# Patient Record
Sex: FEMALE | Race: WHITE | NOT HISPANIC OR LATINO | Employment: OTHER | URBAN - METROPOLITAN AREA
[De-identification: names, ages, dates, MRNs, and addresses within clinical notes are randomized per-mention and may not be internally consistent; named-entity substitution may affect disease eponyms.]

---

## 2017-01-10 RX ORDER — LEVOTHYROXINE SODIUM 0.1 MG/1
TABLET ORAL
Qty: 90 TABLET | Refills: 0 | Status: SHIPPED | OUTPATIENT
Start: 2017-01-10 | End: 2017-01-20 | Stop reason: SDUPTHER

## 2017-01-20 RX ORDER — LEVOTHYROXINE SODIUM 0.1 MG/1
TABLET ORAL
Qty: 90 TABLET | Refills: 0 | Status: SHIPPED | OUTPATIENT
Start: 2017-01-20 | End: 2017-07-24 | Stop reason: SDUPTHER

## 2017-01-20 RX ORDER — VALSARTAN AND HYDROCHLOROTHIAZIDE 160; 12.5 MG/1; MG/1
TABLET, FILM COATED ORAL
Qty: 90 TABLET | Refills: 1 | Status: SHIPPED | OUTPATIENT
Start: 2017-01-20 | End: 2017-07-24 | Stop reason: SDUPTHER

## 2017-03-09 ENCOUNTER — RESULTS ENCOUNTER (OUTPATIENT)
Dept: INTERNAL MEDICINE | Facility: CLINIC | Age: 82
End: 2017-03-09

## 2017-03-09 ENCOUNTER — OFFICE VISIT (OUTPATIENT)
Dept: INTERNAL MEDICINE | Facility: CLINIC | Age: 82
End: 2017-03-09

## 2017-03-09 VITALS
TEMPERATURE: 97.9 F | OXYGEN SATURATION: 97 % | DIASTOLIC BLOOD PRESSURE: 68 MMHG | BODY MASS INDEX: 29.59 KG/M2 | HEIGHT: 64 IN | WEIGHT: 173.3 LBS | HEART RATE: 80 BPM | SYSTOLIC BLOOD PRESSURE: 142 MMHG

## 2017-03-09 DIAGNOSIS — I10 ESSENTIAL HYPERTENSION: Primary | ICD-10-CM

## 2017-03-09 DIAGNOSIS — E11.9 TYPE 2 DIABETES MELLITUS WITHOUT COMPLICATION, WITHOUT LONG-TERM CURRENT USE OF INSULIN (HCC): ICD-10-CM

## 2017-03-09 DIAGNOSIS — E03.9 ACQUIRED HYPOTHYROIDISM: ICD-10-CM

## 2017-03-09 DIAGNOSIS — E78.2 MIXED HYPERLIPIDEMIA: ICD-10-CM

## 2017-03-09 PROCEDURE — 99214 OFFICE O/P EST MOD 30 MIN: CPT | Performed by: FAMILY MEDICINE

## 2017-03-09 RX ORDER — ANASTROZOLE 1 MG/1
1 TABLET ORAL DAILY
COMMUNITY
End: 2017-07-06

## 2017-03-09 NOTE — PROGRESS NOTES
Subjective   Marcelle Johnson is a 84 y.o. female.     Chief Complaint   Patient presents with   • follow up to hypothyroidism   • follow up to hypertension   • follow up hyperlipidemia   • follow up to diabetes         History of Present Illness   Patient comes in new to this office for follow-up of chronic medical problems listed above she has no acute concerns her medications have been long-standing and stable she has no chest pain shortness of breath or increase fatigue her thyroid has blast and checked for TSH in August she has not had a recent A1c but does not want doing blood work because she says it hasn't been covered in the past through her insurance company she monitors her blood pressure occasionally but does not check her blood sugars.  He has been through dietary counseling she is followed by gastroenterology oncology  The following portions of the patient's history were reviewed and updated as appropriate: allergies, current medications, past family history, past medical history, past social history, past surgical history and problem list.    Review of Systems   Constitutional: Negative.    HENT: Negative.    Eyes: Negative.    Respiratory: Negative.    Cardiovascular: Negative.    Gastrointestinal: Negative.    Genitourinary: Negative.    Musculoskeletal: Negative.    Neurological: Negative.    Hematological: Negative.        Objective   Physical Exam   Constitutional: She is oriented to person, place, and time. She appears well-developed and well-nourished. No distress.   HENT:   Head: Normocephalic and atraumatic.   Eyes: Conjunctivae and EOM are normal. Pupils are equal, round, and reactive to light. Right eye exhibits no discharge. Left eye exhibits no discharge. No scleral icterus.   Neck: Normal range of motion. Neck supple. No tracheal deviation present. No thyromegaly present.   Cardiovascular: Normal rate, regular rhythm, normal heart sounds, intact distal pulses and normal pulses.  Exam  reveals no gallop.    No murmur heard.  Pulmonary/Chest: Effort normal and breath sounds normal. No respiratory distress. She has no wheezes. She has no rales.   Musculoskeletal: Normal range of motion.   Neurological: She is alert and oriented to person, place, and time. She exhibits normal muscle tone. Coordination normal.   Skin: Skin is warm. No rash noted. No erythema. No pallor.   Psychiatric: She has a normal mood and affect. Her behavior is normal. Judgment and thought content normal.   Nursing note and vitals reviewed.      Assessment/Plan   Marcelle was seen today for follow up to hypothyroidism, follow up to hypertension, follow up hyperlipidemia and follow up to diabetes.    Diagnoses and all orders for this visit:    Essential hypertension    Mixed hyperlipidemia  -     Lipid Panel; Future    Acquired hypothyroidism  -     TSH; Future    Type 2 diabetes mellitus without complication, without long-term current use of insulin  -     Microalbumin / Creatinine Urine Ratio; Future  -     Hemoglobin A1c; Future      Follow-up fasting blood work as ordered with microalbumin to determine need for medication and controlling her diabetes monitor blood pressure continue low-cholesterol diet with lipid therapy follow-up results of TSH  Otherwise wellness visit 6 months

## 2017-03-14 ENCOUNTER — RESULTS ENCOUNTER (OUTPATIENT)
Dept: INTERNAL MEDICINE | Facility: CLINIC | Age: 82
End: 2017-03-14

## 2017-03-14 DIAGNOSIS — E11.9 TYPE 2 DIABETES MELLITUS WITHOUT COMPLICATION, WITHOUT LONG-TERM CURRENT USE OF INSULIN (HCC): ICD-10-CM

## 2017-03-14 DIAGNOSIS — E78.2 MIXED HYPERLIPIDEMIA: ICD-10-CM

## 2017-03-14 DIAGNOSIS — E03.9 ACQUIRED HYPOTHYROIDISM: ICD-10-CM

## 2017-06-01 ENCOUNTER — OFFICE VISIT (OUTPATIENT)
Dept: ONCOLOGY | Facility: CLINIC | Age: 82
End: 2017-06-01

## 2017-06-01 ENCOUNTER — APPOINTMENT (OUTPATIENT)
Dept: ONCOLOGY | Facility: HOSPITAL | Age: 82
End: 2017-06-01

## 2017-06-01 ENCOUNTER — LAB (OUTPATIENT)
Dept: OTHER | Facility: HOSPITAL | Age: 82
End: 2017-06-01

## 2017-06-01 VITALS
BODY MASS INDEX: 30.48 KG/M2 | RESPIRATION RATE: 12 BRPM | OXYGEN SATURATION: 97 % | TEMPERATURE: 97.6 F | DIASTOLIC BLOOD PRESSURE: 76 MMHG | SYSTOLIC BLOOD PRESSURE: 164 MMHG | HEART RATE: 90 BPM | WEIGHT: 172 LBS | HEIGHT: 63 IN

## 2017-06-01 DIAGNOSIS — C50.411 BREAST CANCER OF UPPER-OUTER QUADRANT OF RIGHT FEMALE BREAST (HCC): Primary | ICD-10-CM

## 2017-06-01 DIAGNOSIS — R94.02 ABNORMAL BRAIN SCAN: Primary | ICD-10-CM

## 2017-06-01 DIAGNOSIS — M81.0 OSTEOPOROSIS: ICD-10-CM

## 2017-06-01 DIAGNOSIS — C50.919 MALIGNANT NEOPLASM OF FEMALE BREAST, UNSPECIFIED LATERALITY, UNSPECIFIED SITE OF BREAST: ICD-10-CM

## 2017-06-01 LAB
ALBUMIN SERPL-MCNC: 3.7 G/DL (ref 3.5–5.2)
ALBUMIN/GLOB SERPL: 1.3 G/DL
ALP SERPL-CCNC: 55 U/L (ref 39–117)
ALT SERPL W P-5'-P-CCNC: 17 U/L (ref 1–33)
ANION GAP SERPL CALCULATED.3IONS-SCNC: 14 MMOL/L
AST SERPL-CCNC: 20 U/L (ref 1–32)
BASOPHILS # BLD AUTO: 0.06 10*3/MM3 (ref 0–0.2)
BASOPHILS NFR BLD AUTO: 0.6 % (ref 0–1.5)
BILIRUB SERPL-MCNC: 0.4 MG/DL (ref 0.1–1.2)
BUN BLD-MCNC: 12 MG/DL (ref 8–23)
BUN/CREAT SERPL: 15.2 (ref 7–25)
CALCIUM SPEC-SCNC: 9 MG/DL (ref 8.6–10.5)
CHLORIDE SERPL-SCNC: 97 MMOL/L (ref 98–107)
CO2 SERPL-SCNC: 28 MMOL/L (ref 22–29)
CREAT BLD-MCNC: 0.79 MG/DL (ref 0.57–1)
DEPRECATED RDW RBC AUTO: 40.7 FL (ref 37–54)
EOSINOPHIL # BLD AUTO: 0.2 10*3/MM3 (ref 0–0.7)
EOSINOPHIL NFR BLD AUTO: 2 % (ref 0.3–6.2)
ERYTHROCYTE [DISTWIDTH] IN BLOOD BY AUTOMATED COUNT: 13.1 % (ref 11.7–13)
GFR SERPL CREATININE-BSD FRML MDRD: 69 ML/MIN/1.73
GLOBULIN UR ELPH-MCNC: 2.8 GM/DL
GLUCOSE BLD-MCNC: 141 MG/DL (ref 65–99)
HCT VFR BLD AUTO: 39.8 % (ref 35.6–45.5)
HGB BLD-MCNC: 13.4 G/DL (ref 11.9–15.5)
HOLD SPECIMEN: NORMAL
IMM GRANULOCYTES # BLD: 0.03 10*3/MM3 (ref 0–0.03)
IMM GRANULOCYTES NFR BLD: 0.3 % (ref 0–0.5)
LYMPHOCYTES # BLD AUTO: 3.99 10*3/MM3 (ref 0.9–4.8)
LYMPHOCYTES NFR BLD AUTO: 39.4 % (ref 19.6–45.3)
MAGNESIUM SERPL-MCNC: 2 MG/DL (ref 1.6–2.4)
MCH RBC QN AUTO: 28.9 PG (ref 26.9–32)
MCHC RBC AUTO-ENTMCNC: 33.7 G/DL (ref 32.4–36.3)
MCV RBC AUTO: 86 FL (ref 80.5–98.2)
MONOCYTES # BLD AUTO: 0.72 10*3/MM3 (ref 0.2–1.2)
MONOCYTES NFR BLD AUTO: 7.1 % (ref 5–12)
NEUTROPHILS # BLD AUTO: 5.13 10*3/MM3 (ref 1.9–8.1)
NEUTROPHILS NFR BLD AUTO: 50.6 % (ref 42.7–76)
NRBC BLD MANUAL-RTO: 0 /100 WBC (ref 0–0)
PHOSPHATE SERPL-MCNC: 3.7 MG/DL (ref 2.5–4.5)
PLATELET # BLD AUTO: 185 10*3/MM3 (ref 140–500)
PMV BLD AUTO: 10 FL (ref 6–12)
POTASSIUM BLD-SCNC: 4.1 MMOL/L (ref 3.5–5.2)
PROT SERPL-MCNC: 6.5 G/DL (ref 6–8.5)
RBC # BLD AUTO: 4.63 10*6/MM3 (ref 3.9–5.2)
SODIUM BLD-SCNC: 139 MMOL/L (ref 136–145)
WBC NRBC COR # BLD: 10.13 10*3/MM3 (ref 4.5–10.7)

## 2017-06-01 PROCEDURE — 85025 COMPLETE CBC W/AUTO DIFF WBC: CPT | Performed by: INTERNAL MEDICINE

## 2017-06-01 PROCEDURE — 83735 ASSAY OF MAGNESIUM: CPT | Performed by: INTERNAL MEDICINE

## 2017-06-01 PROCEDURE — 99214 OFFICE O/P EST MOD 30 MIN: CPT | Performed by: INTERNAL MEDICINE

## 2017-06-01 PROCEDURE — 36415 COLL VENOUS BLD VENIPUNCTURE: CPT

## 2017-06-01 PROCEDURE — 84100 ASSAY OF PHOSPHORUS: CPT | Performed by: INTERNAL MEDICINE

## 2017-06-01 PROCEDURE — 80053 COMPREHEN METABOLIC PANEL: CPT | Performed by: INTERNAL MEDICINE

## 2017-06-01 NOTE — PROGRESS NOTES
Subjective .     REASONS FOR FOLLOWUP: 1.  [id number] invasive ductal carcinoma of the right breast, estrogen receptor positive greater than 90%, progesterone receptor 90%, HER-2/breanne 2+ on immunohistochemistry and negative by fish  2.  Osteoporosis  3. arimidex held secondary to arthralgias.  ? Side effect to prolia,  HISTORY OF PRESENT ILLNESS:  The patient is a 84 y.o. year old female who is here for follow-up with the above-mentioned history.    History of Present Illness   patient is a 83-year-old female with a above  history currently here for follow-up.  She is here to start Arimidex for her T1 cN0 M0 invasive ductal carcinoma of the right breast.  She had a bone density testing done which does show osteoporosis. Pt received one dose of prolia and states she felt like her swallowing got worse and had gum pain and now has on and of gum bleeding. She is not wanting to take prolia. She has baseline difficulty swallowing and has required dilatation of esophagus. Does not want oral bisphosphonates. i m unsure if gum pain is secondary to arimidex.las mammogram 8/2016    Past Medical History:   Diagnosis Date   • Breast cancer of upper-outer quadrant of right female breast 10/13/2016   • Colon cancer     had colon resection   • Diabetes     borderline   • Diverticulosis    • Esophageal stricture     had it stretched via scope   • High cholesterol    • Hypertension    • Hypothyroid    • Pneumonia        ONCOLOGIC HISTORY:  (History from previous dates can be found in the separate document.)  patient is a 83-year-old female who states that she noticed a lump in the upper outer quadrant of the right breast over the last 2 months. She thinks it was the size of a dime. She then underwent a mammogram on 8/3/2016 which showed a 1 cm mass in the middle third upper outer quadrant of the right breast. Additionly  there was a small asymmetry in the left breast. Spot compression showed partial resolution of this area in the left  breast. This measures 0.8 cm. No evidence of axillary adenopathy seen.     Ultrasound was done which shows at 10 o'clock position in the right breast there is a 0.9 cm irregular heterogeneous mass. But no abnormality is seen in the left breast. A six-month follow-up on the left breast is suggested.     Patient then underwent ultrasound-guided biopsy on 8/17/2016. Allergies shows invasive mammary carcinoma ductal type with focal lobular features in the right breast, Nadira score of 5 out of 9. There is no angiolymphatic invasion seen. Maximum dimension of invasive carcinoma in 7 mm. No carcinoma in situ identified. Estrogen receptors were greater than 90%, progesterone receptor is 90%, HER-2/breanne was 2+ on immunohistochemistry and negative by fish.     MRI of the breast shows middle third upper quadrant of the right breast at 10 o'clock position the biopsy cavity measured 1.1 cm from superior to inferior dimension 1.3 cm in anterior to posterior dimension and 2.1 cm from medial to lateral dimension. There were no other areas of enhancement in the left breast.  Chest x-ray no acute infiltrate.     Patient underwent lumpectomy on the right side with sentinel lymph node biopsy by Dr. Lee on 9/19/2016. Patient had invasive carcinoma 1.2 cm with lobular features. The overall grade is grade 2 with her Nadira score of 6 out of 9. He had focal ductal carcinoma in situ and focal atypical lobular hyperplasia as well. The ductal carcinoma in situ was less than 1 mm, cribriform pattern and low nuclear grade grade 1. The margins were indeterminate for invasive carcinoma but uninvolved by DCIS. Patient has had reexcised margins and invasive carcinoma is present only in the reexcised anterior margin. The tumor focally extends to within 1 mm of the reexcised anterior margin. The fibroinflammatory biopsy skin changes extend to the anterior margin. I will need to discuss with the pathologist about the margins. She has  extends to within 3 mm was to margins and to within 5 mm in the inferior margin. The number of sentinel lymph nodes examined is to and there is no evidence of my or macro metastasis and no isolated tumor cells.     Patient now has been referred to radiation oncology and us for further options of treatment.  Given her age, her low stage, her strong estrogen and progesterone receptor positivity Dr. Nolasco felt that she does not need to undergo radiation.    Patient's bone density done shows osteoporosis.  Discussion done about consideration of tamoxifen versus Arimidex with prolia.  Patient refuses tamoxifen and is willing to consider Arimidex.  We did discuss that it'll be important for her to take prolia , calcium and vitamin D.  Pt could not tolerate prolia .    Pt on arimidex  and likely change to tamoxifen. Due to side effects.  Past medical history: Patient was diagnosed with the colon cancer back in  and at the same time she had her ovaries removed and appendix and gallbladder removed. She didn't require any chemotherapy at the time.  Patient had pneumonia back in .  Her hypertension  High cholesterol  Hypothyroidism  Patient has had colonoscopy 5 years ago by Dr. Tanner and apparently had a polyp she was to return to him for another colonoscopy in 5 years.  Patient had esophageal stricture as a result she underwent dilation by Dr. Hopson last year.  She underwent parathyroidectomy.  She has borderline diabetes. On diet control     OB/GYN history she started metastases. At age 12, had menopause at age 50 when she underwent oophorectomy. She is  2 para 2 no miscarriages she has 2 children one is 54 and second one is 51-year-old and one grandchild.       Current Outpatient Prescriptions on File Prior to Visit   Medication Sig Dispense Refill   • anastrozole (ARIMIDEX) 1 MG tablet Take 1 mg by mouth Daily.     • aspirin 81 MG tablet Take 81 mg by mouth daily.     • Calcium Citrate-Vitamin D  "(CALCIUM + D PO) Take 1,000 mg by mouth daily.     • denosumab (PROLIA) 60 MG/ML solution syringe Inject 60 mg under the skin 1 (One) Time.     • Esomeprazole Magnesium (NEXIUM PO) Take 22.3 mg by mouth every morning.     • FIBER PO Take 2 capsules by mouth daily.     • levothyroxine (SYNTHROID, LEVOTHROID) 100 MCG tablet TAKE 1 TABLET BY MOUTH 30 MINUTES BEFORE A MEAL ONCE DAILY. 90 tablet 0   • pravastatin (PRAVACHOL) 40 MG tablet Take 40 mg by mouth every night.     • sulfamethoxazole-trimethoprim (BACTRIM DS,SEPTRA DS) 800-160 MG per tablet Take 1 tablet by mouth 2 (Two) Times a Day. 10 tablet 0   • valsartan-hydrochlorothiazide (DIOVAN-HCT) 160-12.5 MG per tablet TAKE 1 TABLET EVERY MORNING 90 tablet 1   • vitamin C (ASCORBIC ACID) 500 MG tablet Take 500 mg by mouth Daily As Needed.       No current facility-administered medications on file prior to visit.        ALLERGIES:     Allergies   Allergen Reactions   • Morphine And Related Nausea And Vomiting       Social History     Social History   • Marital status:      Spouse name: N/A   • Number of children: 2   • Years of education: High school     Occupational History   •  Retired     Social History Main Topics   • Smoking status: Former Smoker     Years: 5.00     Types: Cigarettes     Quit date: 1966   • Smokeless tobacco: Never Used   • Alcohol use No   • Drug use: No   • Sexual activity: Defer     Other Topics Concern   • Not on file     Social History Narrative         Cancer-related family history is negative for Breast cancer, Colon cancer, Endometrial cancer, and Ovarian cancer.     Review of Systems  A comprehensive 14 point review of systems was performed and was negative except as mentioned.    Objective      Vitals:    06/01/17 0923   BP: 164/76   Pulse: 90   Resp: 12   Temp: 97.6 °F (36.4 °C)   TempSrc: Oral   SpO2: 97%   Weight: 172 lb (78 kg)   Height: 63.39\" (161 cm)  Comment: NEW HT, NO SHOES   PainSc: 0-No pain     Current Status " 6/1/2017   ECOG score 0       Physical Exam    GENERAL:  Well-developed, well-nourished in no acute distress.   SKIN:  Warm, dry without rashes, purpura or petechiae.  HEAD:  Normocephalic.  EYES:  Pupils equal, round and reactive to light.  EOMs intact.  Conjunctivae normal.  EARS:  Hearing intact.  NOSE:  Septum midline.  No excoriations or nasal discharge.  MOUTH:  Tongue is well-papillated; no stomatitis or ulcers.  Lips normal.  THROAT:  Oropharynx without lesions or exudates.  NECK:  Supple with good range of motion; no thyromegaly or masses, no JVD.  LYMPHATICS:  No cervical, supraclavicular, axillary or inguinal adenopathy.  CHEST:  Lungs clear to percussion and auscultation. Good airflow.  BREASTS: NO MASSES IN BILATERAL BREAST.no nipple discharge.no axillary or supraclavicular adenopathy.  CARDIAC:  Regular rate and rhythm without murmurs, rubs or gallops. Normal S1,S2.  ABDOMEN:  Soft, nontender with no organomegaly or masses.  EXTREMITIES:  No clubbing, cyanosis or edema.  NEUROLOGICAL:  Cranial Nerves II-XII grossly intact.  No focal neurological deficits.  PSYCHIATRIC:  Normal affect and mood.      RECENT LABS:  Hematology WBC   Date Value Ref Range Status   06/01/2017 10.13 4.50 - 10.70 10*3/mm3 Final     RBC   Date Value Ref Range Status   06/01/2017 4.63 3.90 - 5.20 10*6/mm3 Final   02/25/2016 4.63  Final     Hemoglobin   Date Value Ref Range Status   06/01/2017 13.4 11.9 - 15.5 g/dL Final     Hematocrit   Date Value Ref Range Status   06/01/2017 39.8 35.6 - 45.5 % Final     Platelets   Date Value Ref Range Status   06/01/2017 185 140 - 500 10*3/mm3 Final        Assessment/Plan        T1 cN0 M0 invasive ductal carcinoma of the right breast, with lobular features, ER positive at 90%, MI positive at 90%, HER-2/breanne 2+ by minimal histochemistry and negative by fish with a HER-2/CEP ratio of 1.1. She initially presented with a mass in the right breast. And mammogram had shown it to be 1 cm irregular mass  at 10 o'clock position. There was no evidence of any right axillary adenopathy. There was also questionable 0.8 cm asymmetry in the left breast, but ultrasound did not  anything on the left breast it was negative and there is partial resolution by spot compression on the left breast. As a result a six-month follow-up was suggested on the left breast. On the right breast though there was persistence of the mass effect in o'clock position. She underwent biopsy which showed inpatient invasive ductal carcinoma. Subsequently patient was seen by Dr. Lee and MRI was opted. MRI showed a biopsy cavity of 1.1 cm X 2.1 cmX 1.7 cm. But there was no evidence of any axillary adenopathy on MRI. In the left breast there was no evidence of any enhancement.      Patient underwent lumpectomy with sentinel lymph node biopsy which showed a 1.2 cm mass with DCIS present patient had to have margins reexcised which contained invasive ductal carcinoma, ductal carcinoma in situ was less than a millimeter cribriform pattern grade 1. But the invasive component was grade 2 with a [place] [facility] score of 6 out of 9. The tumor focally extended to within 1 mm of the reexcised anterior margin. The DCIS extended to within 3 mm of the posterior margin and to within 5 limit of the inferior margin. There were 2 sentinel lymph nodes which were all negative. So it is a T1 CN 0 M0 invasive ductal carcinoma of the right breast with presence of some DCIS.     I had a lengthy discussion with patient about consideration of hormonal treatment with aromatase inhibitor given that she is ER/NM positive and HER-2/breanne negative. Given her age and underlying medical problems patient certainly does not want to consider chemotherapy. She does not mind going on hormonal therapy. We had a lengthy discussion about the side effects of aromatase inhibitor Arimidex and she understands the side effects in length.     Given her osteoporosis I did discuss with her about  consideration of tamoxifen but after discussing the side effects of tamoxifen she refuses to take it.  She says she would rather go on Arimidex.  In which case we will need to start her on prolia    Osteoporosis: We will get insurance to approve prolia.  I went over the side effects of this in length.  She does have a dental appointment next week.  We discussed the possibility of a rare chance of osteonecrosis  as well.  She is to start calcium 1200 mg once daily, vitamin D 1000 international units daily as well.    Plan #1 patient started Arimidex and initially had itching but likely was due to dry skin and subsequently tolerated Arimidex.but now with arthralgias and arimidex held.    2.  Pt unable to take prolia ? sideeffect of difficulty swallowing.    3. Fu 4 weeks to see if symptoms of gum pain resolves.    4. Plan to START  TAMOXIFEN AT NEXT APPOINT.    Maria M Galicia MD

## 2017-07-05 DIAGNOSIS — C50.411 BREAST CANCER OF UPPER-OUTER QUADRANT OF RIGHT FEMALE BREAST (HCC): Primary | ICD-10-CM

## 2017-07-06 ENCOUNTER — OFFICE VISIT (OUTPATIENT)
Dept: ONCOLOGY | Facility: CLINIC | Age: 82
End: 2017-07-06

## 2017-07-06 ENCOUNTER — LAB (OUTPATIENT)
Dept: OTHER | Facility: HOSPITAL | Age: 82
End: 2017-07-06

## 2017-07-06 VITALS
DIASTOLIC BLOOD PRESSURE: 75 MMHG | HEART RATE: 78 BPM | BODY MASS INDEX: 30.65 KG/M2 | RESPIRATION RATE: 18 BRPM | OXYGEN SATURATION: 96 % | HEIGHT: 63 IN | TEMPERATURE: 97.5 F | WEIGHT: 173 LBS | SYSTOLIC BLOOD PRESSURE: 145 MMHG

## 2017-07-06 DIAGNOSIS — C50.919 MALIGNANT NEOPLASM OF FEMALE BREAST, UNSPECIFIED LATERALITY, UNSPECIFIED SITE OF BREAST: Primary | ICD-10-CM

## 2017-07-06 DIAGNOSIS — C50.411 BREAST CANCER OF UPPER-OUTER QUADRANT OF RIGHT FEMALE BREAST (HCC): ICD-10-CM

## 2017-07-06 DIAGNOSIS — M81.0 OSTEOPOROSIS: ICD-10-CM

## 2017-07-06 LAB
ALBUMIN SERPL-MCNC: 3.7 G/DL (ref 3.5–5.2)
ALBUMIN/GLOB SERPL: 1.3 G/DL
ALP SERPL-CCNC: 61 U/L (ref 39–117)
ALT SERPL W P-5'-P-CCNC: 20 U/L (ref 1–33)
ANION GAP SERPL CALCULATED.3IONS-SCNC: 10.3 MMOL/L
AST SERPL-CCNC: 21 U/L (ref 1–32)
BASOPHILS # BLD AUTO: 0.05 10*3/MM3 (ref 0–0.2)
BASOPHILS NFR BLD AUTO: 0.6 % (ref 0–1.5)
BILIRUB SERPL-MCNC: 0.3 MG/DL (ref 0.1–1.2)
BUN BLD-MCNC: 10 MG/DL (ref 8–23)
BUN/CREAT SERPL: 13.2 (ref 7–25)
CALCIUM SPEC-SCNC: 9 MG/DL (ref 8.6–10.5)
CHLORIDE SERPL-SCNC: 98 MMOL/L (ref 98–107)
CO2 SERPL-SCNC: 26.7 MMOL/L (ref 22–29)
CREAT BLD-MCNC: 0.76 MG/DL (ref 0.57–1)
DEPRECATED RDW RBC AUTO: 39.8 FL (ref 37–54)
EOSINOPHIL # BLD AUTO: 0.22 10*3/MM3 (ref 0–0.7)
EOSINOPHIL NFR BLD AUTO: 2.4 % (ref 0.3–6.2)
ERYTHROCYTE [DISTWIDTH] IN BLOOD BY AUTOMATED COUNT: 13 % (ref 11.7–13)
GFR SERPL CREATININE-BSD FRML MDRD: 73 ML/MIN/1.73
GLOBULIN UR ELPH-MCNC: 2.8 GM/DL
GLUCOSE BLD-MCNC: 160 MG/DL (ref 65–99)
HCT VFR BLD AUTO: 38.4 % (ref 35.6–45.5)
HGB BLD-MCNC: 13 G/DL (ref 11.9–15.5)
IMM GRANULOCYTES # BLD: 0.02 10*3/MM3 (ref 0–0.03)
IMM GRANULOCYTES NFR BLD: 0.2 % (ref 0–0.5)
LYMPHOCYTES # BLD AUTO: 3.38 10*3/MM3 (ref 0.9–4.8)
LYMPHOCYTES NFR BLD AUTO: 37.5 % (ref 19.6–45.3)
MCH RBC QN AUTO: 28.6 PG (ref 26.9–32)
MCHC RBC AUTO-ENTMCNC: 33.9 G/DL (ref 32.4–36.3)
MCV RBC AUTO: 84.4 FL (ref 80.5–98.2)
MONOCYTES # BLD AUTO: 0.54 10*3/MM3 (ref 0.2–1.2)
MONOCYTES NFR BLD AUTO: 6 % (ref 5–12)
NEUTROPHILS # BLD AUTO: 4.81 10*3/MM3 (ref 1.9–8.1)
NEUTROPHILS NFR BLD AUTO: 53.3 % (ref 42.7–76)
NRBC BLD MANUAL-RTO: 0 /100 WBC (ref 0–0)
PLATELET # BLD AUTO: 185 10*3/MM3 (ref 140–500)
PMV BLD AUTO: 10.4 FL (ref 6–12)
POTASSIUM BLD-SCNC: 3.8 MMOL/L (ref 3.5–5.2)
PROT SERPL-MCNC: 6.5 G/DL (ref 6–8.5)
RBC # BLD AUTO: 4.55 10*6/MM3 (ref 3.9–5.2)
SODIUM BLD-SCNC: 135 MMOL/L (ref 136–145)
WBC NRBC COR # BLD: 9.02 10*3/MM3 (ref 4.5–10.7)

## 2017-07-06 PROCEDURE — 99214 OFFICE O/P EST MOD 30 MIN: CPT | Performed by: INTERNAL MEDICINE

## 2017-07-06 PROCEDURE — 36415 COLL VENOUS BLD VENIPUNCTURE: CPT

## 2017-07-06 PROCEDURE — 85025 COMPLETE CBC W/AUTO DIFF WBC: CPT | Performed by: INTERNAL MEDICINE

## 2017-07-06 PROCEDURE — 80053 COMPREHEN METABOLIC PANEL: CPT | Performed by: INTERNAL MEDICINE

## 2017-07-06 RX ORDER — TAMOXIFEN CITRATE 20 MG/1
20 TABLET ORAL DAILY
Qty: 90 TABLET | Refills: 2 | Status: SHIPPED | OUTPATIENT
Start: 2017-07-06 | End: 2018-03-24 | Stop reason: SDUPTHER

## 2017-07-06 NOTE — PROGRESS NOTES
Subjective .     REASONS FOR FOLLOWUP: 1.  [id number] invasive ductal carcinoma of the right breast, estrogen receptor positive greater than 90%, progesterone receptor 90%, HER-2/breanne 2+ on immunohistochemistry and negative by fish  2.  Osteoporosis  3. arimidex held secondary to arthralgias.  4. ? Side effect to prolia, patient complained of jaw pain secondary to Prolia and refused any bisphosphonates  5.  Arimidex discontinued with resolution of arthralgias.  6.  Patient started on tamoxifen as of July 6, 2017.    HISTORY OF PRESENT ILLNESS:  The patient is a 84 y.o. year old female who is here for follow-up with the above-mentioned history.    History of Present Illness   patient is a 83-year-old female with a above  history currently here for follow-up.  She is here to start Arimidex for her T1 cN0 M0 invasive ductal carcinoma of the right breast.  She had a bone density testing done which does show osteoporosis. Pt received one dose of prolia and states she felt like her swallowing got worse and had gum pain and now has on and of gum bleeding. She is not wanting to take prolia. She has baseline difficulty swallowing and has required dilatation of esophagus. Does not want oral bisphosphonates. i m unsure if gum pain is secondary to arimidex.las mammogram 8/2016 negative.    Patient feels well today.  The arthralgias are improved after stopping Arimidex.  We will start her on tamoxifen.    Past Medical History:   Diagnosis Date   • Breast cancer of upper-outer quadrant of right female breast 10/13/2016   • Colon cancer     had colon resection   • Diabetes     borderline   • Diverticulosis    • Esophageal stricture     had it stretched via scope   • High cholesterol    • Hypertension    • Hypothyroid    • Pneumonia        ONCOLOGIC HISTORY:  (History from previous dates can be found in the separate document.)  patient is a 83-year-old female who states that she noticed a lump in the upper outer quadrant of the right  breast over the last 2 months. She thinks it was the size of a dime. She then underwent a mammogram on 8/3/2016 which showed a 1 cm mass in the middle third upper outer quadrant of the right breast. Additionly  there was a small asymmetry in the left breast. Spot compression showed partial resolution of this area in the left breast. This measures 0.8 cm. No evidence of axillary adenopathy seen.     Ultrasound was done which shows at 10 o'clock position in the right breast there is a 0.9 cm irregular heterogeneous mass. But no abnormality is seen in the left breast. A six-month follow-up on the left breast is suggested.     Patient then underwent ultrasound-guided biopsy on 8/17/2016. Allergies shows invasive mammary carcinoma ductal type with focal lobular features in the right breast, Nadira score of 5 out of 9. There is no angiolymphatic invasion seen. Maximum dimension of invasive carcinoma in 7 mm. No carcinoma in situ identified. Estrogen receptors were greater than 90%, progesterone receptor is 90%, HER-2/breanne was 2+ on immunohistochemistry and negative by fish.     MRI of the breast shows middle third upper quadrant of the right breast at 10 o'clock position the biopsy cavity measured 1.1 cm from superior to inferior dimension 1.3 cm in anterior to posterior dimension and 2.1 cm from medial to lateral dimension. There were no other areas of enhancement in the left breast.  Chest x-ray no acute infiltrate.     Patient underwent lumpectomy on the right side with sentinel lymph node biopsy by Dr. Lee on 9/19/2016. Patient had invasive carcinoma 1.2 cm with lobular features. The overall grade is grade 2 with her Nadira score of 6 out of 9. He had focal ductal carcinoma in situ and focal atypical lobular hyperplasia as well. The ductal carcinoma in situ was less than 1 mm, cribriform pattern and low nuclear grade grade 1. The margins were indeterminate for invasive carcinoma but uninvolved by DCIS.  Patient has had reexcised margins and invasive carcinoma is present only in the reexcised anterior margin. The tumor focally extends to within 1 mm of the reexcised anterior margin. The fibroinflammatory biopsy skin changes extend to the anterior margin. I will need to discuss with the pathologist about the margins. She has extends to within 3 mm was to margins and to within 5 mm in the inferior margin. The number of sentinel lymph nodes examined is to and there is no evidence of my or macro metastasis and no isolated tumor cells.     Patient now has been referred to radiation oncology and us for further options of treatment.  Given her age, her low stage, her strong estrogen and progesterone receptor positivity Dr. Nolasco felt that she does not need to undergo radiation.    Patient's bone density done shows osteoporosis.  Discussion done about consideration of tamoxifen versus Arimidex with prolia.  Patient refuses tamoxifen and is willing to consider Arimidex.  We did discuss that it'll be important for her to take prolia , calcium and vitamin D.  Pt could not tolerate prolia .    Pt on arimidex  and likely change to tamoxifen. Due to side effects    Arimidex discontinued secondary to severe arthralgias.  Tamoxifen started as of July 6, 2017.    .  Past medical history: Patient was diagnosed with the colon cancer back in 1989 and at the same time she had her ovaries removed and appendix and gallbladder removed. She didn't require any chemotherapy at the time.  Patient had pneumonia back in 1995.  Her hypertension  High cholesterol  Hypothyroidism  Patient has had colonoscopy 5 years ago by Dr. Tanner and apparently had a polyp she was to return to him for another colonoscopy in 5 years.  Patient had esophageal stricture as a result she underwent dilation by Dr. Hopson last year.  She underwent parathyroidectomy.  She has borderline diabetes. On diet control     OB/GYN history she started metastases. At age 12,  had menopause at age 50 when she underwent oophorectomy. She is  2 para 2 no miscarriages she has 2 children one is 54 and second one is 51-year-old and one grandchild.       Current Outpatient Prescriptions on File Prior to Visit   Medication Sig Dispense Refill   • aspirin 81 MG tablet Take 81 mg by mouth daily.     • Calcium Citrate-Vitamin D (CALCIUM + D PO) Take 1,000 mg by mouth daily.     • Esomeprazole Magnesium (NEXIUM PO) Take 22.3 mg by mouth every morning.     • FIBER PO Take 2 capsules by mouth daily.     • levothyroxine (SYNTHROID, LEVOTHROID) 100 MCG tablet TAKE 1 TABLET BY MOUTH 30 MINUTES BEFORE A MEAL ONCE DAILY. 90 tablet 0   • pravastatin (PRAVACHOL) 40 MG tablet Take 40 mg by mouth every night.     • valsartan-hydrochlorothiazide (DIOVAN-HCT) 160-12.5 MG per tablet TAKE 1 TABLET EVERY MORNING 90 tablet 1   • vitamin C (ASCORBIC ACID) 500 MG tablet Take 500 mg by mouth Daily As Needed.     • [DISCONTINUED] anastrozole (ARIMIDEX) 1 MG tablet Take 1 mg by mouth Daily.     • [DISCONTINUED] denosumab (PROLIA) 60 MG/ML solution syringe Inject 60 mg under the skin 1 (One) Time.     • [DISCONTINUED] sulfamethoxazole-trimethoprim (BACTRIM DS,SEPTRA DS) 800-160 MG per tablet Take 1 tablet by mouth 2 (Two) Times a Day. 10 tablet 0     No current facility-administered medications on file prior to visit.        ALLERGIES:     Allergies   Allergen Reactions   • Morphine And Related Nausea And Vomiting       Social History     Social History   • Marital status:      Spouse name: N/A   • Number of children: 2   • Years of education: High school     Occupational History   •  Retired     Social History Main Topics   • Smoking status: Former Smoker     Years: 5.00     Types: Cigarettes     Quit date:    • Smokeless tobacco: Never Used   • Alcohol use No   • Drug use: No   • Sexual activity: Defer     Other Topics Concern   • Not on file     Social History Narrative         Cancer-related family  "history is negative for Breast cancer, Colon cancer, Endometrial cancer, and Ovarian cancer.     Review of Systems  A comprehensive 14 point review of systems was performed and was negative except as mentioned.    Objective      Vitals:    07/06/17 1017   BP: 145/75   Pulse: 78   Resp: 18   Temp: 97.5 °F (36.4 °C)   TempSrc: Oral   SpO2: 96%   Weight: 173 lb (78.5 kg)   Height: 63.39\" (161 cm)  Comment: new ht   PainSc: 0-No pain     Current Status 7/6/2017   ECOG score 0       Physical Exam    GENERAL:  Well-developed, well-nourished in no acute distress.   NECK:  Supple with good range of motion; no thyromegaly or masses, no JVD.  LYMPHATICS:  No cervical, supraclavicular, axillary or inguinal adenopathy.  CHEST:  Lungs clear to percussion and auscultation. Good airflow.  BREASTS: NO MASSES IN BILATERAL BREAST.no nipple discharge.no axillary or supraclavicular adenopathy.  CARDIAC:  Regular rate and rhythm without murmurs, rubs or gallops. Normal S1,S2.  ABDOMEN:  Soft, nontender with no organomegaly or masses.  EXTREMITIES:  No clubbing, cyanosis or edema.  NEUROLOGICAL:  Cranial Nerves II-XII grossly intact.  No focal neurological deficits.  PSYCHIATRIC:  Normal affect and mood.      RECENT LABS:  Hematology WBC   Date Value Ref Range Status   07/06/2017 9.02 4.50 - 10.70 10*3/mm3 Final     RBC   Date Value Ref Range Status   07/06/2017 4.55 3.90 - 5.20 10*6/mm3 Final   02/25/2016 4.63  Final     Hemoglobin   Date Value Ref Range Status   07/06/2017 13.0 11.9 - 15.5 g/dL Final     Hematocrit   Date Value Ref Range Status   07/06/2017 38.4 35.6 - 45.5 % Final     Platelets   Date Value Ref Range Status   07/06/2017 185 140 - 500 10*3/mm3 Final        Assessment/Plan        1. T1 cN0 M0 invasive ductal carcinoma of the right breast, with lobular features, ER positive at 90%, AK positive at 90%, HER-2/breanne 2+ by minimal histochemistry and negative by fish with a HER-2/CEP ratio of 1.1.  Patient was started on " Arimidex 1 mg daily but due to severe arthralgias was discontinued.  After discontinuation arthralgias improved.  She now is willing to consider starting tamoxifen.  I have discussed with her in length the side effects of tamoxifen and she understands the side effects of rare chance for uterine cancer, hot flashes, thrombosis, stroke, pulmonary embolism, cataracts, rare cases of thrombocytopenia,.  We have also discussed with her that she needs a yearly check with OB/GYN.  But patient not willing to do yearly checks and is willing to consider going to them if she has any abnormal bleeding.    2. Osteoporosis:  She is on  calcium 1200 mg once daily, vitamin D 1000 international units daily as well.  Patient refuses Prolia because of side effect of jaw pain after receiving Prolia.  I have told her to discuss with her primary care about further treatment options for osteoporosis.    Plan #1 Arimidex discontinued    2.  Start tamoxifen 20 mg daily.  She is going to call me with side effects on the phone if she has any otherwise we'll see her back in 3 months with labs.    Maria M Galicia MD     Cc: Dr. James Lee

## 2017-07-24 RX ORDER — LEVOTHYROXINE SODIUM 0.1 MG/1
100 TABLET ORAL DAILY
Qty: 90 TABLET | Refills: 0 | Status: SHIPPED | OUTPATIENT
Start: 2017-07-24 | End: 2017-10-03 | Stop reason: SDUPTHER

## 2017-07-24 RX ORDER — VALSARTAN AND HYDROCHLOROTHIAZIDE 160; 12.5 MG/1; MG/1
1 TABLET, FILM COATED ORAL DAILY
Qty: 90 TABLET | Refills: 0 | Status: SHIPPED | OUTPATIENT
Start: 2017-07-24 | End: 2017-10-03 | Stop reason: SDUPTHER

## 2017-07-24 RX ORDER — PRAVASTATIN SODIUM 40 MG
40 TABLET ORAL NIGHTLY
Qty: 90 TABLET | Refills: 0 | Status: SHIPPED | OUTPATIENT
Start: 2017-07-24 | End: 2017-10-03 | Stop reason: SDUPTHER

## 2017-08-09 DIAGNOSIS — C50.919 MALIGNANT NEOPLASM OF FEMALE BREAST, UNSPECIFIED LATERALITY, UNSPECIFIED SITE OF BREAST: ICD-10-CM

## 2017-08-23 ENCOUNTER — OFFICE VISIT (OUTPATIENT)
Dept: INTERNAL MEDICINE | Facility: CLINIC | Age: 82
End: 2017-08-23

## 2017-08-23 VITALS
HEIGHT: 63 IN | HEART RATE: 77 BPM | OXYGEN SATURATION: 97 % | WEIGHT: 170.7 LBS | RESPIRATION RATE: 18 BRPM | SYSTOLIC BLOOD PRESSURE: 142 MMHG | DIASTOLIC BLOOD PRESSURE: 70 MMHG | BODY MASS INDEX: 30.25 KG/M2 | TEMPERATURE: 97.6 F

## 2017-08-23 DIAGNOSIS — M81.0 OSTEOPOROSIS: ICD-10-CM

## 2017-08-23 DIAGNOSIS — E78.2 MIXED HYPERLIPIDEMIA: Primary | ICD-10-CM

## 2017-08-23 DIAGNOSIS — R73.03 BORDERLINE DIABETES: ICD-10-CM

## 2017-08-23 DIAGNOSIS — E03.9 ACQUIRED HYPOTHYROIDISM: ICD-10-CM

## 2017-08-23 DIAGNOSIS — I10 ESSENTIAL HYPERTENSION: ICD-10-CM

## 2017-08-23 PROBLEM — Z00.00 MEDICARE ANNUAL WELLNESS VISIT, INITIAL: Status: ACTIVE | Noted: 2017-08-23

## 2017-08-23 LAB
ALBUMIN SERPL-MCNC: 3.8 G/DL (ref 3.5–5.2)
ALBUMIN/GLOB SERPL: 1.5 G/DL
ALP SERPL-CCNC: 56 U/L (ref 39–117)
ALT SERPL-CCNC: 18 U/L (ref 1–33)
AST SERPL-CCNC: 19 U/L (ref 1–32)
BILIRUB SERPL-MCNC: 0.3 MG/DL (ref 0.1–1.2)
BUN SERPL-MCNC: 12 MG/DL (ref 8–23)
BUN/CREAT SERPL: 13.5 (ref 7–25)
CALCIUM SERPL-MCNC: 9.2 MG/DL (ref 8.6–10.5)
CHLORIDE SERPL-SCNC: 97 MMOL/L (ref 98–107)
CHOLEST SERPL-MCNC: 154 MG/DL (ref 0–200)
CO2 SERPL-SCNC: 26.7 MMOL/L (ref 22–29)
CREAT SERPL-MCNC: 0.89 MG/DL (ref 0.57–1)
GLOBULIN SER CALC-MCNC: 2.6 GM/DL
GLUCOSE SERPL-MCNC: 139 MG/DL (ref 65–99)
HBA1C MFR BLD: 6.87 % (ref 4.8–5.6)
HDLC SERPL-MCNC: 51 MG/DL (ref 40–60)
LDLC SERPL CALC-MCNC: 79 MG/DL (ref 0–100)
POTASSIUM SERPL-SCNC: 4.2 MMOL/L (ref 3.5–5.2)
PROT SERPL-MCNC: 6.4 G/DL (ref 6–8.5)
SODIUM SERPL-SCNC: 137 MMOL/L (ref 136–145)
TRIGL SERPL-MCNC: 120 MG/DL (ref 0–150)
TSH SERPL DL<=0.005 MIU/L-ACNC: 2.55 MIU/ML (ref 0.27–4.2)
VLDLC SERPL CALC-MCNC: 24 MG/DL (ref 5–40)

## 2017-08-23 PROCEDURE — 99214 OFFICE O/P EST MOD 30 MIN: CPT | Performed by: FAMILY MEDICINE

## 2017-08-23 PROCEDURE — G0438 PPPS, INITIAL VISIT: HCPCS | Performed by: FAMILY MEDICINE

## 2017-08-23 NOTE — PROGRESS NOTES
Marcelle Johnson is a 84 y.o. female.  Seen 08/23/2017    Assessment/Plan   Problem List Items Addressed This Visit        Cardiovascular and Mediastinum    Hyperlipidemia - Primary    Relevant Orders    Lipid Panel    Hypertension    Relevant Orders    Comprehensive Metabolic Panel       Endocrine    Hypothyroidism    Relevant Orders    TSH    Borderline diabetes    Relevant Orders    Hemoglobin A1c       Musculoskeletal and Integument    Osteoporosis             Follow-up results of blood work and medication management at that time as well as following up in 6 months regimen regularly scheduled  Subjective     Chief Complaint   Patient presents with   • Follow up to hyperlipidemia     patient states she needs blood work   • Follow up to diabetes   • Follow up to osteoporosis     patient is no longer taking prolia   • inital medicare wellness   Hypertension  Social History   Substance Use Topics   • Smoking status: Former Smoker     Years: 5.00     Types: Cigarettes     Quit date: 1966   • Smokeless tobacco: Never Used   • Alcohol use No       History of Present Illness   Patient feels healthy no acute concerns her for follow-up chronic medical problems diabetes osteoporosis hypertension hyperlipidemia.  She is monitoring blood pressure and seems to get a couple readings greater than 150/90 she has no chest pain shortness of breath or increase fatigue potential have lower blood pressure readings she's trying to watch her diet she's not taking probably anymore because of on 1 and side effects with her jaw pain  The following portions of the patient's history were reviewed and updated as appropriate:PMHroutine: Social history , Past Medical History, Surgical history , Allergies, Current Medications, Active Problem List and Health Maintenance    Review of Systems   Constitutional: Negative.    HENT: Negative.    Eyes: Negative.    Respiratory: Negative.    Cardiovascular: Negative.    Gastrointestinal: Negative.   "  Genitourinary: Negative.    Musculoskeletal: Negative.    Neurological: Negative.    Hematological: Negative.        Objective   Vitals:    08/23/17 1055   BP: 142/70   BP Location: Left arm   Patient Position: Sitting   Cuff Size: Adult   Pulse: 77   Resp: 18   Temp: 97.6 °F (36.4 °C)   TempSrc: Oral   SpO2: 97%   Weight: 170 lb 11.2 oz (77.4 kg)   Height: 63.39\" (161 cm)     Body mass index is 29.87 kg/(m^2).  Physical Exam   Constitutional: She is oriented to person, place, and time. She appears well-developed and well-nourished. No distress.   HENT:   Head: Normocephalic and atraumatic.   Eyes: Conjunctivae and EOM are normal. Pupils are equal, round, and reactive to light. Right eye exhibits no discharge. Left eye exhibits no discharge. No scleral icterus.   Neck: Normal range of motion. Neck supple. No tracheal deviation present. No thyromegaly present.   Cardiovascular: Normal rate, regular rhythm, normal heart sounds, intact distal pulses and normal pulses.  Exam reveals no gallop.    No murmur heard.  Pulmonary/Chest: Effort normal and breath sounds normal. No respiratory distress. She has no wheezes. She has no rales.   Musculoskeletal: Normal range of motion.    Marcelle had a diabetic foot exam performed today.   During the foot exam she had a monofilament test performed.    Neurological Sensory Findings -  Unaltered sharp/dull right ankle/foot discrimination and unaltered sharp/dull left ankle/foot discrimination. No right ankle/foot altered proprioception and no left ankle/foot altered proprioception    Vascular Status -  Her exam exhibits right foot vasculature normal. Her exam exhibits no right foot edema. Her exam exhibits left foot vasculature normal. Her exam exhibits no left foot edema.   Skin Integrity  -  Her right foot skin is intact.     Marcelle 's left foot skin is intact. .  Neurological: She is alert and oriented to person, place, and time. She exhibits normal muscle tone. Coordination " normal.   Skin: Skin is warm. No rash noted. No erythema. No pallor.   Psychiatric: She has a normal mood and affect. Her behavior is normal. Judgment and thought content normal.   Nursing note and vitals reviewed.    Reviewed Data:  No visits with results within 1 Month(s) from this visit.  Latest known visit with results is:    Lab on 07/06/2017   Component Date Value Ref Range Status   • Glucose 07/06/2017 160* 65 - 99 mg/dL Final   • BUN 07/06/2017 10  8 - 23 mg/dL Final   • Creatinine 07/06/2017 0.76  0.57 - 1.00 mg/dL Final   • Sodium 07/06/2017 135* 136 - 145 mmol/L Final   • Potassium 07/06/2017 3.8  3.5 - 5.2 mmol/L Final   • Chloride 07/06/2017 98  98 - 107 mmol/L Final   • CO2 07/06/2017 26.7  22.0 - 29.0 mmol/L Final   • Calcium 07/06/2017 9.0  8.6 - 10.5 mg/dL Final   • Total Protein 07/06/2017 6.5  6.0 - 8.5 g/dL Final   • Albumin 07/06/2017 3.70  3.50 - 5.20 g/dL Final   • ALT (SGPT) 07/06/2017 20  1 - 33 U/L Final   • AST (SGOT) 07/06/2017 21  1 - 32 U/L Final   • Alkaline Phosphatase 07/06/2017 61  39 - 117 U/L Final   • Total Bilirubin 07/06/2017 0.3  0.1 - 1.2 mg/dL Final   • eGFR Non African Amer 07/06/2017 73  >60 mL/min/1.73 Final   • Globulin 07/06/2017 2.8  gm/dL Final   • A/G Ratio 07/06/2017 1.3  g/dL Final   • BUN/Creatinine Ratio 07/06/2017 13.2  7.0 - 25.0 Final   • Anion Gap 07/06/2017 10.3  mmol/L Final   • WBC 07/06/2017 9.02  4.50 - 10.70 10*3/mm3 Final   • RBC 07/06/2017 4.55  3.90 - 5.20 10*6/mm3 Final   • Hemoglobin 07/06/2017 13.0  11.9 - 15.5 g/dL Final   • Hematocrit 07/06/2017 38.4  35.6 - 45.5 % Final   • MCV 07/06/2017 84.4  80.5 - 98.2 fL Final   • MCH 07/06/2017 28.6  26.9 - 32.0 pg Final   • MCHC 07/06/2017 33.9  32.4 - 36.3 g/dL Final   • RDW 07/06/2017 13.0  11.7 - 13.0 % Final   • RDW-SD 07/06/2017 39.8  37.0 - 54.0 fl Final   • MPV 07/06/2017 10.4  6.0 - 12.0 fL Final   • Platelets 07/06/2017 185  140 - 500 10*3/mm3 Final   • Neutrophil % 07/06/2017 53.3  42.7 - 76.0  % Final   • Lymphocyte % 07/06/2017 37.5  19.6 - 45.3 % Final   • Monocyte % 07/06/2017 6.0  5.0 - 12.0 % Final   • Eosinophil % 07/06/2017 2.4  0.3 - 6.2 % Final   • Basophil % 07/06/2017 0.6  0.0 - 1.5 % Final   • Immature Grans % 07/06/2017 0.2  0.0 - 0.5 % Final   • Neutrophils, Absolute 07/06/2017 4.81  1.90 - 8.10 10*3/mm3 Final   • Lymphocytes, Absolute 07/06/2017 3.38  0.90 - 4.80 10*3/mm3 Final   • Monocytes, Absolute 07/06/2017 0.54  0.20 - 1.20 10*3/mm3 Final   • Eosinophils, Absolute 07/06/2017 0.22  0.00 - 0.70 10*3/mm3 Final   • Basophils, Absolute 07/06/2017 0.05  0.00 - 0.20 10*3/mm3 Final   • Immature Grans, Absolute 07/06/2017 0.02  0.00 - 0.03 10*3/mm3 Final   • nRBC 07/06/2017 0.0  0.0 - 0.0 /100 WBC Final

## 2017-08-23 NOTE — PROGRESS NOTES
QUICK REFERENCE INFORMATION:  The ABCs of the Annual Wellness Visit    Initial Medicare Wellness Visit    HEALTH RISK ASSESSMENT    1/9/1933    Recent Hospitalizations:  No hospitalization(s) within the last year..        Current Medical Providers:  Patient Care Team:  Jorge Luis Weinberg MD as PCP - General (Family Medicine)  Maria M Galicia MD as PCP - Claims Attributed  Maria M Galicia MD as Consulting Physician (Hematology and Oncology)  James Lee MD as Surgeon (Breast Surgery)  James Lee MD as Referring Physician (Breast Surgery)        Smoking Status:  History   Smoking Status   • Former Smoker   • Years: 5.00   • Types: Cigarettes   • Quit date: 1966   Smokeless Tobacco   • Never Used       Alcohol Consumption:  History   Alcohol Use No       Depression Screen:   PHQ-9 Depression Screening 8/23/2017   Little interest or pleasure in doing things 1   Feeling down, depressed, or hopeless 0   Trouble falling or staying asleep, or sleeping too much 2   Feeling tired or having little energy 3   Poor appetite or overeating 1   Feeling bad about yourself - or that you are a failure or have let yourself or your family down 0   Trouble concentrating on things, such as reading the newspaper or watching television 0   Moving or speaking so slowly that other people could have noticed. Or the opposite - being so fidgety or restless that you have been moving around a lot more than usual 0   Thoughts that you would be better off dead, or of hurting yourself in some way 0   PHQ-9 Total Score 7   If you checked off any problems, how difficult have these problems made it for you to do your work, take care of things at home, or get along with other people? Somewhat difficult       Health Habits and Functional and Cognitive Screening:  Functional & Cognitive Status 8/23/2017   Do you have difficulty preparing food and eating? Yes   Do you have difficulty bathing yourself? No   Do you have difficulty getting  dressed? No   Do you have difficulty using the toilet? No   Do you have difficulty moving around from place to place? No   In the past year have you fallen or experienced a near fall? No   Do you need help using the phone?  No   Are you deaf or do you have serious difficulty hearing?  No   Do you need help with transportation? No   Do you need help shopping? No   Do you need help preparing meals?  No   Do you need help with housework?  No   Do you need help with laundry? No   Do you need help taking your medications? No   Do you need help managing money? No   Do you have difficulty concentrating, remembering or making decisions? No       Health Habits  Current Diet: Diabetic Diet  Dental Exam: Up to date  Eye Exam: Not up to date  Exercise (times per week): 0 times per week  Current Exercise Activities Include: None          Does the patient have evidence of cognitive impairment? No    Asiprin use counseling: Taking ASA appropriately as indicated      Recent Lab Results:    Visual Acuity:  No exam data present    Age-appropriate Screening Schedule:  Refer to the list below for future screening recommendations based on patient's age, sex and/or medical conditions. Orders for these recommended tests are listed in the plan section. The patient has been provided with a written plan.    Health Maintenance   Topic Date Due   • TDAP/TD VACCINES (1 - Tdap) 01/09/1952   • DIABETIC FOOT EXAM  08/05/2016   • DIABETIC EYE EXAM  08/05/2016   • LIPID PANEL  01/05/2017   • HEMOGLOBIN A1C  02/02/2017   • INFLUENZA VACCINE  09/01/2017   • MAMMOGRAM  09/19/2018   • DXA SCAN  10/18/2018   • ZOSTER VACCINE  Completed   • PNEUMOCOCCAL VACCINES (65+ LOW/MEDIUM RISK)  Excluded        Subjective   History of Present Illness    Marcelle Johnson is a 84 y.o. female who presents for an Annual Wellness Visit.    The following portions of the patient's history were reviewed and updated as appropriate: allergies, current medications, past family  history, past medical history, past social history, past surgical history and problem list.    Outpatient Medications Prior to Visit   Medication Sig Dispense Refill   • aspirin 81 MG tablet Take 81 mg by mouth daily.     • Calcium Citrate-Vitamin D (CALCIUM + D PO) Take 1,000 mg by mouth daily.     • Esomeprazole Magnesium (NEXIUM PO) Take 22.3 mg by mouth every morning.     • FIBER PO Take 2 capsules by mouth daily.     • levothyroxine (SYNTHROID, LEVOTHROID) 100 MCG tablet Take 1 tablet by mouth Daily. 90 tablet 0   • pravastatin (PRAVACHOL) 40 MG tablet Take 1 tablet by mouth Every Night. 90 tablet 0   • tamoxifen (NOLVADEX) 20 MG chemo tablet Take 1 tablet by mouth Daily. 90 tablet 2   • valsartan-hydrochlorothiazide (DIOVAN-HCT) 160-12.5 MG per tablet Take 1 tablet by mouth Daily. 90 tablet 0   • vitamin C (ASCORBIC ACID) 500 MG tablet Take 500 mg by mouth Daily As Needed.       No facility-administered medications prior to visit.        Patient Active Problem List   Diagnosis   • Abnormal brain scan   • Abnormal finding on thyroid function test   • Arthritis   • Diabetes mellitus   • Dysphagia   • Hyperlipidemia   • Hypertension   • Hypothyroidism   • Osteoporosis   • Itching   • Borderline diabetes   • Breast cancer of upper-outer quadrant of right female breast   • Medicare annual wellness visit, initial       Advance Care Planning:  has an advance directive - a copy HAS NOT been provided. Have asked the patient to send this to us to add to record.    Identification of Risk Factors:  Risk factors include: weight  and cardiovascular risk.    Review of Systems    Compared to one year ago, the patient feels her physical health is the same.  Compared to one year ago, the patient feels her mental health is the same.    Objective     Physical Exam    Vitals:    08/23/17 1055   BP: 142/70   BP Location: Left arm   Patient Position: Sitting   Cuff Size: Adult   Pulse: 77   Resp: 18   Temp: 97.6 °F (36.4 °C)  "  TempSrc: Oral   SpO2: 97%   Weight: 170 lb 11.2 oz (77.4 kg)   Height: 63.39\" (161 cm)   PainSc: 0-No pain       Body mass index is 29.87 kg/(m^2).  Discussed the patient's BMI with her. The BMI is above average; BMI management plan is completed.    Assessment/Plan   Patient Self-Management and Personalized Health Advice  The patient has been provided with information about: diet, exercise, weight management and prevention of cardiac or vascular disease and preventive services including:   · TdaP vaccine.    Visit Diagnoses:    ICD-10-CM ICD-9-CM   1. Mixed hyperlipidemia E78.2 272.2   2. Essential hypertension I10 401.9   3. Osteoporosis M81.0 733.00   4. Borderline diabetes R73.03 790.29   5. Acquired hypothyroidism E03.9 244.9       No orders of the defined types were placed in this encounter.      Outpatient Encounter Prescriptions as of 8/23/2017   Medication Sig Dispense Refill   • aspirin 81 MG tablet Take 81 mg by mouth daily.     • Calcium Citrate-Vitamin D (CALCIUM + D PO) Take 1,000 mg by mouth daily.     • Esomeprazole Magnesium (NEXIUM PO) Take 22.3 mg by mouth every morning.     • FIBER PO Take 2 capsules by mouth daily.     • levothyroxine (SYNTHROID, LEVOTHROID) 100 MCG tablet Take 1 tablet by mouth Daily. 90 tablet 0   • pravastatin (PRAVACHOL) 40 MG tablet Take 1 tablet by mouth Every Night. 90 tablet 0   • tamoxifen (NOLVADEX) 20 MG chemo tablet Take 1 tablet by mouth Daily. 90 tablet 2   • valsartan-hydrochlorothiazide (DIOVAN-HCT) 160-12.5 MG per tablet Take 1 tablet by mouth Daily. 90 tablet 0   • vitamin C (ASCORBIC ACID) 500 MG tablet Take 500 mg by mouth Daily As Needed.       No facility-administered encounter medications on file as of 8/23/2017.        Reviewed use of high risk medication in the elderly: yes  Reviewed for potential of harmful drug interactions in the elderly: yes    Follow Up:  eye Exam, Tdap through pharmacy    An After Visit Summary and PPPS with all of these plans " were given to the patient.

## 2017-08-24 ENCOUNTER — TELEPHONE (OUTPATIENT)
Dept: INTERNAL MEDICINE | Facility: CLINIC | Age: 82
End: 2017-08-24

## 2017-08-24 NOTE — TELEPHONE ENCOUNTER
----- Message from Jorge Luis Weinberg MD sent at 8/24/2017  7:43 AM EDT -----  Labs ok follow-up 6 months

## 2017-10-03 RX ORDER — PRAVASTATIN SODIUM 40 MG
TABLET ORAL
Qty: 90 TABLET | Refills: 0 | Status: SHIPPED | OUTPATIENT
Start: 2017-10-03 | End: 2018-01-27 | Stop reason: SDUPTHER

## 2017-10-03 RX ORDER — VALSARTAN AND HYDROCHLOROTHIAZIDE 160; 12.5 MG/1; MG/1
TABLET, FILM COATED ORAL
Qty: 90 TABLET | Refills: 0 | Status: SHIPPED | OUTPATIENT
Start: 2017-10-03 | End: 2018-01-11 | Stop reason: SDUPTHER

## 2017-10-03 RX ORDER — LEVOTHYROXINE SODIUM 0.1 MG/1
TABLET ORAL
Qty: 90 TABLET | Refills: 0 | Status: SHIPPED | OUTPATIENT
Start: 2017-10-03 | End: 2018-01-11 | Stop reason: SDUPTHER

## 2017-10-05 ENCOUNTER — APPOINTMENT (OUTPATIENT)
Dept: OTHER | Facility: HOSPITAL | Age: 82
End: 2017-10-05

## 2017-10-05 ENCOUNTER — OFFICE VISIT (OUTPATIENT)
Dept: ONCOLOGY | Facility: CLINIC | Age: 82
End: 2017-10-05

## 2017-10-05 ENCOUNTER — INFUSION (OUTPATIENT)
Dept: ONCOLOGY | Facility: HOSPITAL | Age: 82
End: 2017-10-05

## 2017-10-05 VITALS
TEMPERATURE: 97.6 F | HEIGHT: 63 IN | WEIGHT: 174.2 LBS | HEART RATE: 65 BPM | SYSTOLIC BLOOD PRESSURE: 168 MMHG | RESPIRATION RATE: 16 BRPM | BODY MASS INDEX: 30.87 KG/M2 | OXYGEN SATURATION: 97 % | DIASTOLIC BLOOD PRESSURE: 73 MMHG

## 2017-10-05 DIAGNOSIS — Z92.89 H/O SCREENING MAMMOGRAPHY: ICD-10-CM

## 2017-10-05 DIAGNOSIS — C50.411 MALIGNANT NEOPLASM OF UPPER-OUTER QUADRANT OF RIGHT FEMALE BREAST, UNSPECIFIED ESTROGEN RECEPTOR STATUS (HCC): ICD-10-CM

## 2017-10-05 DIAGNOSIS — Z12.39 SCREENING BREAST EXAMINATION: Primary | ICD-10-CM

## 2017-10-05 DIAGNOSIS — M81.0 OSTEOPOROSIS, UNSPECIFIED OSTEOPOROSIS TYPE, UNSPECIFIED PATHOLOGICAL FRACTURE PRESENCE: Primary | ICD-10-CM

## 2017-10-05 DIAGNOSIS — M81.0 OSTEOPOROSIS, UNSPECIFIED OSTEOPOROSIS TYPE, UNSPECIFIED PATHOLOGICAL FRACTURE PRESENCE: ICD-10-CM

## 2017-10-05 LAB
ALBUMIN SERPL-MCNC: 3.6 G/DL (ref 3.5–5.2)
ALBUMIN/GLOB SERPL: 1.5 G/DL
ALP SERPL-CCNC: 56 U/L (ref 39–117)
ALT SERPL W P-5'-P-CCNC: 17 U/L (ref 1–33)
ANION GAP SERPL CALCULATED.3IONS-SCNC: 12.5 MMOL/L
AST SERPL-CCNC: 22 U/L (ref 1–32)
BASOPHILS # BLD AUTO: 0.05 10*3/MM3 (ref 0–0.2)
BASOPHILS NFR BLD AUTO: 0.5 % (ref 0–1.5)
BILIRUB SERPL-MCNC: 0.4 MG/DL (ref 0.1–1.2)
BUN BLD-MCNC: 10 MG/DL (ref 8–23)
BUN/CREAT SERPL: 12.7 (ref 7–25)
CALCIUM SPEC-SCNC: 8.7 MG/DL (ref 8.6–10.5)
CHLORIDE SERPL-SCNC: 97 MMOL/L (ref 98–107)
CO2 SERPL-SCNC: 24.5 MMOL/L (ref 22–29)
CREAT BLD-MCNC: 0.79 MG/DL (ref 0.57–1)
DEPRECATED RDW RBC AUTO: 41.4 FL (ref 37–54)
EOSINOPHIL # BLD AUTO: 0.25 10*3/MM3 (ref 0–0.7)
EOSINOPHIL NFR BLD AUTO: 2.7 % (ref 0.3–6.2)
ERYTHROCYTE [DISTWIDTH] IN BLOOD BY AUTOMATED COUNT: 13.3 % (ref 11.7–13)
GFR SERPL CREATININE-BSD FRML MDRD: 69 ML/MIN/1.73
GLOBULIN UR ELPH-MCNC: 2.4 GM/DL
GLUCOSE BLD-MCNC: 143 MG/DL (ref 65–99)
HCT VFR BLD AUTO: 36.5 % (ref 35.6–45.5)
HGB BLD-MCNC: 12.6 G/DL (ref 11.9–15.5)
IMM GRANULOCYTES # BLD: 0.01 10*3/MM3 (ref 0–0.03)
IMM GRANULOCYTES NFR BLD: 0.1 % (ref 0–0.5)
LYMPHOCYTES # BLD AUTO: 3.76 10*3/MM3 (ref 0.9–4.8)
LYMPHOCYTES NFR BLD AUTO: 40 % (ref 19.6–45.3)
MCH RBC QN AUTO: 29.4 PG (ref 26.9–32)
MCHC RBC AUTO-ENTMCNC: 34.5 G/DL (ref 32.4–36.3)
MCV RBC AUTO: 85.1 FL (ref 80.5–98.2)
MONOCYTES # BLD AUTO: 0.63 10*3/MM3 (ref 0.2–1.2)
MONOCYTES NFR BLD AUTO: 6.7 % (ref 5–12)
NEUTROPHILS # BLD AUTO: 4.7 10*3/MM3 (ref 1.9–8.1)
NEUTROPHILS NFR BLD AUTO: 50 % (ref 42.7–76)
NRBC BLD MANUAL-RTO: 0 /100 WBC (ref 0–0)
PLATELET # BLD AUTO: 180 10*3/MM3 (ref 140–500)
PMV BLD AUTO: 9.7 FL (ref 6–12)
POTASSIUM BLD-SCNC: 4.3 MMOL/L (ref 3.5–5.2)
PROT SERPL-MCNC: 6 G/DL (ref 6–8.5)
RBC # BLD AUTO: 4.29 10*6/MM3 (ref 3.9–5.2)
SODIUM BLD-SCNC: 134 MMOL/L (ref 136–145)
WBC NRBC COR # BLD: 9.4 10*3/MM3 (ref 4.5–10.7)

## 2017-10-05 PROCEDURE — 25010000002 DENOSUMAB 60 MG/ML SOLUTION: Performed by: INTERNAL MEDICINE

## 2017-10-05 PROCEDURE — 85025 COMPLETE CBC W/AUTO DIFF WBC: CPT | Performed by: INTERNAL MEDICINE

## 2017-10-05 PROCEDURE — 96401 CHEMO ANTI-NEOPL SQ/IM: CPT | Performed by: INTERNAL MEDICINE

## 2017-10-05 PROCEDURE — 36415 COLL VENOUS BLD VENIPUNCTURE: CPT

## 2017-10-05 PROCEDURE — 99214 OFFICE O/P EST MOD 30 MIN: CPT | Performed by: INTERNAL MEDICINE

## 2017-10-05 PROCEDURE — 80053 COMPREHEN METABOLIC PANEL: CPT | Performed by: INTERNAL MEDICINE

## 2017-10-05 RX ADMIN — DENOSUMAB 60 MG: 60 INJECTION SUBCUTANEOUS at 12:06

## 2017-10-05 NOTE — PROGRESS NOTES
Subjective .     REASONS FOR FOLLOWUP: 1.  [id number] invasive ductal carcinoma of the right breast, estrogen receptor positive greater than 90%, progesterone receptor 90%, HER-2/breanne 2+ on immunohistochemistry and negative by fish  2.  Osteoporosis  3. arimidex held secondary to arthralgias.  4. ? Side effect to prolia, patient complained of jaw pain secondary to Prolia and refused any bisphosphonates  5.  Arimidex discontinued with resolution of arthralgias.  6.  Patient started on tamoxifen as of July 6, 2017.    HISTORY OF PRESENT ILLNESS:  The patient is a 84 y.o. year old female who is here for follow-up with the above-mentioned history.    History of Present Illness   patient is a 83-year-old female with a above  history currently here for follow-up.  She is here to start Arimidex for her T1 cN0 M0 invasive ductal carcinoma of the right breast.  She had a bone density testing done which does show osteoporosis. Pt received one dose of prolia and states she felt like her swallowing got worse and had gum pain and now has on and of gum bleeding. She is not wanting to take prolia. She has baseline difficulty swallowing and has required dilatation of esophagus. Does not want oral bisphosphonates. i m unsure if gum pain is secondary to arimidex.las mammogram 9/2016 negative.    Patient feels well today.  The arthralgias are improved after stopping Arimidex.  Patient was started on tamoxifen and she is tolerating it extremely well.    She also has osteoporosis for which she received Prolia more than 6 months ago.  She is overdue for Prolia..  It does not appear that she had a reaction to Prolia.  We will retry today.    Past Medical History:   Diagnosis Date   • Breast cancer of upper-outer quadrant of right female breast 10/13/2016   • Colon cancer     had colon resection   • Diabetes     borderline   • Diverticulosis    • Esophageal stricture     had it stretched via scope   • High cholesterol    • Hypertension    •  Hypothyroid    • Pneumonia        ONCOLOGIC HISTORY:  (History from previous dates can be found in the separate document.)  patient is a 83-year-old female who states that she noticed a lump in the upper outer quadrant of the right breast over the last 2 months. She thinks it was the size of a dime. She then underwent a mammogram on 8/3/2016 which showed a 1 cm mass in the middle third upper outer quadrant of the right breast. Additionly  there was a small asymmetry in the left breast. Spot compression showed partial resolution of this area in the left breast. This measures 0.8 cm. No evidence of axillary adenopathy seen.     Ultrasound was done which shows at 10 o'clock position in the right breast there is a 0.9 cm irregular heterogeneous mass. But no abnormality is seen in the left breast. A six-month follow-up on the left breast is suggested.     Patient then underwent ultrasound-guided biopsy on 8/17/2016. Allergies shows invasive mammary carcinoma ductal type with focal lobular features in the right breast, Nadira score of 5 out of 9. There is no angiolymphatic invasion seen. Maximum dimension of invasive carcinoma in 7 mm. No carcinoma in situ identified. Estrogen receptors were greater than 90%, progesterone receptor is 90%, HER-2/breanne was 2+ on immunohistochemistry and negative by fish.     MRI of the breast shows middle third upper quadrant of the right breast at 10 o'clock position the biopsy cavity measured 1.1 cm from superior to inferior dimension 1.3 cm in anterior to posterior dimension and 2.1 cm from medial to lateral dimension. There were no other areas of enhancement in the left breast.  Chest x-ray no acute infiltrate.     Patient underwent lumpectomy on the right side with sentinel lymph node biopsy by Dr. Lee on 9/19/2016. Patient had invasive carcinoma 1.2 cm with lobular features. The overall grade is grade 2 with her [employer] score of 6 out of 9. He had focal ductal carcinoma in  situ and focal atypical lobular hyperplasia as well. The ductal carcinoma in situ was less than 1 mm, cribriform pattern and low nuclear grade grade 1. The margins were indeterminate for invasive carcinoma but uninvolved by DCIS. Patient has had reexcised margins and invasive carcinoma is present only in the reexcised anterior margin. The tumor focally extends to within 1 mm of the reexcised anterior margin. The fibroinflammatory biopsy skin changes extend to the anterior margin. I will need to discuss with the pathologist about the margins. She has extends to within 3 mm was to margins and to within 5 mm in the inferior margin. The number of sentinel lymph nodes examined is to and there is no evidence of my or macro metastasis and no isolated tumor cells.     Patient now has been referred to radiation oncology and us for further options of treatment.  Given her age, her low stage, her strong estrogen and progesterone receptor positivity Dr. Nolasco felt that she does not need to undergo radiation.    Patient's bone density done shows osteoporosis.  Discussion done about consideration of tamoxifen versus Arimidex with prolia.  Patient refuses tamoxifen and is willing to consider Arimidex.  We did discuss that it'll be important for her to take prolia , calcium and vitamin D.  Pt could not tolerate prolia .    Pt on arimidex  and likely change to tamoxifen. Due to side effects    Arimidex discontinued secondary to severe arthralgias.  Tamoxifen started as of July 6, 2017.    .  Past medical history: Patient was diagnosed with the colon cancer back in 1989 and at the same time she had her ovaries removed and appendix and gallbladder removed. She didn't require any chemotherapy at the time.  Patient had pneumonia back in 1995.  Her hypertension  High cholesterol  Hypothyroidism  Patient has had colonoscopy 5 years ago by Dr. Tanner and apparently had a polyp she was to return to him for another colonoscopy in 5  years.  Patient had esophageal stricture as a result she underwent dilation by Dr. Hopson last year.  She underwent parathyroidectomy.  She has borderline diabetes. On diet control     OB/GYN history she started metastases. At age 12, had menopause at age 50 when she underwent oophorectomy. She is  2 para 2 no miscarriages she has 2 children one is 54 and second one is 51-year-old and one grandchild.       Current Outpatient Prescriptions on File Prior to Visit   Medication Sig Dispense Refill   • aspirin 81 MG tablet Take 81 mg by mouth daily.     • Calcium Citrate-Vitamin D (CALCIUM + D PO) Take 1,000 mg by mouth daily.     • Esomeprazole Magnesium (NEXIUM PO) Take 22.3 mg by mouth every morning.     • FIBER PO Take 2 capsules by mouth daily.     • levothyroxine (SYNTHROID, LEVOTHROID) 100 MCG tablet TAKE 1 TABLET BY MOUTH DAILY. 90 tablet 0   • pravastatin (PRAVACHOL) 40 MG tablet TAKE 1 TABLET BY MOUTH EVERY NIGHT. 90 tablet 0   • tamoxifen (NOLVADEX) 20 MG chemo tablet Take 1 tablet by mouth Daily. 90 tablet 2   • valsartan-hydrochlorothiazide (DIOVAN-HCT) 160-12.5 MG per tablet TAKE 1 TABLET BY MOUTH DAILY. 90 tablet 0   • vitamin C (ASCORBIC ACID) 500 MG tablet Take 500 mg by mouth Daily As Needed.       No current facility-administered medications on file prior to visit.        ALLERGIES:     Allergies   Allergen Reactions   • Morphine And Related Nausea And Vomiting   • Prolia [Denosumab] Other (See Comments)     Gum problems       Social History     Social History   • Marital status:      Spouse name: N/A   • Number of children: 2   • Years of education: High school     Occupational History   •  Retired     Social History Main Topics   • Smoking status: Former Smoker     Years: 5.00     Types: Cigarettes     Quit date:    • Smokeless tobacco: Never Used   • Alcohol use No   • Drug use: No   • Sexual activity: Defer     Other Topics Concern   • Not on file     Social History Narrative  "        Cancer-related family history is negative for Breast cancer, Colon cancer, Endometrial cancer, and Ovarian cancer.     Review of Systems  A comprehensive 14 point review of systems was performed and was negative except as mentioned.    Objective      Vitals:    10/05/17 1115   BP: 168/73   Pulse: 65   Resp: 16   Temp: 97.6 °F (36.4 °C)   TempSrc: Oral   SpO2: 97%   Weight: 174 lb 3.2 oz (79 kg)   Height: 63.39\" (161 cm)   PainSc: 0-No pain     Current Status 10/5/2017   ECOG score 0       Physical Exam    GENERAL:  Well-developed, well-nourished in no acute distress.   NECK:  Supple with good range of motion; no thyromegaly or masses, no JVD.  LYMPHATICS:  No cervical, supraclavicular, axillary or inguinal adenopathy.  CHEST:  Lungs clear to percussion and auscultation. Good airflow.  BREASTS: NO MASSES IN BILATERAL BREAST.no nipple discharge.no axillary or supraclavicular adenopathy.  CARDIAC:  Regular rate and rhythm without murmurs, rubs or gallops. Normal S1,S2.  ABDOMEN:  Soft, nontender with no organomegaly or masses.  EXTREMITIES:  No clubbing, cyanosis or edema.  NEUROLOGICAL:  Cranial Nerves II-XII grossly intact.  No focal neurological deficits.  PSYCHIATRIC:  Normal affect and mood.      RECENT LABS:  Hematology WBC   Date Value Ref Range Status   10/05/2017 9.40 4.50 - 10.70 10*3/mm3 Final     RBC   Date Value Ref Range Status   10/05/2017 4.29 3.90 - 5.20 10*6/mm3 Final   02/25/2016 4.63  Final     Hemoglobin   Date Value Ref Range Status   10/05/2017 12.6 11.9 - 15.5 g/dL Final     Hematocrit   Date Value Ref Range Status   10/05/2017 36.5 35.6 - 45.5 % Final     Platelets   Date Value Ref Range Status   10/05/2017 180 140 - 500 10*3/mm3 Final        Assessment/Plan        1. T1 cN0 M0 invasive ductal carcinoma of the right breast, with lobular features, ER positive at 90%, MO positive at 90%, HER-2/breanne 2+ by minimal histochemistry and negative by fish with a HER-2/CEP ratio of 1.1.  Patient " was started on Arimidex 1 mg daily but due to severe arthralgias was discontinued.  After discontinuation arthralgias improved.  Patient was started on tamoxifen and tolerated it very well.      2. Osteoporosis:  She is on  calcium 1200 mg once daily, vitamin D 1000 international units daily as well.  Patient refuses Prolia because of side effect of jaw pain after receiving Prolia.     She is now willing to consider trying Prolia again.  If she has recurrence of jaw pain then we may need to discontinue Prolia.    Plan 1.  Continue tamoxifen    2.  Will give Prolia second injection today.  If she has gum pain again then she may consider discontinuing it.     Maria M Galicia MD     Cc: Dr. James Lee

## 2017-10-14 ENCOUNTER — HOSPITAL ENCOUNTER (OUTPATIENT)
Dept: MAMMOGRAPHY | Facility: HOSPITAL | Age: 82
Discharge: HOME | End: 2017-10-14
Attending: INTERNAL MEDICINE | Admitting: INTERNAL MEDICINE

## 2017-10-14 DIAGNOSIS — Z12.39 SCREENING BREAST EXAMINATION: ICD-10-CM

## 2017-10-14 PROCEDURE — G0202 SCR MAMMO BI INCL CAD: HCPCS

## 2018-01-11 RX ORDER — LEVOTHYROXINE SODIUM 0.1 MG/1
TABLET ORAL
Qty: 90 TABLET | Refills: 0 | Status: SHIPPED | OUTPATIENT
Start: 2018-01-11 | End: 2018-03-25 | Stop reason: SDUPTHER

## 2018-01-11 RX ORDER — VALSARTAN AND HYDROCHLOROTHIAZIDE 160; 12.5 MG/1; MG/1
TABLET, FILM COATED ORAL
Qty: 90 TABLET | Refills: 0 | Status: SHIPPED | OUTPATIENT
Start: 2018-01-11 | End: 2018-03-25 | Stop reason: SDUPTHER

## 2018-01-29 RX ORDER — PRAVASTATIN SODIUM 40 MG
TABLET ORAL
Qty: 90 TABLET | Refills: 0 | Status: SHIPPED | OUTPATIENT
Start: 2018-01-29 | End: 2018-05-21 | Stop reason: SDUPTHER

## 2018-02-21 ENCOUNTER — OFFICE VISIT (OUTPATIENT)
Dept: INTERNAL MEDICINE | Facility: CLINIC | Age: 83
End: 2018-02-21

## 2018-02-21 VITALS
BODY MASS INDEX: 30.94 KG/M2 | HEART RATE: 77 BPM | OXYGEN SATURATION: 96 % | SYSTOLIC BLOOD PRESSURE: 128 MMHG | TEMPERATURE: 98.3 F | HEIGHT: 63 IN | WEIGHT: 174.6 LBS | DIASTOLIC BLOOD PRESSURE: 58 MMHG

## 2018-02-21 DIAGNOSIS — E78.2 MIXED HYPERLIPIDEMIA: Primary | ICD-10-CM

## 2018-02-21 DIAGNOSIS — E03.9 ACQUIRED HYPOTHYROIDISM: ICD-10-CM

## 2018-02-21 DIAGNOSIS — E87.1 HYPONATREMIA: ICD-10-CM

## 2018-02-21 DIAGNOSIS — E11.9 TYPE 2 DIABETES MELLITUS WITHOUT COMPLICATION, WITHOUT LONG-TERM CURRENT USE OF INSULIN (HCC): ICD-10-CM

## 2018-02-21 DIAGNOSIS — I10 ESSENTIAL HYPERTENSION: ICD-10-CM

## 2018-02-21 LAB
BUN SERPL-MCNC: 11 MG/DL (ref 8–23)
BUN/CREAT SERPL: 13.6 (ref 7–25)
CALCIUM SERPL-MCNC: 8.8 MG/DL (ref 8.6–10.5)
CHLORIDE SERPL-SCNC: 99 MMOL/L (ref 98–107)
CO2 SERPL-SCNC: 27.3 MMOL/L (ref 22–29)
CREAT SERPL-MCNC: 0.81 MG/DL (ref 0.57–1)
GFR SERPLBLD CREATININE-BSD FMLA CKD-EPI: 67 ML/MIN/1.73
GFR SERPLBLD CREATININE-BSD FMLA CKD-EPI: 81 ML/MIN/1.73
GLUCOSE SERPL-MCNC: 134 MG/DL (ref 65–99)
HBA1C MFR BLD: 6.5 % (ref 4.8–5.6)
POTASSIUM SERPL-SCNC: 4.1 MMOL/L (ref 3.5–5.2)
SODIUM SERPL-SCNC: 138 MMOL/L (ref 136–145)

## 2018-02-21 PROCEDURE — 99214 OFFICE O/P EST MOD 30 MIN: CPT | Performed by: FAMILY MEDICINE

## 2018-02-21 NOTE — PROGRESS NOTES
Marcelle Johnson is a 85 y.o. female.      Assessment/Plan   Problem List Items Addressed This Visit        Cardiovascular and Mediastinum    Hyperlipidemia - Primary    Hypertension    Relevant Orders    Basic Metabolic Panel       Endocrine    Diabetes mellitus    Relevant Orders    Hemoglobin A1c    Hypothyroidism       Other    Hyponatremia           Follow-up results of blood work continue present management of chronic medical problems encourage weight loss with 1600-calorie diet and increase physical activity  Follow-up as needed or 6 months    Chief Complaint   Patient presents with   • follow up to hypertension   • follow up to hyperlipidemia   • follow up to hypothyroidism     Social History   Substance Use Topics   • Smoking status: Former Smoker     Years: 5.00     Types: Cigarettes     Quit date: 1966   • Smokeless tobacco: Never Used   • Alcohol use No       History of Present Illness   Patient visit for follow-up of chronic medical problems of hypertension hyperlipidemia hypothyroidism that are fairly stable she has no acute concerns and feels things are going well she feels that she should be more active and she thatshehasnovisionproblemsmuscleachesorrashes.no increased fatigue  The following portions of the patient's history were reviewed and updated as appropriate:PMHroutine: Social history , Past Medical History, Allergies, Current Medications, Active Problem List, Family History and Health Maintenance    Review of Systems   Constitutional: Negative.    HENT: Negative.    Eyes: Negative.    Respiratory: Negative.    Cardiovascular: Negative.    Gastrointestinal: Negative.    Genitourinary: Negative.    Musculoskeletal: Negative.    Neurological: Negative.    Hematological: Negative.        Objective   Vitals:    02/21/18 0954   BP: 128/58   BP Location: Left arm   Patient Position: Sitting   Cuff Size: Large Adult   Pulse: 77   Temp: 98.3 °F (36.8 °C)   TempSrc: Oral   SpO2: 96%   Weight: 79.2 kg  "(174 lb 9.6 oz)   Height: 161 cm (63.39\")     Body mass index is 30.55 kg/(m^2).  Physical Exam   Constitutional: She is oriented to person, place, and time. She appears well-developed and well-nourished. No distress.   HENT:   Head: Normocephalic and atraumatic.   Eyes: Conjunctivae and EOM are normal. Pupils are equal, round, and reactive to light. Right eye exhibits no discharge. Left eye exhibits no discharge. No scleral icterus.   Neck: Normal range of motion. Neck supple. No tracheal deviation present. No thyromegaly present.   Cardiovascular: Normal rate, regular rhythm, normal heart sounds, intact distal pulses and normal pulses.  Exam reveals no gallop.    No murmur heard.  Pulmonary/Chest: Effort normal and breath sounds normal. No respiratory distress. She has no wheezes. She has no rales.   Musculoskeletal: Normal range of motion.   Neurological: She is alert and oriented to person, place, and time. She exhibits normal muscle tone. Coordination normal.   Skin: Skin is warm. No rash noted. No erythema. No pallor.   Psychiatric: She has a normal mood and affect. Her behavior is normal. Judgment and thought content normal.   Nursing note and vitals reviewed.    Reviewed Data:  No visits with results within 1 Month(s) from this visit.  Latest known visit with results is:    Office Visit on 08/23/2017   Component Date Value Ref Range Status   • Total Cholesterol 08/23/2017 154  0 - 200 mg/dL Final   • Triglycerides 08/23/2017 120  0 - 150 mg/dL Final   • HDL Cholesterol 08/23/2017 51  40 - 60 mg/dL Final   • VLDL Cholesterol 08/23/2017 24  5 - 40 mg/dL Final   • LDL Cholesterol  08/23/2017 79  0 - 100 mg/dL Final   • Hemoglobin A1C 08/23/2017 6.87* 4.80 - 5.60 % Final    Comment: Hemoglobin A1C Ranges:  Increased Risk for Diabetes  5.7% to 6.4%  Diabetes                     >= 6.5%  Diabetic Goal                < 7.0%     • Glucose 08/23/2017 139* 65 - 99 mg/dL Final   • BUN 08/23/2017 12  8 - 23 mg/dL Final "   • Creatinine 08/23/2017 0.89  0.57 - 1.00 mg/dL Final   • eGFR Non  Am 08/23/2017 60* >60 mL/min/1.73 Final    Comment: The MDRD GFR formula is only valid for adults with stable  renal function between ages 18 and 70.     • eGFR  Am 08/23/2017 73  >60 mL/min/1.73 Final   • BUN/Creatinine Ratio 08/23/2017 13.5  7.0 - 25.0 Final   • Sodium 08/23/2017 137  136 - 145 mmol/L Final   • Potassium 08/23/2017 4.2  3.5 - 5.2 mmol/L Final   • Chloride 08/23/2017 97* 98 - 107 mmol/L Final   • Total CO2 08/23/2017 26.7  22.0 - 29.0 mmol/L Final   • Calcium 08/23/2017 9.2  8.6 - 10.5 mg/dL Final   • Total Protein 08/23/2017 6.4  6.0 - 8.5 g/dL Final   • Albumin 08/23/2017 3.80  3.50 - 5.20 g/dL Final   • Globulin 08/23/2017 2.6  gm/dL Final   • A/G Ratio 08/23/2017 1.5  g/dL Final   • Total Bilirubin 08/23/2017 0.3  0.1 - 1.2 mg/dL Final   • Alkaline Phosphatase 08/23/2017 56  39 - 117 U/L Final   • AST (SGOT) 08/23/2017 19  1 - 32 U/L Final   • ALT (SGPT) 08/23/2017 18  1 - 33 U/L Final   • TSH 08/23/2017 2.550  0.270 - 4.200 mIU/mL Final

## 2018-02-23 ENCOUNTER — TELEPHONE (OUTPATIENT)
Dept: INTERNAL MEDICINE | Facility: CLINIC | Age: 83
End: 2018-02-23

## 2018-03-06 ENCOUNTER — OFFICE VISIT (OUTPATIENT)
Dept: INTERNAL MEDICINE | Facility: CLINIC | Age: 83
End: 2018-03-06

## 2018-03-06 VITALS
HEIGHT: 63 IN | HEART RATE: 95 BPM | WEIGHT: 163.4 LBS | OXYGEN SATURATION: 95 % | BODY MASS INDEX: 28.95 KG/M2 | SYSTOLIC BLOOD PRESSURE: 122 MMHG | TEMPERATURE: 98.1 F | DIASTOLIC BLOOD PRESSURE: 56 MMHG

## 2018-03-06 DIAGNOSIS — J40 BRONCHITIS: Primary | ICD-10-CM

## 2018-03-06 PROCEDURE — 99213 OFFICE O/P EST LOW 20 MIN: CPT | Performed by: FAMILY MEDICINE

## 2018-03-06 RX ORDER — AZITHROMYCIN 250 MG/1
TABLET, FILM COATED ORAL
Qty: 6 TABLET | Refills: 0 | Status: SHIPPED | OUTPATIENT
Start: 2018-03-06 | End: 2018-04-14

## 2018-03-06 NOTE — PROGRESS NOTES
Marcelle Johnson is a 85 y.o. female.      Assessment/Plan   Problem List Items Addressed This Visit        Respiratory    Bronchitis - Primary           Had Zithromax monitor blood pressure and to new guaifenesin DM over-the-counter for cough follow-up if symptoms worsen      Chief Complaint   Patient presents with   • deep cough     since Friday   • Generalized Body Aches   • sinus drainage     Social History   Substance Use Topics   • Smoking status: Former Smoker     Years: 5.00     Types: Cigarettes     Quit date: 1966   • Smokeless tobacco: Never Used   • Alcohol use No       History of Present Illness   Patient has 4 day history of cough with myalgias and sinus drainage minimal improvement with over-the-counter medication symptoms are persistent no specific fever she hasn't taken her temperature cough is minimally productive  The following portions of the patient's history were reviewed and updated as appropriate:PMHroutine: Social history , Past Medical History, Allergies, Current Medications, Active Problem List and Health Maintenance    Review of Systems   Constitutional: Positive for fatigue. Negative for activity change and unexpected weight change.   HENT: Positive for congestion, postnasal drip and sore throat. Negative for ear pain.    Eyes: Negative for pain and discharge.   Respiratory: Positive for cough. Negative for chest tightness, shortness of breath and wheezing.    Cardiovascular: Negative for chest pain and palpitations.   Gastrointestinal: Negative for abdominal pain, diarrhea and vomiting.   Endocrine: Negative.    Genitourinary: Negative.    Musculoskeletal: Negative for joint swelling.   Skin: Negative for color change, rash and wound.   Allergic/Immunologic: Negative.    Neurological: Negative for seizures and syncope.   Psychiatric/Behavioral: Negative.        Objective   Vitals:    03/06/18 1034   BP: 122/56   BP Location: Right arm   Patient Position: Sitting   Cuff Size: Large Adult  "  Pulse: 95   Temp: 98.1 °F (36.7 °C)   TempSrc: Oral   SpO2: 95%   Weight: 74.1 kg (163 lb 6.4 oz)   Height: 161 cm (63.39\")     Body mass index is 28.59 kg/(m^2).  Physical Exam   Constitutional: She is oriented to person, place, and time. She appears well-developed and well-nourished.  Non-toxic appearance. No distress.   HENT:   Head: Normocephalic and atraumatic. Hair is normal.   Right Ear: External ear normal. No drainage, swelling or tenderness. Tympanic membrane is retracted.   Left Ear: External ear normal. No drainage, swelling or tenderness. Tympanic membrane is retracted.   Nose: Mucosal edema present. No epistaxis.   Mouth/Throat: Uvula is midline and mucous membranes are normal. No oral lesions. No uvula swelling. Posterior oropharyngeal erythema present. No oropharyngeal exudate.   Eyes: Conjunctivae and EOM are normal. Pupils are equal, round, and reactive to light. Right eye exhibits no discharge. Left eye exhibits no discharge. No scleral icterus.   Neck: Normal range of motion. Neck supple.   Cardiovascular: Normal rate, regular rhythm and normal heart sounds.  Exam reveals no gallop.    No murmur heard.  Pulmonary/Chest: Breath sounds normal. No stridor. No respiratory distress. She has no wheezes. She has no rales. She exhibits no tenderness.   Abdominal: Soft. There is no tenderness.   Lymphadenopathy:     She has cervical adenopathy.   Neurological: She is alert and oriented to person, place, and time. She exhibits normal muscle tone.   Skin: Skin is warm and dry. No rash noted. She is not diaphoretic.   Psychiatric: She has a normal mood and affect. Her behavior is normal. Judgment and thought content normal.   Nursing note and vitals reviewed.    Reviewed Data:  Office Visit on 02/21/2018   Component Date Value Ref Range Status   • Glucose 02/21/2018 134* 65 - 99 mg/dL Final   • BUN 02/21/2018 11  8 - 23 mg/dL Final   • Creatinine 02/21/2018 0.81  0.57 - 1.00 mg/dL Final   • eGFR Non "  Am 02/21/2018 67  >60 mL/min/1.73 Final    Comment: The MDRD GFR formula is only valid for adults with stable  renal function between ages 18 and 70.     • eGFR  Am 02/21/2018 81  >60 mL/min/1.73 Final   • BUN/Creatinine Ratio 02/21/2018 13.6  7.0 - 25.0 Final   • Sodium 02/21/2018 138  136 - 145 mmol/L Final   • Potassium 02/21/2018 4.1  3.5 - 5.2 mmol/L Final   • Chloride 02/21/2018 99  98 - 107 mmol/L Final   • Total CO2 02/21/2018 27.3  22.0 - 29.0 mmol/L Final   • Calcium 02/21/2018 8.8  8.6 - 10.5 mg/dL Final   • Hemoglobin A1C 02/21/2018 6.50* 4.80 - 5.60 % Final    Comment: Hemoglobin A1C Ranges:  Increased Risk for Diabetes  5.7% to 6.4%  Diabetes                     >= 6.5%  Diabetic Goal                < 7.0%

## 2018-03-26 RX ORDER — VALSARTAN AND HYDROCHLOROTHIAZIDE 160; 12.5 MG/1; MG/1
TABLET, FILM COATED ORAL
Qty: 90 TABLET | Refills: 0 | Status: SHIPPED | OUTPATIENT
Start: 2018-03-26 | End: 2018-07-26 | Stop reason: SDUPTHER

## 2018-03-26 RX ORDER — LEVOTHYROXINE SODIUM 0.1 MG/1
TABLET ORAL
Qty: 90 TABLET | Refills: 0 | Status: SHIPPED | OUTPATIENT
Start: 2018-03-26 | End: 2018-06-21 | Stop reason: SDUPTHER

## 2018-03-26 RX ORDER — TAMOXIFEN CITRATE 20 MG/1
TABLET ORAL
Qty: 90 TABLET | Refills: 2 | Status: SHIPPED | OUTPATIENT
Start: 2018-03-26 | End: 2018-12-20 | Stop reason: SDUPTHER

## 2018-04-19 ENCOUNTER — APPOINTMENT (OUTPATIENT)
Dept: LAB | Facility: HOSPITAL | Age: 83
End: 2018-04-19

## 2018-04-26 ENCOUNTER — LAB (OUTPATIENT)
Dept: OTHER | Facility: HOSPITAL | Age: 83
End: 2018-04-26

## 2018-04-26 ENCOUNTER — OFFICE VISIT (OUTPATIENT)
Dept: ONCOLOGY | Facility: CLINIC | Age: 83
End: 2018-04-26

## 2018-04-26 ENCOUNTER — INFUSION (OUTPATIENT)
Dept: ONCOLOGY | Facility: HOSPITAL | Age: 83
End: 2018-04-26

## 2018-04-26 VITALS
HEART RATE: 80 BPM | BODY MASS INDEX: 30.83 KG/M2 | TEMPERATURE: 98.4 F | WEIGHT: 174 LBS | SYSTOLIC BLOOD PRESSURE: 156 MMHG | OXYGEN SATURATION: 97 % | HEIGHT: 63 IN | RESPIRATION RATE: 12 BRPM | DIASTOLIC BLOOD PRESSURE: 69 MMHG

## 2018-04-26 DIAGNOSIS — Z12.39 SCREENING BREAST EXAMINATION: ICD-10-CM

## 2018-04-26 DIAGNOSIS — C50.919 MALIGNANT NEOPLASM OF BREAST IN FEMALE, ESTROGEN RECEPTOR POSITIVE, UNSPECIFIED LATERALITY, UNSPECIFIED SITE OF BREAST (HCC): Primary | ICD-10-CM

## 2018-04-26 DIAGNOSIS — M81.8 OTHER OSTEOPOROSIS WITHOUT CURRENT PATHOLOGICAL FRACTURE: ICD-10-CM

## 2018-04-26 DIAGNOSIS — Z17.0 MALIGNANT NEOPLASM OF BREAST IN FEMALE, ESTROGEN RECEPTOR POSITIVE, UNSPECIFIED LATERALITY, UNSPECIFIED SITE OF BREAST (HCC): Primary | ICD-10-CM

## 2018-04-26 DIAGNOSIS — Z92.89 H/O SCREENING MAMMOGRAPHY: ICD-10-CM

## 2018-04-26 DIAGNOSIS — M81.8 OTHER OSTEOPOROSIS WITHOUT CURRENT PATHOLOGICAL FRACTURE: Primary | ICD-10-CM

## 2018-04-26 DIAGNOSIS — M81.0 OSTEOPOROSIS, UNSPECIFIED OSTEOPOROSIS TYPE, UNSPECIFIED PATHOLOGICAL FRACTURE PRESENCE: ICD-10-CM

## 2018-04-26 LAB
ALBUMIN SERPL-MCNC: 3.7 G/DL (ref 3.5–5.2)
ALBUMIN/GLOB SERPL: 1.5 G/DL
ALP SERPL-CCNC: 49 U/L (ref 39–117)
ALT SERPL W P-5'-P-CCNC: 20 U/L (ref 1–33)
ANION GAP SERPL CALCULATED.3IONS-SCNC: 13.5 MMOL/L
AST SERPL-CCNC: 29 U/L (ref 1–32)
BASOPHILS # BLD AUTO: 0.06 10*3/MM3 (ref 0–0.2)
BASOPHILS NFR BLD AUTO: 0.7 % (ref 0–1.5)
BILIRUB SERPL-MCNC: 0.4 MG/DL (ref 0.1–1.2)
BUN BLD-MCNC: 11 MG/DL (ref 8–23)
BUN/CREAT SERPL: 14.7 (ref 7–25)
CALCIUM SPEC-SCNC: 8.7 MG/DL (ref 8.6–10.5)
CHLORIDE SERPL-SCNC: 94 MMOL/L (ref 98–107)
CO2 SERPL-SCNC: 25.5 MMOL/L (ref 22–29)
CREAT BLD-MCNC: 0.75 MG/DL (ref 0.57–1)
DEPRECATED RDW RBC AUTO: 41.7 FL (ref 37–54)
EOSINOPHIL # BLD AUTO: 0.22 10*3/MM3 (ref 0–0.7)
EOSINOPHIL NFR BLD AUTO: 2.6 % (ref 0.3–6.2)
ERYTHROCYTE [DISTWIDTH] IN BLOOD BY AUTOMATED COUNT: 13.1 % (ref 11.7–13)
GFR SERPL CREATININE-BSD FRML MDRD: 73 ML/MIN/1.73
GLOBULIN UR ELPH-MCNC: 2.4 GM/DL
GLUCOSE BLD-MCNC: 192 MG/DL (ref 65–99)
HCT VFR BLD AUTO: 36.9 % (ref 35.6–45.5)
HGB BLD-MCNC: 12.5 G/DL (ref 11.9–15.5)
IMM GRANULOCYTES # BLD: 0.02 10*3/MM3 (ref 0–0.03)
IMM GRANULOCYTES NFR BLD: 0.2 % (ref 0–0.5)
LYMPHOCYTES # BLD AUTO: 3.29 10*3/MM3 (ref 0.9–4.8)
LYMPHOCYTES NFR BLD AUTO: 38.4 % (ref 19.6–45.3)
MAGNESIUM SERPL-MCNC: 1.8 MG/DL (ref 1.6–2.4)
MCH RBC QN AUTO: 29.6 PG (ref 26.9–32)
MCHC RBC AUTO-ENTMCNC: 33.9 G/DL (ref 32.4–36.3)
MCV RBC AUTO: 87.2 FL (ref 80.5–98.2)
MONOCYTES # BLD AUTO: 0.48 10*3/MM3 (ref 0.2–1.2)
MONOCYTES NFR BLD AUTO: 5.6 % (ref 5–12)
NEUTROPHILS # BLD AUTO: 4.49 10*3/MM3 (ref 1.9–8.1)
NEUTROPHILS NFR BLD AUTO: 52.5 % (ref 42.7–76)
NRBC BLD MANUAL-RTO: 0 /100 WBC (ref 0–0)
PHOSPHATE SERPL-MCNC: 3.3 MG/DL (ref 2.5–4.5)
PLATELET # BLD AUTO: 183 10*3/MM3 (ref 140–500)
PMV BLD AUTO: 10 FL (ref 6–12)
POTASSIUM BLD-SCNC: 4 MMOL/L (ref 3.5–5.2)
PROT SERPL-MCNC: 6.1 G/DL (ref 6–8.5)
RBC # BLD AUTO: 4.23 10*6/MM3 (ref 3.9–5.2)
SODIUM BLD-SCNC: 133 MMOL/L (ref 136–145)
WBC NRBC COR # BLD: 8.56 10*3/MM3 (ref 4.5–10.7)

## 2018-04-26 PROCEDURE — 36415 COLL VENOUS BLD VENIPUNCTURE: CPT

## 2018-04-26 PROCEDURE — 99213 OFFICE O/P EST LOW 20 MIN: CPT | Performed by: INTERNAL MEDICINE

## 2018-04-26 PROCEDURE — 83735 ASSAY OF MAGNESIUM: CPT | Performed by: NURSE PRACTITIONER

## 2018-04-26 PROCEDURE — 85025 COMPLETE CBC W/AUTO DIFF WBC: CPT | Performed by: INTERNAL MEDICINE

## 2018-04-26 PROCEDURE — 80053 COMPREHEN METABOLIC PANEL: CPT | Performed by: INTERNAL MEDICINE

## 2018-04-26 PROCEDURE — 25010000002 DENOSUMAB 60 MG/ML SOLUTION: Performed by: INTERNAL MEDICINE

## 2018-04-26 PROCEDURE — 96372 THER/PROPH/DIAG INJ SC/IM: CPT | Performed by: INTERNAL MEDICINE

## 2018-04-26 PROCEDURE — 84100 ASSAY OF PHOSPHORUS: CPT | Performed by: NURSE PRACTITIONER

## 2018-04-26 RX ADMIN — DENOSUMAB 60 MG: 60 INJECTION SUBCUTANEOUS at 11:34

## 2018-05-21 RX ORDER — PRAVASTATIN SODIUM 40 MG
TABLET ORAL
Qty: 90 TABLET | Refills: 0 | Status: SHIPPED | OUTPATIENT
Start: 2018-05-21 | End: 2018-08-21 | Stop reason: SDUPTHER

## 2018-06-21 RX ORDER — LEVOTHYROXINE SODIUM 0.1 MG/1
TABLET ORAL
Qty: 90 TABLET | Refills: 0 | Status: SHIPPED | OUTPATIENT
Start: 2018-06-21 | End: 2018-10-23 | Stop reason: SDUPTHER

## 2018-07-26 RX ORDER — VALSARTAN AND HYDROCHLOROTHIAZIDE 160; 12.5 MG/1; MG/1
TABLET, FILM COATED ORAL
Qty: 90 TABLET | Refills: 0 | Status: SHIPPED | OUTPATIENT
Start: 2018-07-26 | End: 2018-08-21 | Stop reason: ALTCHOICE

## 2018-08-21 ENCOUNTER — OFFICE VISIT (OUTPATIENT)
Dept: INTERNAL MEDICINE | Facility: CLINIC | Age: 83
End: 2018-08-21

## 2018-08-21 VITALS
HEART RATE: 80 BPM | SYSTOLIC BLOOD PRESSURE: 131 MMHG | RESPIRATION RATE: 16 BRPM | DIASTOLIC BLOOD PRESSURE: 70 MMHG | WEIGHT: 166.2 LBS | OXYGEN SATURATION: 98 % | HEIGHT: 63 IN | BODY MASS INDEX: 29.45 KG/M2

## 2018-08-21 DIAGNOSIS — I10 ESSENTIAL HYPERTENSION: ICD-10-CM

## 2018-08-21 DIAGNOSIS — E03.9 ACQUIRED HYPOTHYROIDISM: ICD-10-CM

## 2018-08-21 DIAGNOSIS — E11.9 TYPE 2 DIABETES MELLITUS WITHOUT COMPLICATION, WITHOUT LONG-TERM CURRENT USE OF INSULIN (HCC): ICD-10-CM

## 2018-08-21 DIAGNOSIS — E78.2 MIXED HYPERLIPIDEMIA: Primary | ICD-10-CM

## 2018-08-21 PROCEDURE — 99214 OFFICE O/P EST MOD 30 MIN: CPT | Performed by: FAMILY MEDICINE

## 2018-08-21 RX ORDER — LEVOTHYROXINE SODIUM 0.1 MG/1
100 TABLET ORAL DAILY
Qty: 90 TABLET | Refills: 0 | Status: CANCELLED | OUTPATIENT
Start: 2018-08-21

## 2018-08-21 RX ORDER — LOSARTAN POTASSIUM AND HYDROCHLOROTHIAZIDE 12.5; 5 MG/1; MG/1
1 TABLET ORAL DAILY
Qty: 90 TABLET | Refills: 2 | Status: SHIPPED | OUTPATIENT
Start: 2018-08-21 | End: 2019-03-05 | Stop reason: DRUGHIGH

## 2018-08-21 RX ORDER — AMOXICILLIN 500 MG/1
CAPSULE ORAL
Refills: 0 | COMMUNITY
Start: 2018-08-02 | End: 2018-08-21

## 2018-08-21 RX ORDER — PRAVASTATIN SODIUM 40 MG
40 TABLET ORAL NIGHTLY
Qty: 90 TABLET | Refills: 2 | Status: SHIPPED | OUTPATIENT
Start: 2018-08-21 | End: 2019-03-05 | Stop reason: SDUPTHER

## 2018-08-21 NOTE — PROGRESS NOTES
Marcelle Johnson is a 85 y.o. female.      Assessment/Plan   Problem List Items Addressed This Visit        Cardiovascular and Mediastinum    Hyperlipidemia - Primary    Relevant Medications    pravastatin (PRAVACHOL) 40 MG tablet    Other Relevant Orders    Lipid Panel    Hypertension    Relevant Medications    losartan-hydrochlorothiazide (HYZAAR) 50-12.5 MG per tablet    Other Relevant Orders    Comprehensive Metabolic Panel       Endocrine    Diabetes mellitus (CMS/HCC)    Relevant Orders    Hemoglobin A1c    Microalbumin / Creatinine Urine Ratio - Urine, Clean Catch    Hypothyroidism    Relevant Orders    TSH           Follow-up results of blood work for ongoing management of chronic medical problems recommend exercises for her feet stretching before bedtime follow-up if no improvement otherwise has needed or in 6 months      Chief Complaint   Patient presents with   • Follow-up     hypertension on valsartan-hct   • Follow-up     cholesterol   • Follow-up     diabetes   • Foot Problem     left toes stay cold at night, cant sleep    • Follow-up     thyroid     Social History   Substance Use Topics   • Smoking status: Former Smoker     Years: 5.00     Types: Cigarettes     Quit date: 1966   • Smokeless tobacco: Never Used   • Alcohol use No       History of Present Illness   Patient comes in for follow-up of chronic problems of hypertension hyperlipidemia diabetes and hypothyroidism she has no acute concerns doing fairly stable she's medication refills and laboratory monitoring she has developed some new problem of left foot is cold at night it does have her some sleep disturbance is been going on for the last few weeks no specific injury or trauma she is not very active although she does get around without any assistive devices  The following portions of the patient's history were reviewed and updated as appropriate:PMHroutine: Social history , Past Medical History, Allergies, Current Medications, Active Problem  "List, Family History and Health Maintenance    Review of Systems   Constitutional: Negative.    HENT: Negative.    Eyes: Negative.    Respiratory: Negative.    Cardiovascular: Negative.    Gastrointestinal: Negative.    Genitourinary: Negative.    Musculoskeletal: Negative.    Neurological: Negative.    Hematological: Negative.        Objective   Vitals:    08/21/18 1119   BP: 131/70   BP Location: Right arm   Patient Position: Sitting   Cuff Size: Large Adult   Pulse: 80   Resp: 16   SpO2: 98%   Weight: 75.4 kg (166 lb 3.2 oz)   Height: 160 cm (62.99\")     Body mass index is 29.45 kg/m².  Physical Exam   Constitutional: She is oriented to person, place, and time. She appears well-developed and well-nourished. No distress.   HENT:   Head: Normocephalic and atraumatic.   Eyes: Pupils are equal, round, and reactive to light. Conjunctivae and EOM are normal. Right eye exhibits no discharge. Left eye exhibits no discharge. No scleral icterus.   Neck: Normal range of motion. Neck supple. No tracheal deviation present. No thyromegaly present.   Cardiovascular: Normal rate, regular rhythm, normal heart sounds, intact distal pulses and normal pulses.  Exam reveals no gallop.    No murmur heard.  Pulmonary/Chest: Effort normal and breath sounds normal. No respiratory distress. She has no wheezes. She has no rales.   Musculoskeletal: Normal range of motion.   Feet bilaterally good pulses distally, sensory deficits rashes or calluses   Neurological: She is alert and oriented to person, place, and time. She exhibits normal muscle tone. Coordination normal.   Skin: Skin is warm. No rash noted. No erythema. No pallor.   Psychiatric: She has a normal mood and affect. Her behavior is normal. Judgment and thought content normal.   Nursing note and vitals reviewed.    Reviewed Data:  No visits with results within 1 Month(s) from this visit.   Latest known visit with results is:   Admission on 06/06/2018, Discharged on 06/06/2018 "   Component Date Value Ref Range Status   • Color 06/06/2018 Yellow  Yellow, Straw, Dark Yellow, Pascale Final   • Clarity, UA 06/06/2018 Slightly Cloudy* Clear Final   • Glucose, UA 06/06/2018 Negative  Negative, 1000 mg/dL (3+) mg/dL Final   • Bilirubin 06/06/2018 Negative  Negative Final   • Ketones, UA 06/06/2018 Negative  Negative Final   • Specific Gravity  06/06/2018 1.020  1.005 - 1.030 Final   • Blood, UA 06/06/2018 Large* Negative Final   • pH, Urine 06/06/2018 6.0  5.0 - 8.0 Final   • Protein, POC 06/06/2018 30 mg/dL* Negative mg/dL Final   • Urobilinogen, UA 06/06/2018 Normal  Normal Final   • Leukocytes 06/06/2018 Moderate (2+)* Negative Final   • Nitrite, UA 06/06/2018 Negative  Negative Final   • Urine Culture 06/06/2018 Final report*  Final   • Result 1 06/06/2018 Klebsiella pneumoniae*  Final    50,000-100,000 colony forming units per mL   • Susceptibility Testing 06/06/2018 Comment   Final          ** S = Susceptible; I = Intermediate; R = Resistant **                     P = Positive; N = Negative              MICS are expressed in micrograms per mL     Antibiotic                 RSLT#1    RSLT#2    RSLT#3    RSLT#4  Amoxicillin/Clavulanic Acid    S  Ampicillin                     R  Cefepime                       S  Ceftriaxone                    S  Cefuroxime                     S  Cephalothin                    S  Ciprofloxacin                  S  Ertapenem                      S  Gentamicin                     S  Imipenem                       S  Levofloxacin                   S  Nitrofurantoin                 I  Piperacillin                   S  Tetracycline                   S  Tobramycin                     S  Trimethoprim/Sulfa             S

## 2018-08-22 LAB
ALBUMIN SERPL-MCNC: 3.8 G/DL (ref 3.5–4.7)
ALBUMIN/CREAT UR: 36.3 MG/G CREAT (ref 0–30)
ALBUMIN/GLOB SERPL: 1.7 {RATIO} (ref 1.2–2.2)
ALP SERPL-CCNC: 41 IU/L (ref 39–117)
ALT SERPL-CCNC: 21 IU/L (ref 0–32)
AST SERPL-CCNC: 26 IU/L (ref 0–40)
BILIRUB SERPL-MCNC: 0.3 MG/DL (ref 0–1.2)
BUN SERPL-MCNC: 11 MG/DL (ref 8–27)
BUN/CREAT SERPL: 14 (ref 12–28)
CALCIUM SERPL-MCNC: 8.8 MG/DL (ref 8.7–10.3)
CHLORIDE SERPL-SCNC: 97 MMOL/L (ref 96–106)
CHOLEST SERPL-MCNC: 120 MG/DL (ref 100–199)
CO2 SERPL-SCNC: 22 MMOL/L (ref 20–29)
CREAT SERPL-MCNC: 0.81 MG/DL (ref 0.57–1)
CREAT UR-MCNC: 164.8 MG/DL
GLOBULIN SER CALC-MCNC: 2.3 G/DL (ref 1.5–4.5)
GLUCOSE SERPL-MCNC: 122 MG/DL (ref 65–99)
HBA1C MFR BLD: 6.6 % (ref 4.8–5.6)
HDLC SERPL-MCNC: 45 MG/DL
LDLC SERPL CALC-MCNC: 58 MG/DL (ref 0–99)
MICROALBUMIN UR-MCNC: 59.8 UG/ML
POTASSIUM SERPL-SCNC: 4.1 MMOL/L (ref 3.5–5.2)
PROT SERPL-MCNC: 6.1 G/DL (ref 6–8.5)
SODIUM SERPL-SCNC: 135 MMOL/L (ref 134–144)
TRIGL SERPL-MCNC: 86 MG/DL (ref 0–149)
TSH SERPL DL<=0.005 MIU/L-ACNC: 1.66 UIU/ML (ref 0.45–4.5)
VLDLC SERPL CALC-MCNC: 17 MG/DL (ref 5–40)

## 2018-08-23 ENCOUNTER — TELEPHONE (OUTPATIENT)
Dept: INTERNAL MEDICINE | Facility: CLINIC | Age: 83
End: 2018-08-23

## 2018-10-19 ENCOUNTER — HOSPITAL ENCOUNTER (OUTPATIENT)
Dept: BONE DENSITY | Facility: HOSPITAL | Age: 83
Discharge: HOME | End: 2018-10-19
Attending: INTERNAL MEDICINE | Admitting: INTERNAL MEDICINE

## 2018-10-19 DIAGNOSIS — M81.0 OSTEOPOROSIS, UNSPECIFIED OSTEOPOROSIS TYPE, UNSPECIFIED PATHOLOGICAL FRACTURE PRESENCE: ICD-10-CM

## 2018-10-19 DIAGNOSIS — C50.919 MALIGNANT NEOPLASM OF BREAST IN FEMALE, ESTROGEN RECEPTOR POSITIVE, UNSPECIFIED LATERALITY, UNSPECIFIED SITE OF BREAST (HCC): ICD-10-CM

## 2018-10-19 DIAGNOSIS — Z17.0 MALIGNANT NEOPLASM OF BREAST IN FEMALE, ESTROGEN RECEPTOR POSITIVE, UNSPECIFIED LATERALITY, UNSPECIFIED SITE OF BREAST (HCC): ICD-10-CM

## 2018-10-19 PROCEDURE — 77080 DXA BONE DENSITY AXIAL: CPT

## 2018-10-23 RX ORDER — VALSARTAN AND HYDROCHLOROTHIAZIDE 160; 12.5 MG/1; MG/1
TABLET, FILM COATED ORAL
Qty: 90 TABLET | Refills: 0 | OUTPATIENT
Start: 2018-10-23

## 2018-10-23 RX ORDER — LEVOTHYROXINE SODIUM 0.1 MG/1
TABLET ORAL
Qty: 90 TABLET | Refills: 0 | Status: SHIPPED | OUTPATIENT
Start: 2018-10-23 | End: 2018-12-28

## 2018-10-24 ENCOUNTER — APPOINTMENT (OUTPATIENT)
Dept: OTHER | Facility: HOSPITAL | Age: 83
End: 2018-10-24

## 2018-10-24 ENCOUNTER — OFFICE VISIT (OUTPATIENT)
Dept: ONCOLOGY | Facility: CLINIC | Age: 83
End: 2018-10-24

## 2018-10-24 ENCOUNTER — LAB (OUTPATIENT)
Dept: OTHER | Facility: HOSPITAL | Age: 83
End: 2018-10-24

## 2018-10-24 ENCOUNTER — APPOINTMENT (OUTPATIENT)
Dept: ONCOLOGY | Facility: HOSPITAL | Age: 83
End: 2018-10-24

## 2018-10-24 VITALS
HEART RATE: 83 BPM | HEIGHT: 63 IN | TEMPERATURE: 97.2 F | BODY MASS INDEX: 29.86 KG/M2 | WEIGHT: 168.5 LBS | OXYGEN SATURATION: 97 % | DIASTOLIC BLOOD PRESSURE: 73 MMHG | RESPIRATION RATE: 16 BRPM | SYSTOLIC BLOOD PRESSURE: 172 MMHG

## 2018-10-24 DIAGNOSIS — C50.411 MALIGNANT NEOPLASM OF UPPER-OUTER QUADRANT OF RIGHT FEMALE BREAST, UNSPECIFIED ESTROGEN RECEPTOR STATUS (HCC): ICD-10-CM

## 2018-10-24 DIAGNOSIS — M81.0 OSTEOPOROSIS, UNSPECIFIED OSTEOPOROSIS TYPE, UNSPECIFIED PATHOLOGICAL FRACTURE PRESENCE: Primary | ICD-10-CM

## 2018-10-24 DIAGNOSIS — Z12.39 SCREENING BREAST EXAMINATION: ICD-10-CM

## 2018-10-24 DIAGNOSIS — Z17.0 MALIGNANT NEOPLASM OF BREAST IN FEMALE, ESTROGEN RECEPTOR POSITIVE, UNSPECIFIED LATERALITY, UNSPECIFIED SITE OF BREAST (HCC): ICD-10-CM

## 2018-10-24 DIAGNOSIS — C50.919 MALIGNANT NEOPLASM OF BREAST IN FEMALE, ESTROGEN RECEPTOR POSITIVE, UNSPECIFIED LATERALITY, UNSPECIFIED SITE OF BREAST (HCC): ICD-10-CM

## 2018-10-24 LAB
ALBUMIN SERPL-MCNC: 3.6 G/DL (ref 3.5–5.2)
ALBUMIN/GLOB SERPL: 1.3 G/DL
ALP SERPL-CCNC: 46 U/L (ref 39–117)
ALT SERPL W P-5'-P-CCNC: 19 U/L (ref 1–33)
ANION GAP SERPL CALCULATED.3IONS-SCNC: 11.6 MMOL/L
AST SERPL-CCNC: 24 U/L (ref 1–32)
BILIRUB SERPL-MCNC: 0.3 MG/DL (ref 0.1–1.2)
BUN BLD-MCNC: 11 MG/DL (ref 8–23)
BUN/CREAT SERPL: 13.6 (ref 7–25)
CALCIUM SPEC-SCNC: 8.9 MG/DL (ref 8.6–10.5)
CHLORIDE SERPL-SCNC: 97 MMOL/L (ref 98–107)
CO2 SERPL-SCNC: 24.4 MMOL/L (ref 22–29)
CREAT BLD-MCNC: 0.81 MG/DL (ref 0.57–1)
DEPRECATED RDW RBC AUTO: 41 FL (ref 37–54)
ERYTHROCYTE [DISTWIDTH] IN BLOOD BY AUTOMATED COUNT: 12.9 % (ref 11.7–13)
GFR SERPL CREATININE-BSD FRML MDRD: 67 ML/MIN/1.73
GLOBULIN UR ELPH-MCNC: 2.8 GM/DL
GLUCOSE BLD-MCNC: 200 MG/DL (ref 65–99)
HCT VFR BLD AUTO: 36.6 % (ref 35.6–45.5)
HGB BLD-MCNC: 12.3 G/DL (ref 11.9–15.5)
MCH RBC QN AUTO: 29.4 PG (ref 26.9–32)
MCHC RBC AUTO-ENTMCNC: 33.6 G/DL (ref 32.4–36.3)
MCV RBC AUTO: 87.6 FL (ref 80.5–98.2)
PLATELET # BLD AUTO: 167 10*3/MM3 (ref 140–500)
PMV BLD AUTO: 10.3 FL (ref 6–12)
POTASSIUM BLD-SCNC: 4.1 MMOL/L (ref 3.5–5.2)
PROT SERPL-MCNC: 6.4 G/DL (ref 6–8.5)
RBC # BLD AUTO: 4.18 10*6/MM3 (ref 3.9–5.2)
SODIUM BLD-SCNC: 133 MMOL/L (ref 136–145)
WBC NRBC COR # BLD: 9.27 10*3/MM3 (ref 4.5–10.7)

## 2018-10-24 PROCEDURE — 36415 COLL VENOUS BLD VENIPUNCTURE: CPT

## 2018-10-24 PROCEDURE — 99214 OFFICE O/P EST MOD 30 MIN: CPT | Performed by: INTERNAL MEDICINE

## 2018-10-24 PROCEDURE — 85027 COMPLETE CBC AUTOMATED: CPT | Performed by: INTERNAL MEDICINE

## 2018-10-24 PROCEDURE — 80053 COMPREHEN METABOLIC PANEL: CPT | Performed by: INTERNAL MEDICINE

## 2018-10-24 NOTE — PROGRESS NOTES
Subjective .     REASONS FOR FOLLOWUP: 1.  [id number] invasive ductal carcinoma of the right breast, estrogen receptor positive greater than 90%, progesterone receptor 90%, HER-2/breanne 2+ on immunohistochemistry and negative by fish  2.  Osteoporosis  3. arimidex held secondary to arthralgias.  4. ? Side effect to prolia, patient complained of jaw pain secondary to Prolia and refused any bisphosphonates  5.  Arimidex discontinued with resolution of arthralgias.  6.  Patient started on tamoxifen as of July 6, 2017.    HISTORY OF PRESENT ILLNESS:  The patient is a 85 y.o. year old female who is here for follow-up with the above-mentioned history.    History of Present Illness   patient is a 83-year-old female with a above  history currently here for follow-up.  She is here to start Arimidex for her T1 cN0 M0 invasive ductal carcinoma of the right breast.  She had a bone density testing done which does show osteoporosis.  Patient had been on Arimidex andprolia for osteoporosis.  She could not tolerate Arimidex and hence placed on tamoxifen.  She is tolerating tamoxifen very well.     Patient's mammogram October 14 was and 17 had been negative.  Patient states that she did not have mammograms.  This year.  Will get it scheduled.    Past Medical History:   Diagnosis Date   • Breast cancer of upper-outer quadrant of right female breast (CMS/HCC) 10/13/2016   • Colon cancer (CMS/HCC)     had colon resection   • Diabetes (CMS/HCC)     borderline   • Diverticulosis    • Esophageal stricture     had it stretched via scope   • High cholesterol    • Hypertension    • Hypothyroid    • Pneumonia        ONCOLOGIC HISTORY:  (History from previous dates can be found in the separate document.)  patient is a 83-year-old female who states that she noticed a lump in the upper outer quadrant of the right breast over the last 2 months. She thinks it was the size of a dime. She then underwent a mammogram on 8/3/2016 which showed a 1 cm mass  in the middle third upper outer quadrant of the right breast. Additionly  there was a small asymmetry in the left breast. Spot compression showed partial resolution of this area in the left breast. This measures 0.8 cm. No evidence of axillary adenopathy seen.     Ultrasound was done which shows at 10 o'clock position in the right breast there is a 0.9 cm irregular heterogeneous mass. But no abnormality is seen in the left breast. A six-month follow-up on the left breast is suggested.     Patient then underwent ultrasound-guided biopsy on 8/17/2016. Allergies shows invasive mammary carcinoma ductal type with focal lobular features in the right breast, Nadira score of 5 out of 9. There is no angiolymphatic invasion seen. Maximum dimension of invasive carcinoma in 7 mm. No carcinoma in situ identified. Estrogen receptors were greater than 90%, progesterone receptor is 90%, HER-2/breanne was 2+ on immunohistochemistry and negative by fish.     MRI of the breast shows middle third upper quadrant of the right breast at 10 o'clock position the biopsy cavity measured 1.1 cm from superior to inferior dimension 1.3 cm in anterior to posterior dimension and 2.1 cm from medial to lateral dimension. There were no other areas of enhancement in the left breast.  Chest x-ray no acute infiltrate.     Patient underwent lumpectomy on the right side with sentinel lymph node biopsy by Dr. Lee on 9/19/2016. Patient had invasive carcinoma 1.2 cm with lobular features. The overall grade is grade 2 with her Nadira score of 6 out of 9. He had focal ductal carcinoma in situ and focal atypical lobular hyperplasia as well. The ductal carcinoma in situ was less than 1 mm, cribriform pattern and low nuclear grade grade 1. The margins were indeterminate for invasive carcinoma but uninvolved by DCIS. Patient has had reexcised margins and invasive carcinoma is present only in the reexcised anterior margin. The tumor focally extends to  within 1 mm of the reexcised anterior margin. The fibroinflammatory biopsy skin changes extend to the anterior margin. I will need to discuss with the pathologist about the margins. She has extends to within 3 mm was to margins and to within 5 mm in the inferior margin. The number of sentinel lymph nodes examined is to and there is no evidence of my or macro metastasis and no isolated tumor cells.     Patient now has been referred to radiation oncology and us for further options of treatment.  Given her age, her low stage, her strong estrogen and progesterone receptor positivity Dr. Nolasco felt that she does not need to undergo radiation.    Patient's bone density done shows osteoporosis.  Discussion done about consideration of tamoxifen versus Arimidex with prolia.  Patient refuses tamoxifen and is willing to consider Arimidex.  We did discuss that it'll be important for her to take prolia , calcium and vitamin D.  Pt could not tolerate prolia .    Pt on arimidex  and likely change to tamoxifen. Due to side effects    Arimidex discontinued secondary to severe arthralgias.  Tamoxifen started as of 2017.    .  Past medical history: Patient was diagnosed with the colon cancer back in  and at the same time she had her ovaries removed and appendix and gallbladder removed. She didn't require any chemotherapy at the time.  Patient had pneumonia back in .  Her hypertension  High cholesterol  Hypothyroidism  Patient has had colonoscopy 5 years ago by Dr. Tanner and apparently had a polyp she was to return to him for another colonoscopy in 5 years.  Patient had esophageal stricture as a result she underwent dilation by Dr. Hopson last year.  She underwent parathyroidectomy.  She has borderline diabetes. On diet control     OB/GYN history she started metastases. At age 12, had menopause at age 50 when she underwent oophorectomy. She is  2 para 2 no miscarriages she has 2 children one is 54 and  second one is 51-year-old and one grandchild.       Current Outpatient Prescriptions on File Prior to Visit   Medication Sig Dispense Refill   • aspirin 81 MG tablet Take 81 mg by mouth daily.     • Calcium Citrate-Vitamin D (CALCIUM + D PO) Take 1,000 mg by mouth daily.     • Esomeprazole Magnesium (NEXIUM PO) Take 22.3 mg by mouth every morning.     • FIBER PO Take 2 capsules by mouth daily.     • levothyroxine (SYNTHROID, LEVOTHROID) 100 MCG tablet TAKE 1 TABLET BY MOUTH DAILY. 90 tablet 0   • losartan-hydrochlorothiazide (HYZAAR) 50-12.5 MG per tablet Take 1 tablet by mouth Daily. 90 tablet 2   • pravastatin (PRAVACHOL) 40 MG tablet Take 1 tablet by mouth Every Night. 90 tablet 2   • tamoxifen (NOLVADEX) 20 MG chemo tablet TAKE 1 TABLET BY MOUTH EVERY DAY 90 tablet 2   • vitamin C (ASCORBIC ACID) 500 MG tablet Take 500 mg by mouth Daily As Needed.     • sulfamethoxazole-trimethoprim (BACTRIM DS,SEPTRA DS) 800-160 MG per tablet Take 1 tablet by mouth 2 (Two) Times a Day. 20 tablet 0     No current facility-administered medications on file prior to visit.        ALLERGIES:     Allergies   Allergen Reactions   • Morphine And Related Nausea And Vomiting       Social History     Social History   • Marital status:      Spouse name: N/A   • Number of children: 2   • Years of education: High school     Occupational History   •  Retired     Social History Main Topics   • Smoking status: Former Smoker     Years: 5.00     Types: Cigarettes     Quit date: 1966   • Smokeless tobacco: Never Used   • Alcohol use No   • Drug use: No   • Sexual activity: Defer     Other Topics Concern   • Not on file     Social History Narrative   • No narrative on file         Cancer-related family history is negative for Breast cancer, Colon cancer, Endometrial cancer, and Ovarian cancer.     Review of Systems   Constitutional: Negative for appetite change, chills, diaphoresis, fatigue, fever and unexpected weight change.   HENT:  "Negative for hearing loss, sore throat and trouble swallowing.    Respiratory: Negative for cough, chest tightness, shortness of breath and wheezing.    Cardiovascular: Negative for chest pain, palpitations and leg swelling.   Gastrointestinal: Negative for abdominal distention, abdominal pain, constipation, diarrhea, nausea and vomiting.   Genitourinary: Negative for dysuria, frequency, hematuria and urgency.   Musculoskeletal: Negative for joint swelling.        No muscle weakness.   Skin: Negative for rash and wound.   Neurological: Negative for seizures, syncope, speech difficulty, weakness, numbness and headaches.   Hematological: Negative for adenopathy. Does not bruise/bleed easily.   Psychiatric/Behavioral: Negative for behavioral problems, confusion and suicidal ideas.     A comprehensive 14 point review of systems was performed and was negative except as mentioned.    Objective      Vitals:    10/24/18 1118   BP: 172/73   Pulse: 83   Resp: 16   Temp: 97.2 °F (36.2 °C)   TempSrc: Oral   SpO2: 97%   Weight: 76.4 kg (168 lb 8 oz)   Height: 160 cm (62.99\")   PainSc: 0-No pain     Current Status 10/24/2018   ECOG score 0       Physical Exam      GENERAL:  Well-developed, well-nourished in no acute distress.   SKIN:  Warm, dry without rashes, purpura or petechiae.  EYES:  Pupils equal, round and reactive to light.  EOMs intact.  Conjunctivae normal.  EARS:  Hearing intact.  NOSE:  Septum midline.  No excoriations or nasal discharge.  MOUTH:  Tongue is well-papillated; no stomatitis or ulcers.  Lips normal.  THROAT:  Oropharynx without lesions or exudates.  NECK:  Supple with good range of motion; no thyromegaly or masses, no JVD.  LYMPHATICS:  No cervical, supraclavicular, axillary or inguinal adenopathy.  CHEST:  Lungs clear to auscultation. Good airflow.  No evidence of any bilateral breast masses, no nipple discharge bilaterally, no evidence of bilateral axillary adenopathy, no evidence of bilateral " supraclavicular adenopathy:  CARDIAC:  Regular rate and rhythm without murmurs, rubs or gallops. Normal S1,S2.  ABDOMEN:  Soft, nontender with no hepatosplenomegaly or masses.  EXTREMITIES:  No clubbing, cyanosis or edema.  NEUROLOGICAL:  Cranial Nerves II-XII grossly intact.  No focal neurological deficits.  PSYCHIATRIC:  Normal affect and mood.    RECENT LABS:  Hematology WBC   Date Value Ref Range Status   04/26/2018 8.56 4.50 - 10.70 10*3/mm3 Final     RBC   Date Value Ref Range Status   04/26/2018 4.23 3.90 - 5.20 10*6/mm3 Final   02/25/2016 4.63  Final     Hemoglobin   Date Value Ref Range Status   04/26/2018 12.5 11.9 - 15.5 g/dL Final     Hematocrit   Date Value Ref Range Status   04/26/2018 36.9 35.6 - 45.5 % Final     Platelets   Date Value Ref Range Status   04/26/2018 183 140 - 500 10*3/mm3 Final        Assessment/Plan        1. T1 cN0 M0 invasive ductal carcinoma of the right breast, with lobular features, ER positive at 90%, NV positive at 90%, HER-2/breanne 2+ by minimal histochemistry and negative by fish with a HER-2/CEP ratio of 1.1.  Patient was started on Arimidex 1 mg daily but due to severe arthralgias was discontinued.  After discontinuation arthralgias improved.  Patient was started on tamoxifen and tolerated it very well.      2. Osteoporosis:  She is on  calcium 1200 mg once daily, vitamin D 1000 international units daily as well.  Patient refuses Prolia because of side effect of jaw pain after receiving Prolia.     She is now willing to consider trying Prolia again.  If she has recurrence of jaw pain then we may need to discontinue Prolia.    Plan 1.  Continue tamoxifen    2.  Will give Prolia in 6 weeks with appointment at with nurse practitioner as she has had root canals none last week.    3.  Obtain screening mammogram mammogram in one week    4.  We will see her back in 7 months with labs        Emili Galicia MD     Cc: Dr. James Lee

## 2018-10-31 ENCOUNTER — HOSPITAL ENCOUNTER (OUTPATIENT)
Dept: MAMMOGRAPHY | Facility: HOSPITAL | Age: 83
Discharge: HOME | End: 2018-10-31
Attending: INTERNAL MEDICINE | Admitting: INTERNAL MEDICINE

## 2018-10-31 DIAGNOSIS — Z12.39 SCREENING BREAST EXAMINATION: ICD-10-CM

## 2018-10-31 PROCEDURE — 77067 SCR MAMMO BI INCL CAD: CPT

## 2018-11-01 ENCOUNTER — TELEPHONE (OUTPATIENT)
Dept: INTERNAL MEDICINE | Facility: CLINIC | Age: 83
End: 2018-11-01

## 2018-11-01 DIAGNOSIS — K92.9 DIGESTIVE PROBLEMS: Primary | ICD-10-CM

## 2018-11-29 ENCOUNTER — OFFICE VISIT (OUTPATIENT)
Dept: GASTROENTEROLOGY | Facility: CLINIC | Age: 83
End: 2018-11-29

## 2018-11-29 VITALS
WEIGHT: 150.4 LBS | BODY MASS INDEX: 25.68 KG/M2 | SYSTOLIC BLOOD PRESSURE: 134 MMHG | TEMPERATURE: 97.7 F | DIASTOLIC BLOOD PRESSURE: 82 MMHG | HEIGHT: 64 IN

## 2018-11-29 DIAGNOSIS — K22.2 ESOPHAGEAL STRICTURE: ICD-10-CM

## 2018-11-29 DIAGNOSIS — C18.9 MALIGNANT NEOPLASM OF COLON, UNSPECIFIED PART OF COLON (HCC): ICD-10-CM

## 2018-11-29 DIAGNOSIS — D12.6 ADENOMATOUS POLYP OF COLON, UNSPECIFIED PART OF COLON: ICD-10-CM

## 2018-11-29 DIAGNOSIS — R13.10 DYSPHAGIA, UNSPECIFIED TYPE: Primary | ICD-10-CM

## 2018-11-29 PROCEDURE — 99202 OFFICE O/P NEW SF 15 MIN: CPT | Performed by: INTERNAL MEDICINE

## 2018-11-29 NOTE — PROGRESS NOTES
Chief Complaint   Patient presents with   • Pre-op Exam     Colonoscopy    • Difficulty Swallowing        Marcelle Johnson is a  85 y.o. female here for a follow up visit for dysphagia with a history of esophageal stricture, personal history of colon cancer and adenomatous polyps    HPI this 85-year-old white female patient of Dr. Jorge Luis Weinberg and Dr. Maria M Galicia returns since last evaluated in November 2015.  She carries a history of an esophageal stricture in the proximal esophagus.  An attempt was made at esophageal dilation in June of that year that had to be aborted because of hypoxia.  A follow-up examination in November 2015 did allow for proximal esophageal dilation to a level of 15 mm.  She presents now with issues of swallowing difficulty especially with some pills and with certain solids especially breads and meats.  She also has a history of colon cancer as well as a personal history of colon polyps.  Last colonoscopy of record by Dr. Tanner was in October 2011.  She expressed interest in having her colon evaluated again.  We discussed the potential risk involved with either endoscopy given her history.  I would consider performing upper and lower endoscopy in the OR setting with airway control so that these evaluations can be performed safely.  She is amenable to this.    Past Medical History:   Diagnosis Date   • Breast cancer of upper-outer quadrant of right female breast (CMS/HCC) 10/13/2016   • Colon cancer (CMS/HCC)     had colon resection   • Diabetes (CMS/HCC)     borderline   • Diverticulosis    • Esophageal stricture     had it stretched via scope   • High cholesterol    • Hypertension    • Hypothyroid    • Pneumonia        Current Outpatient Medications   Medication Sig Dispense Refill   • aspirin 81 MG tablet Take 81 mg by mouth daily.     • Calcium Citrate-Vitamin D (CALCIUM + D PO) Take 1,000 mg by mouth daily.     • Esomeprazole Magnesium (NEXIUM PO) Take 22.3 mg by mouth every morning.      • FIBER PO Take 2 capsules by mouth daily.     • levothyroxine (SYNTHROID, LEVOTHROID) 100 MCG tablet TAKE 1 TABLET BY MOUTH DAILY. 90 tablet 0   • losartan-hydrochlorothiazide (HYZAAR) 50-12.5 MG per tablet Take 1 tablet by mouth Daily. 90 tablet 2   • pravastatin (PRAVACHOL) 40 MG tablet Take 1 tablet by mouth Every Night. 90 tablet 2   • tamoxifen (NOLVADEX) 20 MG chemo tablet TAKE 1 TABLET BY MOUTH EVERY DAY 90 tablet 2   • vitamin C (ASCORBIC ACID) 500 MG tablet Take 500 mg by mouth Daily As Needed.       No current facility-administered medications for this visit.        PRN Meds:.    Allergies   Allergen Reactions   • Morphine And Related Nausea And Vomiting       Social History     Socioeconomic History   • Marital status:      Spouse name: Not on file   • Number of children: 2   • Years of education: High school   • Highest education level: Not on file   Social Needs   • Financial resource strain: Not on file   • Food insecurity - worry: Not on file   • Food insecurity - inability: Not on file   • Transportation needs - medical: Not on file   • Transportation needs - non-medical: Not on file   Occupational History     Employer: RETIRED   Tobacco Use   • Smoking status: Former Smoker     Years: 5.00     Types: Cigarettes     Last attempt to quit: 1966     Years since quittin.9   • Smokeless tobacco: Never Used   Substance and Sexual Activity   • Alcohol use: No   • Drug use: No   • Sexual activity: Defer   Other Topics Concern   • Not on file   Social History Narrative   • Not on file       Family History   Problem Relation Age of Onset   • Diabetes Mother    • No Known Problems Father    • No Known Problems Sister    • No Known Problems Brother    • No Known Problems Daughter    • No Known Problems Son    • No Known Problems Maternal Grandmother    • No Known Problems Paternal Grandmother    • No Known Problems Maternal Aunt    • No Known Problems Paternal Aunt    • BRCA 1/2 Neg Hx    •  Breast cancer Neg Hx    • Colon cancer Neg Hx    • Endometrial cancer Neg Hx    • Ovarian cancer Neg Hx        Review of Systems   Constitutional: Negative for activity change, appetite change, fatigue and unexpected weight change.   HENT: Negative for congestion, facial swelling, sore throat, trouble swallowing and voice change.    Eyes: Negative for photophobia and visual disturbance.   Respiratory: Negative for cough and choking.    Cardiovascular: Negative for chest pain.   Gastrointestinal: Positive for constipation. Negative for abdominal distention, abdominal pain, anal bleeding, blood in stool, diarrhea, nausea, rectal pain and vomiting.        Dysphagia   Endocrine: Negative for polyphagia.   Musculoskeletal: Negative for arthralgias, gait problem and joint swelling.   Skin: Negative for color change, pallor and rash.   Allergic/Immunologic: Negative for food allergies.   Neurological: Negative for speech difficulty and headaches.   Hematological: Does not bruise/bleed easily.   Psychiatric/Behavioral: Negative for agitation, confusion and sleep disturbance.       Vitals:    11/29/18 1430   BP: 134/82   Temp: 97.7 °F (36.5 °C)       Physical Exam   Constitutional: She is oriented to person, place, and time. She appears well-developed and well-nourished.   HENT:   Head: Normocephalic.   Mouth/Throat: Oropharynx is clear and moist.   Eyes: Conjunctivae and EOM are normal.   Neck: Normal range of motion.   Cardiovascular: Normal rate and regular rhythm.   Pulmonary/Chest: Breath sounds normal.   Abdominal: Soft. Bowel sounds are normal.   Musculoskeletal: Normal range of motion.   Neurological: She is alert and oriented to person, place, and time.   Skin: Skin is warm and dry.   Psychiatric: She has a normal mood and affect. Her behavior is normal.       ASSESSMENT   #1 dysphagia  #2 history of proximal esophageal stricture  #3 history of adenomatous polyps  #4 history of colon cancer    PLAN  Offer EGD and  colonoscopy in the operating room with airway control.      ICD-10-CM ICD-9-CM   1. Dysphagia, unspecified type R13.10 787.20   2. Esophageal stricture K22.2 530.3   3. Adenomatous polyp of colon, unspecified part of colon D12.6 211.3   4. Malignant neoplasm of colon, unspecified part of colon (CMS/HCC) C18.9 153.9

## 2018-12-04 DIAGNOSIS — C50.411 MALIGNANT NEOPLASM OF UPPER-OUTER QUADRANT OF RIGHT FEMALE BREAST, UNSPECIFIED ESTROGEN RECEPTOR STATUS (HCC): ICD-10-CM

## 2018-12-04 DIAGNOSIS — M81.0 OSTEOPOROSIS, UNSPECIFIED OSTEOPOROSIS TYPE, UNSPECIFIED PATHOLOGICAL FRACTURE PRESENCE: Primary | ICD-10-CM

## 2018-12-05 ENCOUNTER — OFFICE VISIT (OUTPATIENT)
Dept: ONCOLOGY | Facility: CLINIC | Age: 83
End: 2018-12-05

## 2018-12-05 ENCOUNTER — APPOINTMENT (OUTPATIENT)
Dept: ONCOLOGY | Facility: HOSPITAL | Age: 83
End: 2018-12-05

## 2018-12-05 ENCOUNTER — APPOINTMENT (OUTPATIENT)
Dept: OTHER | Facility: HOSPITAL | Age: 83
End: 2018-12-05

## 2018-12-05 VITALS
HEIGHT: 64 IN | HEART RATE: 80 BPM | SYSTOLIC BLOOD PRESSURE: 151 MMHG | RESPIRATION RATE: 18 BRPM | WEIGHT: 166.3 LBS | TEMPERATURE: 97.6 F | DIASTOLIC BLOOD PRESSURE: 75 MMHG | BODY MASS INDEX: 28.39 KG/M2 | OXYGEN SATURATION: 97 %

## 2018-12-05 DIAGNOSIS — M80.00XD OSTEOPOROSIS WITH CURRENT PATHOLOGICAL FRACTURE WITH ROUTINE HEALING, UNSPECIFIED OSTEOPOROSIS TYPE, SUBSEQUENT ENCOUNTER: Primary | ICD-10-CM

## 2018-12-05 DIAGNOSIS — C50.411 MALIGNANT NEOPLASM OF UPPER-OUTER QUADRANT OF RIGHT FEMALE BREAST, UNSPECIFIED ESTROGEN RECEPTOR STATUS (HCC): ICD-10-CM

## 2018-12-05 PROCEDURE — 99214 OFFICE O/P EST MOD 30 MIN: CPT | Performed by: NURSE PRACTITIONER

## 2018-12-05 PROCEDURE — G0463 HOSPITAL OUTPT CLINIC VISIT: HCPCS | Performed by: NURSE PRACTITIONER

## 2018-12-05 NOTE — PROGRESS NOTES
"  Subjective .     REASONS FOR FOLLOWUP: 1.  [id number] invasive ductal carcinoma of the right breast, estrogen receptor positive greater than 90%, progesterone receptor 90%, HER-2/breanne 2+ on immunohistochemistry and negative by fish  2.  Osteoporosis  3. arimidex held secondary to arthralgias.  4. ? Side effect to prolia, patient complained of jaw pain secondary to Prolia and refused any bisphosphonates  5.  Arimidex discontinued with resolution of arthralgias.  6.  Patient started on tamoxifen as of July 6, 2017.    HISTORY OF PRESENT ILLNESS:  The patient is a 85 y.o. year old female who is here for follow-up with the above-mentioned history.    History of Present Illness   patient is a 85-year-old female with a above  history currently here for scheduled Prolia.  She continues to take tamoxifen daily as prescribed.  She did see her dentist several weeks ago who is concerned about a tooth on the upper right side that may need to be pulled.  She states that her dentist is not comfortable at this point with her receiving Prolia.  She cannot say for certain the timeframe for which her dentist wants her to stay off of pro Yue.  She said it could be \"any time now\" tooth needs to be pulled.  She currently does not have follow-up, however, with her dentist until approximately 4-5 months.    Ports feeling well with no new questions or concerns today.  Her CBC is stable.  She denies any new symptoms.      Past Medical History:   Diagnosis Date   • Breast cancer of upper-outer quadrant of right female breast (CMS/HCC) 10/13/2016   • Colon cancer (CMS/HCC)     had colon resection   • Diabetes (CMS/HCC)     borderline   • Diverticulosis    • Esophageal stricture     had it stretched via scope   • High cholesterol    • Hypertension    • Hypothyroid    • Pneumonia        ONCOLOGIC HISTORY:  (History from previous dates can be found in the separate document.)  patient is a 83-year-old female who states that she noticed a lump in " the upper outer quadrant of the right breast over the last 2 months. She thinks it was the size of a dime. She then underwent a mammogram on 8/3/2016 which showed a 1 cm mass in the middle third upper outer quadrant of the right breast. Additionly  there was a small asymmetry in the left breast. Spot compression showed partial resolution of this area in the left breast. This measures 0.8 cm. No evidence of axillary adenopathy seen.     Ultrasound was done which shows at 10 o'clock position in the right breast there is a 0.9 cm irregular heterogeneous mass. But no abnormality is seen in the left breast. A six-month follow-up on the left breast is suggested.     Patient then underwent ultrasound-guided biopsy on 8/17/2016. Allergies shows invasive mammary carcinoma ductal type with focal lobular features in the right breast, [place] score of 5 out of 9. There is no angiolymphatic invasion seen. Maximum dimension of invasive carcinoma in 7 mm. No carcinoma in situ identified. Estrogen receptors were greater than 90%, progesterone receptor is 90%, HER-2/breanne was 2+ on immunohistochemistry and negative by fish.     MRI of the breast shows middle third upper quadrant of the right breast at 10 o'clock position the biopsy cavity measured 1.1 cm from superior to inferior dimension 1.3 cm in anterior to posterior dimension and 2.1 cm from medial to lateral dimension. There were no other areas of enhancement in the left breast.  Chest x-ray no acute infiltrate.     Patient underwent lumpectomy on the right side with sentinel lymph node biopsy by Dr. Lee on 9/19/2016. Patient had invasive carcinoma 1.2 cm with lobular features. The overall grade is grade 2 with her [place] score of 6 out of 9. He had focal ductal carcinoma in situ and focal atypical lobular hyperplasia as well. The ductal carcinoma in situ was less than 1 mm, cribriform pattern and low nuclear grade grade 1. The margins were indeterminate for invasive  carcinoma but uninvolved by DCIS. Patient has had reexcised margins and invasive carcinoma is present only in the reexcised anterior margin. The tumor focally extends to within 1 mm of the reexcised anterior margin. The fibroinflammatory biopsy skin changes extend to the anterior margin. I will need to discuss with the pathologist about the margins. She has extends to within 3 mm was to margins and to within 5 mm in the inferior margin. The number of sentinel lymph nodes examined is to and there is no evidence of my or macro metastasis and no isolated tumor cells.     Patient now has been referred to radiation oncology and us for further options of treatment.  Given her age, her low stage, her strong estrogen and progesterone receptor positivity Dr. Nolasco felt that she does not need to undergo radiation.    Patient's bone density done shows osteoporosis.  Discussion done about consideration of tamoxifen versus Arimidex with prolia.  Patient refuses tamoxifen and is willing to consider Arimidex.  We did discuss that it'll be important for her to take prolia , calcium and vitamin D.  Pt could not tolerate prolia .    Pt on arimidex  and likely change to tamoxifen. Due to side effects    Arimidex discontinued secondary to severe arthralgias.  Tamoxifen started as of July 6, 2017.    .  Past medical history: Patient was diagnosed with the colon cancer back in 1989 and at the same time she had her ovaries removed and appendix and gallbladder removed. She didn't require any chemotherapy at the time.  Patient had pneumonia back in 1995.  Her hypertension  High cholesterol  Hypothyroidism  Patient has had colonoscopy 5 years ago by Dr. Tanner and apparently had a polyp she was to return to him for another colonoscopy in 5 years.  Patient had esophageal stricture as a result she underwent dilation by Dr. Hopson last year.  She underwent parathyroidectomy.  She has borderline diabetes. On diet control     OB/GYN history  she started metastases. At age 12, had menopause at age 50 when she underwent oophorectomy. She is  2 para 2 no miscarriages she has 2 children one is 54 and second one is 51-year-old and one grandchild.       Current Outpatient Medications on File Prior to Visit   Medication Sig Dispense Refill   • aspirin 81 MG tablet Take 81 mg by mouth daily.     • Calcium Citrate-Vitamin D (CALCIUM + D PO) Take 1,000 mg by mouth daily.     • Esomeprazole Magnesium (NEXIUM PO) Take 22.3 mg by mouth every morning.     • FIBER PO Take 2 capsules by mouth daily.     • levothyroxine (SYNTHROID, LEVOTHROID) 100 MCG tablet TAKE 1 TABLET BY MOUTH DAILY. 90 tablet 0   • losartan-hydrochlorothiazide (HYZAAR) 50-12.5 MG per tablet Take 1 tablet by mouth Daily. 90 tablet 2   • pravastatin (PRAVACHOL) 40 MG tablet Take 1 tablet by mouth Every Night. 90 tablet 2   • tamoxifen (NOLVADEX) 20 MG chemo tablet TAKE 1 TABLET BY MOUTH EVERY DAY 90 tablet 2   • vitamin C (ASCORBIC ACID) 500 MG tablet Take 500 mg by mouth Daily As Needed.       No current facility-administered medications on file prior to visit.        ALLERGIES:     Allergies   Allergen Reactions   • Morphine And Related Nausea And Vomiting       Social History     Socioeconomic History   • Marital status:      Spouse name: Not on file   • Number of children: 2   • Years of education: High school   • Highest education level: Not on file   Social Needs   • Financial resource strain: Not on file   • Food insecurity - worry: Not on file   • Food insecurity - inability: Not on file   • Transportation needs - medical: Not on file   • Transportation needs - non-medical: Not on file   Occupational History     Employer: RETIRED   Tobacco Use   • Smoking status: Former Smoker     Years: 5.00     Types: Cigarettes     Last attempt to quit: 1966     Years since quittin.9   • Smokeless tobacco: Never Used   Substance and Sexual Activity   • Alcohol use: No   • Drug use: No  "  • Sexual activity: Defer   Other Topics Concern   • Not on file   Social History Narrative   • Not on file         Cancer-related family history is negative for Breast cancer, Colon cancer, Endometrial cancer, and Ovarian cancer.     Review of Systems   Constitutional: Negative for appetite change, chills, diaphoresis, fatigue, fever and unexpected weight change.   HENT: Positive for dental problem. Negative for hearing loss, sore throat and trouble swallowing.         See HPI   Respiratory: Negative for cough, chest tightness, shortness of breath and wheezing.    Cardiovascular: Negative for chest pain, palpitations and leg swelling.   Gastrointestinal: Negative for abdominal distention, abdominal pain, constipation, diarrhea, nausea and vomiting.   Genitourinary: Negative for dysuria, frequency, hematuria and urgency.   Musculoskeletal: Negative for joint swelling.        No muscle weakness.   Skin: Negative for rash and wound.   Neurological: Negative for seizures, syncope, speech difficulty, weakness, numbness and headaches.   Hematological: Negative for adenopathy. Does not bruise/bleed easily.   Psychiatric/Behavioral: Negative for behavioral problems, confusion and suicidal ideas.     A comprehensive 14 point review of systems was performed and was negative except as mentioned.    Objective      Vitals:    12/05/18 1046   BP: 151/75   Pulse: 80   Resp: 18   Temp: 97.6 °F (36.4 °C)   TempSrc: Oral   SpO2: 97%   Weight: 75.4 kg (166 lb 4.8 oz)   Height: 162.6 cm (64.02\")   PainSc: 0-No pain     Current Status 12/5/2018   ECOG score 0       Physical Exam      GENERAL:  Well-developed, well-nourished in no acute distress.   SKIN:  Warm, dry without rashes, purpura or petechiae.  EYES:  Pupils equal, round and reactive to light.  EOMs intact.  Conjunctivae normal.  EARS:  Hearing intact.  NOSE:  Septum midline.  No excoriations or nasal discharge.  MOUTH:  Tongue is well-papillated; no stomatitis or ulcers.  " "Lips normal.  THROAT:  Oropharynx without lesions or exudates.  NECK:  Supple with good range of motion; no thyromegaly or masses, no JVD.  LYMPHATICS:  No cervical, supraclavicular, axillary or inguinal adenopathy.  CHEST:  Lungs clear to auscultation. Good airflow.  No evidence of any bilateral breast masses, no nipple discharge bilaterally, no evidence of bilateral axillary adenopathy, no evidence of bilateral supraclavicular adenopathy:  CARDIAC:  Regular rate and rhythm without murmurs, rubs or gallops. Normal S1,S2.  ABDOMEN:  Soft, nontender with no hepatosplenomegaly or masses.  EXTREMITIES:  No clubbing, cyanosis or edema.  NEUROLOGICAL:  Cranial Nerves II-XII grossly intact.  No focal neurological deficits.  PSYCHIATRIC:  Normal affect and mood.    RECENT LABS:  Hematology WBC   Date Value Ref Range Status   10/24/2018 9.27 4.50 - 10.70 10*3/mm3 Final     RBC   Date Value Ref Range Status   10/24/2018 4.18 3.90 - 5.20 10*6/mm3 Final   02/25/2016 4.63  Final     Hemoglobin   Date Value Ref Range Status   10/24/2018 12.3 11.9 - 15.5 g/dL Final     Hematocrit   Date Value Ref Range Status   10/24/2018 36.6 35.6 - 45.5 % Final     Platelets   Date Value Ref Range Status   10/24/2018 167 140 - 500 10*3/mm3 Final        Assessment/Plan        1. T1 cN0 M0 invasive ductal carcinoma of the right breast, with lobular features, ER positive at 90%, MI positive at 90%, HER-2/breanne 2+ by minimal histochemistry and negative by fish with a HER-2/CEP ratio of 1.1.      She is really on tamoxifen daily and tolerating well.      2. Osteoporosis:  She is on  calcium 1200 mg once daily, vitamin D 1000 international units daily as well.  Patient scheduled for pro Yue today, however, reports that her dentist is not comfortable with her receiving Prolia  as she has a tooth that \"needs to come out soon.\"      Plan  1.  Continue tamoxifen    2 She will return in 6 months as scheduled by Dr. Galicia  She will be scheduled for " Prolia this day. She has agreed to discuss treatment with her dentist again in the upcoming months.  I have instructed the patient to call the office should she want to schedule her Prolia sooner than 6 months from now.    3. We reviewed her mammogram results today performed on  10/31/2018 - with negative results.         LISS Montero     Cc: Dr. James Lee

## 2018-12-20 RX ORDER — TAMOXIFEN CITRATE 20 MG/1
TABLET ORAL
Qty: 90 TABLET | Refills: 1 | Status: SHIPPED | OUTPATIENT
Start: 2018-12-20 | End: 2018-12-28

## 2018-12-28 ENCOUNTER — APPOINTMENT (OUTPATIENT)
Dept: PREADMISSION TESTING | Facility: HOSPITAL | Age: 83
End: 2018-12-28

## 2018-12-28 VITALS
RESPIRATION RATE: 16 BRPM | OXYGEN SATURATION: 97 % | HEART RATE: 80 BPM | HEIGHT: 63 IN | DIASTOLIC BLOOD PRESSURE: 76 MMHG | SYSTOLIC BLOOD PRESSURE: 156 MMHG | BODY MASS INDEX: 29.59 KG/M2 | TEMPERATURE: 97 F | WEIGHT: 167 LBS

## 2018-12-28 LAB
ANION GAP SERPL CALCULATED.3IONS-SCNC: 10.9 MMOL/L
BUN BLD-MCNC: 10 MG/DL (ref 8–23)
BUN/CREAT SERPL: 12.7 (ref 7–25)
CALCIUM SPEC-SCNC: 9 MG/DL (ref 8.6–10.5)
CHLORIDE SERPL-SCNC: 101 MMOL/L (ref 98–107)
CO2 SERPL-SCNC: 25.1 MMOL/L (ref 22–29)
CREAT BLD-MCNC: 0.79 MG/DL (ref 0.57–1)
DEPRECATED RDW RBC AUTO: 41.8 FL (ref 37–54)
ERYTHROCYTE [DISTWIDTH] IN BLOOD BY AUTOMATED COUNT: 13.1 % (ref 11.7–13)
GFR SERPL CREATININE-BSD FRML MDRD: 69 ML/MIN/1.73
GLUCOSE BLD-MCNC: 123 MG/DL (ref 65–99)
HCT VFR BLD AUTO: 37.2 % (ref 35.6–45.5)
HGB BLD-MCNC: 12.7 G/DL (ref 11.9–15.5)
MCH RBC QN AUTO: 29.7 PG (ref 26.9–32)
MCHC RBC AUTO-ENTMCNC: 34.1 G/DL (ref 32.4–36.3)
MCV RBC AUTO: 86.9 FL (ref 80.5–98.2)
PLATELET # BLD AUTO: 175 10*3/MM3 (ref 140–500)
PMV BLD AUTO: 10.4 FL (ref 6–12)
POTASSIUM BLD-SCNC: 4.3 MMOL/L (ref 3.5–5.2)
RBC # BLD AUTO: 4.28 10*6/MM3 (ref 3.9–5.2)
SODIUM BLD-SCNC: 137 MMOL/L (ref 136–145)
WBC NRBC COR # BLD: 8.35 10*3/MM3 (ref 4.5–10.7)

## 2018-12-28 PROCEDURE — 93010 ELECTROCARDIOGRAM REPORT: CPT | Performed by: INTERNAL MEDICINE

## 2018-12-28 PROCEDURE — 80048 BASIC METABOLIC PNL TOTAL CA: CPT | Performed by: INTERNAL MEDICINE

## 2018-12-28 PROCEDURE — 93005 ELECTROCARDIOGRAM TRACING: CPT

## 2018-12-28 PROCEDURE — 36415 COLL VENOUS BLD VENIPUNCTURE: CPT

## 2018-12-28 PROCEDURE — 85027 COMPLETE CBC AUTOMATED: CPT | Performed by: INTERNAL MEDICINE

## 2018-12-28 RX ORDER — LANOLIN ALCOHOL/MO/W.PET/CERES
50 CREAM (GRAM) TOPICAL DAILY
COMMUNITY
End: 2022-06-06

## 2018-12-28 RX ORDER — CETIRIZINE HYDROCHLORIDE 10 MG/1
10 TABLET ORAL DAILY
COMMUNITY
End: 2019-06-12

## 2018-12-28 RX ORDER — LEVOTHYROXINE SODIUM 0.1 MG/1
100 TABLET ORAL DAILY
COMMUNITY
End: 2019-03-05 | Stop reason: SDUPTHER

## 2018-12-28 RX ORDER — TAMOXIFEN CITRATE 20 MG/1
20 TABLET ORAL DAILY
COMMUNITY
End: 2019-06-12

## 2018-12-31 ENCOUNTER — TELEPHONE (OUTPATIENT)
Dept: GASTROENTEROLOGY | Facility: CLINIC | Age: 83
End: 2018-12-31

## 2019-01-08 ENCOUNTER — TELEPHONE (OUTPATIENT)
Dept: INTERNAL MEDICINE | Facility: CLINIC | Age: 84
End: 2019-01-08

## 2019-01-08 ENCOUNTER — TELEPHONE (OUTPATIENT)
Dept: ONCOLOGY | Facility: CLINIC | Age: 84
End: 2019-01-08

## 2019-01-08 DIAGNOSIS — R94.31 ABNORMAL EKG: Primary | ICD-10-CM

## 2019-01-08 NOTE — TELEPHONE ENCOUNTER
Call to Dr Galicia's office @ [phone] and spoke with Jeanette.  Checking for cardiac clearance from  Dr Galicia.  Awaiting reply.

## 2019-01-08 NOTE — TELEPHONE ENCOUNTER
Call from Maria T at Dr Galicia's office.  She is currently out of [place].  CBC will not address cardiac clearance.    Call to Dr Jorge Luis Weinberg's office @ [phone] - TM left requesting cardiac clearance or referral to cardiology for clearance for c/s.  Awaiting reply.

## 2019-01-08 NOTE — TELEPHONE ENCOUNTER
----- Message from Jeanette Aguirre sent at 1/8/2019  9:08 AM EST -----  Call anita with dr yasmany irizarry off  Pt needs  to be cleared for surgery      [phone]  
S/w anita at dr. yasmany ugalde's office.  Pt. Is due to have a c-scope done.  States she had a ekg for clearance and it came back abnormal.  Pt. Doesn't have a cardiologist.  Is inquiring if dr. colón would clear pt for the c-scope.  Informed Anita that dr. Colón is out of the country and since we our not cardiologist we would have to send pt. To see a cardiologist.  She states they will work on that.    
+1cm right occipital scalp laceration  no active bleeding  no bony step off

## 2019-01-08 NOTE — TELEPHONE ENCOUNTER
Rachelle ([phone]) with Dr. Dashawn Hopson's office called stating that patient was to have a colonoscopy but had an abnormal EKG during her pre op evaluation.  The anesthesiologist stated that patient need to be cleared by cardiology before having the procedure.  Patient does not currently have a cardiologist so they are asking Dr. Weinberg to refer her to one.  Please advise.

## 2019-01-09 NOTE — TELEPHONE ENCOUNTER
See DR Weinberg's note of 1/8 - referral placed to Cardiology.  It is noted that appt scheduled for 1/15/19 with DR Ana Jain, Cardiology, on 1/15.

## 2019-01-15 ENCOUNTER — OFFICE VISIT (OUTPATIENT)
Dept: CARDIOLOGY | Facility: CLINIC | Age: 84
End: 2019-01-15

## 2019-01-15 VITALS
SYSTOLIC BLOOD PRESSURE: 148 MMHG | WEIGHT: 166 LBS | HEART RATE: 86 BPM | DIASTOLIC BLOOD PRESSURE: 90 MMHG | BODY MASS INDEX: 29.41 KG/M2 | HEIGHT: 63 IN

## 2019-01-15 DIAGNOSIS — I10 ESSENTIAL HYPERTENSION: ICD-10-CM

## 2019-01-15 DIAGNOSIS — E78.5 HYPERLIPIDEMIA, UNSPECIFIED HYPERLIPIDEMIA TYPE: ICD-10-CM

## 2019-01-15 DIAGNOSIS — R94.31 ABNORMAL EKG: Primary | ICD-10-CM

## 2019-01-15 DIAGNOSIS — E13.9 OTHER SPECIFIED DIABETES MELLITUS WITHOUT COMPLICATION, WITHOUT LONG-TERM CURRENT USE OF INSULIN (HCC): ICD-10-CM

## 2019-01-15 PROCEDURE — 93000 ELECTROCARDIOGRAM COMPLETE: CPT | Performed by: INTERNAL MEDICINE

## 2019-01-15 PROCEDURE — 99204 OFFICE O/P NEW MOD 45 MIN: CPT | Performed by: INTERNAL MEDICINE

## 2019-01-15 RX ORDER — LEVOTHYROXINE SODIUM 0.1 MG/1
TABLET ORAL
Qty: 90 TABLET | Refills: 0 | Status: SHIPPED | OUTPATIENT
Start: 2019-01-15 | End: 2019-03-05 | Stop reason: SDUPTHER

## 2019-01-15 NOTE — PROGRESS NOTES
Subjective:     Encounter Date:01/15/2019      Patient ID: Marcelle Johnson is a 86 y.o. female.    Chief Complaint:  This is an 86-year-old lady with a past medical history of breast cancer, colon cancer, diet-controlled diabetes, hypertension, hyperlipidemia.  She presents for preoperative evaluation prior to EGD with esophageal stricture dilation as well as screening colonoscopy.  She was found to have an abnormal EKG in December 2018 showing a left bundle branch block.  EKG was last performed in our system in 2016 which showed normal sinus rhythm with a limited left anterior ventricular block.  Patient feels well.  She's had no change in her functional status.  She has stairs at home and is able to climb the stairs without any difficulty.  She does her own housework including sweeping and vacuuming the area rugs.  Her major limiting symptom is back pain with vacuuming.  She can walk an unlimited amount of distance.  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  She has had esophageal stricture dilation in the past about 2 or 3 years ago with decent result.  She does notice that she has dysphagia with pills and eating large chunks of food.  She currently takes an aspirin is on a beta blocker.  She is almost certain for blood pressure and pravastatin for cholesterol.        The following portions of the patient's history were reviewed and updated as appropriate: allergies, current medications, past family history, past medical history, past social history, past surgical history and problem list.    Past Medical History:   Diagnosis Date   • Breast cancer of upper-outer quadrant of right female breast (CMS/HCC) 10/13/2016   • Colon cancer (CMS/HCC)     had colon resection   • Diabetes (CMS/HCC)     borderline   • Diverticulosis    • Esophageal stricture     had it stretched via scope   • High cholesterol    • History of bacterial  pneumonia    • Hypertension    • Hypothyroid    • Sleep trouble        Family History   Problem Relation Age of Onset   • Diabetes Mother    • No Known Problems Father    • No Known Problems Sister    • No Known Problems Brother    • No Known Problems Daughter    • No Known Problems Son    • No Known Problems Maternal Grandmother    • No Known Problems Paternal Grandmother    • No Known Problems Maternal Aunt    • No Known Problems Paternal Aunt    • BRCA 1/2 Neg Hx    • Breast cancer Neg Hx    • Colon cancer Neg Hx    • Endometrial cancer Neg Hx    • Ovarian cancer Neg Hx    • Malig Hyperthermia Neg Hx        Social History     Socioeconomic History   • Marital status:      Spouse name: Not on file   • Number of children: 2   • Years of education: High school   • Highest education level: Not on file   Occupational History     Employer: RETIRED   Tobacco Use   • Smoking status: Former Smoker     Years: 5.00     Types: Cigarettes     Last attempt to quit: 1966     Years since quittin.0   • Smokeless tobacco: Never Used   Substance and Sexual Activity   • Alcohol use: No   • Drug use: No   • Sexual activity: Defer       Allergies   Allergen Reactions   • Morphine And Related Nausea And Vomiting       Past Surgical History:   Procedure Laterality Date   • APPENDECTOMY     • BREAST BIOPSY Right    • BREAST LUMPECTOMY WITH SENTINEL NODE BIOPSY Right 2016    Procedure: RIGHT BREAST LUMPECTOMY WITH SENTINEL NODE BIOPSY;  Surgeon: James Lee MD;  Location: [facility];  Service:    • CHOLECYSTECTOMY     • COLON RESECTION  1990    colon cancer early stage   • COLONOSCOPY  10/05/2011    tics, tubular adenoma polyp    • ENDOSCOPY  2015    Z line irreg, HH, gastritis, bilious gastric fluid, duodenitis.     • HYSTERECTOMY      partial   • OOPHORECTOMY     • PARATHYROID GLAND SURGERY      partial       Review of Systems   Constitution: Positive for weight loss. Negative for weakness and  malaise/fatigue.   HENT: Positive for congestion.    Cardiovascular: Negative for chest pain, claudication, dyspnea on exertion, irregular heartbeat, leg swelling, near-syncope, orthopnea, palpitations, paroxysmal nocturnal dyspnea and syncope.   Respiratory: Positive for cough.    Gastrointestinal: Positive for dysphagia.   Neurological: Negative for dizziness, light-headedness and loss of balance.         ECG 12 Lead  Date/Time: 1/15/2019 12:50 PM  Performed by: Ana Jain MD  Authorized by: Ana Jain MD   Comparison: compared with previous ECG from 12/18/2018  Similar to previous ECG  Rhythm: sinus rhythm  Conduction: left bundle branch block  Clinical impression: abnormal ECG               Objective:     Physical Exam  GEN: no distress, alert and oriented  Lungs CTAB, no rales or wheezes  Chest: no abnormalities  Heart: RRR, no murmurs  Abdo: soft,  Nontender, nondistended  Extr: no edema, +2 DP and 2+ carotid pulses b/l  Skin: no rash or bruising  Psych: organized thought, normal behavior and affect    Lab Review:         Assessment:          Diagnosis Plan   1. Abnormal EKG     2. Other specified diabetes mellitus without complication, without long-term current use of insulin (CMS/[facility])     3. Essential hypertension     4. Hyperlipidemia, unspecified hyperlipidemia type            Plan:         1. Preoperative evaluation: This is an 86-year-old lady with a left bundle branch block who presents for preoperative evaluation prior to EGD, esophageal dilation and colonoscopy.  While this LBBB is new compared to her 2016 EKG, at that time she had LAFB, which suggests that this is just progression over time due to age. There is no indication that this LBBB is an ischemic equivalent given her lack of symptoms and excellent functional status of well over 4 METS.    She has no active cardiac conditions including no evidence of ACS, congestive heart failure, ventricular arrhythmias, severe valvular disease.  She  is going for a low risk endoscopic procedure.  I do not believe she needs any further cardiac testing prior.  She may proceed with her procedure with acceptable cardiovascular risk.    2. HTN: well controlled.   3. HLD: on pravastatin  4. DM: diet controlled.     Dr. Hopson, thank you for referring this patient to me. She should see me again in one year.          Ana Jain MD  01/15/19

## 2019-01-22 NOTE — TELEPHONE ENCOUNTER
Reason for offering her EGD and colonoscopy in the main or is because she got hypoxic last time we tried to perform upper endoscopy and dilation.  So yes that would be the appropriate setting for her examinations.

## 2019-03-05 ENCOUNTER — OFFICE VISIT (OUTPATIENT)
Dept: INTERNAL MEDICINE | Facility: CLINIC | Age: 84
End: 2019-03-05

## 2019-03-05 VITALS
SYSTOLIC BLOOD PRESSURE: 148 MMHG | DIASTOLIC BLOOD PRESSURE: 72 MMHG | HEART RATE: 69 BPM | OXYGEN SATURATION: 97 % | HEIGHT: 63 IN | WEIGHT: 165.2 LBS | BODY MASS INDEX: 29.27 KG/M2 | RESPIRATION RATE: 16 BRPM

## 2019-03-05 DIAGNOSIS — E13.59 OTHER SPECIFIED DIABETES MELLITUS WITH OTHER CIRCULATORY COMPLICATION, WITHOUT LONG-TERM CURRENT USE OF INSULIN (HCC): ICD-10-CM

## 2019-03-05 DIAGNOSIS — E03.9 ACQUIRED HYPOTHYROIDISM: ICD-10-CM

## 2019-03-05 DIAGNOSIS — I10 ESSENTIAL HYPERTENSION: ICD-10-CM

## 2019-03-05 DIAGNOSIS — E78.2 MIXED HYPERLIPIDEMIA: Primary | ICD-10-CM

## 2019-03-05 PROBLEM — J40 BRONCHITIS: Status: RESOLVED | Noted: 2018-03-06 | Resolved: 2019-03-05

## 2019-03-05 PROBLEM — Z00.00 MEDICARE ANNUAL WELLNESS VISIT, SUBSEQUENT: Status: ACTIVE | Noted: 2019-03-05

## 2019-03-05 LAB
HBA1C MFR BLD: 6.9 % (ref 4.8–5.6)
TSH SERPL DL<=0.005 MIU/L-ACNC: 2.79 MIU/ML (ref 0.27–4.2)

## 2019-03-05 PROCEDURE — 99214 OFFICE O/P EST MOD 30 MIN: CPT | Performed by: FAMILY MEDICINE

## 2019-03-05 PROCEDURE — G0439 PPPS, SUBSEQ VISIT: HCPCS | Performed by: FAMILY MEDICINE

## 2019-03-05 RX ORDER — LOSARTAN POTASSIUM AND HYDROCHLOROTHIAZIDE 12.5; 1 MG/1; MG/1
1 TABLET ORAL DAILY
Qty: 90 TABLET | Refills: 2 | Status: SHIPPED | OUTPATIENT
Start: 2019-03-05 | End: 2019-11-13 | Stop reason: SDUPTHER

## 2019-03-05 RX ORDER — LEVOTHYROXINE SODIUM 0.1 MG/1
100 TABLET ORAL DAILY
Qty: 90 TABLET | Refills: 2 | Status: SHIPPED | OUTPATIENT
Start: 2019-03-05 | End: 2020-01-07

## 2019-03-05 RX ORDER — PRAVASTATIN SODIUM 40 MG
40 TABLET ORAL NIGHTLY
Qty: 90 TABLET | Refills: 2 | Status: SHIPPED | OUTPATIENT
Start: 2019-03-05 | End: 2022-06-06

## 2019-03-05 RX ORDER — LOSARTAN POTASSIUM AND HYDROCHLOROTHIAZIDE 12.5; 5 MG/1; MG/1
1 TABLET ORAL DAILY
Qty: 90 TABLET | Refills: 2 | Status: CANCELLED | OUTPATIENT
Start: 2019-03-05

## 2019-03-05 NOTE — PROGRESS NOTES
Marcelle Johnson is a 86 y.o. female.      Assessment/Plan   Problem List Items Addressed This Visit        Cardiovascular and Mediastinum    Mixed hyperlipidemia - Primary    Relevant Medications    pravastatin (PRAVACHOL) 40 MG tablet    Essential hypertension    Relevant Medications    losartan-hydrochlorothiazide (HYZAAR) 100-12.5 MG per tablet       Endocrine    Diabetes mellitus (CMS/HCC)    Relevant Orders    Hemoglobin A1c    Hypothyroidism    Relevant Medications    levothyroxine (SYNTHROID, LEVOTHROID) 100 MCG tablet    Other Relevant Orders    TSH           Follow-up results of blood work for ongoing management of hypothyroidism diabetes increase losartan hydrochlorothiazide to 100/12.5 monitor blood pressure goals less than 140/90 follow-up otherwise as needed or in 6 months  Return in about 6 months (around 2019), or if symptoms worsen or fail to improve, for Recheck, Next scheduled follow up.      Chief Complaint   Patient presents with   • Follow-up     for cholesterol   • Follow-up     for hypothyroid   • Follow-up     for diabetes   • Follow-up     for hypertension, getting high readings    • Medicare Wellness-subsequent     Social History     Tobacco Use   • Smoking status: Former Smoker     Years: 5.00     Types: Cigarettes     Last attempt to quit: 1966     Years since quittin.2   • Smokeless tobacco: Never Used   Substance Use Topics   • Alcohol use: No   • Drug use: No       History of Present Illness   Patient follows up for chronic problems of hypertension hyperlipidemia diabetes hypothyroidism she is been having some recent elevated blood pressure readings no chest pain shortness of breath or increased fatigue or dizziness no unwanted side effects of medication no true medical symptoms to have poor regulation of her thyroid  The following portions of the patient's history were reviewed and updated as appropriate:PMHroutine: Social history , Allergies, Current Medications, Active  "Problem List and Health Maintenance    Review of Systems   Constitutional: Negative for activity change, appetite change and unexpected weight change.   HENT: Negative for nosebleeds and trouble swallowing.    Eyes: Negative for pain and visual disturbance.   Respiratory: Negative for chest tightness, shortness of breath and wheezing.    Cardiovascular: Negative for chest pain and palpitations.   Gastrointestinal: Negative for abdominal pain and blood in stool.   Endocrine: Negative.    Genitourinary: Negative for difficulty urinating and hematuria.   Musculoskeletal: Negative for joint swelling.   Skin: Negative for color change and rash.   Allergic/Immunologic: Negative.    Neurological: Negative for syncope and speech difficulty.   Hematological: Negative for adenopathy.   Psychiatric/Behavioral: Negative for agitation and confusion.   All other systems reviewed and are negative.      Objective   Vitals:    03/05/19 0830   BP: 148/72   BP Location: Left arm   Patient Position: Sitting   Cuff Size: Adult   Pulse: 69   Resp: 16   SpO2: 97%   Weight: 74.9 kg (165 lb 3.2 oz)   Height: 160.7 cm (63.25\")     Body mass index is 29.03 kg/m².  Physical Exam   Constitutional: She is oriented to person, place, and time. She appears well-developed and well-nourished. No distress.   HENT:   Head: Normocephalic and atraumatic.   Eyes: Conjunctivae and EOM are normal. Pupils are equal, round, and reactive to light. Right eye exhibits no discharge. Left eye exhibits no discharge. No scleral icterus.   Neck: Normal range of motion. Neck supple. No tracheal deviation present. No thyromegaly present.   Cardiovascular: Normal rate, regular rhythm, normal heart sounds, intact distal pulses and normal pulses. Exam reveals no gallop.   No murmur heard.  Pulmonary/Chest: Effort normal and breath sounds normal. No respiratory distress. She has no wheezes. She has no rales.   Musculoskeletal: Normal range of motion. She exhibits no edema. "   Neurological: She is alert and oriented to person, place, and time. She exhibits normal muscle tone. Coordination normal.   Skin: Skin is warm. No rash noted. No erythema. No pallor.   Psychiatric: She has a normal mood and affect. Her behavior is normal. Judgment and thought content normal.   Nursing note and vitals reviewed.    Reviewed Data:  No visits with results within 1 Month(s) from this visit.   Latest known visit with results is:   Admission on 01/18/2019, Discharged on 01/18/2019   Component Date Value Ref Range Status   • Color 01/18/2019 Yellow  Yellow, Straw, Dark Yellow, Pascale Final   • Clarity, UA 01/18/2019 Cloudy* Clear Final   • Glucose, UA 01/18/2019 Negative  Negative, 1000 mg/dL (3+) mg/dL Final   • Bilirubin 01/18/2019 Negative  Negative Final   • Ketones, UA 01/18/2019 Negative  Negative Final   • Specific Gravity  01/18/2019 1.020  1.005 - 1.030 Final   • Blood, UA 01/18/2019 Moderate* Negative Final   • pH, Urine 01/18/2019 7.0  5.0 - 8.0 Final   • Protein, POC 01/18/2019 Trace* Negative mg/dL Final   • Urobilinogen, UA 01/18/2019 Normal  Normal Final   • Nitrite, UA 01/18/2019 Negative  Negative Final   • Leukocytes 01/18/2019 Moderate (2+)* Negative Final

## 2019-03-05 NOTE — PROGRESS NOTES
QUICK REFERENCE INFORMATION:  The ABCs of the Annual Wellness Visit    Subsequent Medicare Wellness Visit    HEALTH RISK ASSESSMENT    1933    Recent Hospitalizations:  No hospitalization(s) within the last year..        Current Medical Providers:  Patient Care Team:  Jorge Luis Weinberg MD as PCP - General (Family Medicine)  Maria M Galicia MD as PCP - Claims Attributed  Maria M Galicia MD as Consulting Physician (Hematology and Oncology)  James Lee MD as Surgeon (Breast Surgery)  James Lee MD as Referring Physician (Breast Surgery)        Smoking Status:  Social History     Tobacco Use   Smoking Status Former Smoker   • Years: 5.00   • Types: Cigarettes   • Last attempt to quit:    • Years since quittin.2   Smokeless Tobacco Never Used       Alcohol Consumption:  Social History     Substance and Sexual Activity   Alcohol Use No       Depression Screen:   PHQ-2/PHQ-9 Depression Screening 3/5/2019   Little interest or pleasure in doing things 0   Feeling down, depressed, or hopeless 0   Trouble falling or staying asleep, or sleeping too much -   Feeling tired or having little energy -   Poor appetite or overeating -   Feeling bad about yourself - or that you are a failure or have let yourself or your family down -   Trouble concentrating on things, such as reading the newspaper or watching television -   Moving or speaking so slowly that other people could have noticed. Or the opposite - being so fidgety or restless that you have been moving around a lot more than usual -   Thoughts that you would be better off dead, or of hurting yourself in some way -   Total Score 0   If you checked off any problems, how difficult have these problems made it for you to do your work, take care of things at home, or get along with other people? -       Health Habits and Functional and Cognitive Screening:  Functional & Cognitive Status 3/5/2019   Do you have difficulty preparing food and eating?  No   Do you have difficulty bathing yourself, getting dressed or grooming yourself? No   Do you have difficulty using the toilet? No   Do you have difficulty moving around from place to place? No   Do you have trouble with steps or getting out of a bed or a chair? No   In the past year have you fallen or experienced a near fall? No   Current Diet Well Balanced Diet   Dental Exam Up to date   Eye Exam Up to date   Exercise (times per week) 0 times per week   Current Exercise Activities Include None   Do you need help using the phone?  No   Are you deaf or do you have serious difficulty hearing?  No   Do you need help with transportation? No   Do you need help shopping? No   Do you need help preparing meals?  No   Do you need help with housework?  No   Do you need help with laundry? No   Do you need help taking your medications? No   Do you need help managing money? No   Do you ever drive or ride in a car without wearing a seat belt? No   Have you felt unusual stress, anger or loneliness in the last month? No   Who do you live with? Spouse   If you need help, do you have trouble finding someone available to you? No   Have you been bothered in the last four weeks by sexual problems? No   Do you have difficulty concentrating, remembering or making decisions? No           Does the patient have evidence of cognitive impairment? No    Aspirin use counseling: Taking ASA appropriately as indicated      Recent Lab Results:  CMP:  Lab Results   Component Value Date     (H) 08/21/2018    BUN 10 12/28/2018    CREATININE 0.79 12/28/2018    EGFRIFNONA 69 12/28/2018    EGFRIFAFRI 77 08/21/2018    BCR 12.7 12/28/2018     12/28/2018    K 4.3 12/28/2018    CO2 25.1 12/28/2018    CALCIUM 9.0 12/28/2018    PROTENTOTREF 6.1 08/21/2018    ALBUMIN 3.60 10/24/2018    LABGLOBREF 2.3 08/21/2018    LABIL2 1.7 08/21/2018    BILITOT 0.3 10/24/2018    ALKPHOS 46 10/24/2018    AST 24 10/24/2018    ALT 19 10/24/2018     Lipid  Panel:  Lab Results   Component Value Date    TRIG 86 08/21/2018    HDL 45 08/21/2018    VLDL 17 08/21/2018    LDLHDL 1.2 01/05/2016     HbA1c:  Lab Results   Component Value Date    HGBA1C 6.6 (H) 08/21/2018       Visual Acuity:  No exam data present    Age-appropriate Screening Schedule:  Refer to the list below for future screening recommendations based on patient's age, sex and/or medical conditions. Orders for these recommended tests are listed in the plan section. The patient has been provided with a written plan.    Health Maintenance   Topic Date Due   • ZOSTER VACCINE (2 of 3) 02/26/2015   • HEMOGLOBIN A1C  02/21/2019   • LIPID PANEL  08/21/2019   • URINE MICROALBUMIN  08/21/2019   • DXA SCAN  10/19/2020   • MAMMOGRAM  10/31/2020   • TDAP/TD VACCINES (2 - Td) 08/24/2027   • INFLUENZA VACCINE  Completed   • PNEUMOCOCCAL VACCINES (65+ LOW/MEDIUM RISK)  Discontinued        Subjective   History of Present Illness    Marcelle Johnson is a 86 y.o. female who presents for an Subsequent Wellness Visit.    The following portions of the patient's history were reviewed and updated as appropriate: allergies, current medications, past family history, past medical history, past social history, past surgical history and problem list.    Outpatient Medications Prior to Visit   Medication Sig Dispense Refill   • aspirin 81 MG tablet Take 81 mg by mouth daily.     • Calcium Citrate-Vitamin D (CALCIUM + D PO) Take 1,000 mg by mouth daily.     • cetirizine (zyrTEC) 10 MG tablet Take 10 mg by mouth Daily.     • Esomeprazole Magnesium (NEXIUM PO) Take 22.3 mg by mouth every morning.     • FIBER PO Take 2 capsules by mouth daily.     • levothyroxine (SYNTHROID, LEVOTHROID) 100 MCG tablet TAKE 1 TABLET BY MOUTH DAILY. 90 tablet 0   • losartan-hydrochlorothiazide (HYZAAR) 50-12.5 MG per tablet Take 1 tablet by mouth Daily. 90 tablet 2   • pravastatin (PRAVACHOL) 40 MG tablet Take 1 tablet by mouth Every Night. 90 tablet 2   •  "tamoxifen (NOLVADEX) 20 MG chemo tablet Take 20 mg by mouth Daily.     • vitamin B-6 (PYRIDOXINE) 50 MG tablet Take 50 mg by mouth Daily.     • vitamin C (ASCORBIC ACID) 500 MG tablet Take 500 mg by mouth Daily As Needed.     • levothyroxine (SYNTHROID, LEVOTHROID) 100 MCG tablet Take 100 mcg by mouth Daily.       No facility-administered medications prior to visit.        Patient Active Problem List   Diagnosis   • Abnormal brain scan   • Abnormal finding on thyroid function test   • Arthritis   • Diabetes mellitus (CMS/HCC)   • Dysphagia   • Mixed hyperlipidemia   • Essential hypertension   • Hypothyroidism   • Osteoporosis   • Itching   • Borderline diabetes   • Breast cancer of upper-outer quadrant of right female breast (CMS/HCC)   • Medicare annual wellness visit, initial   • Hyponatremia   • Bronchitis   • Other osteoporosis without current pathological fracture   • Esophageal stricture   • Adenomatous polyp of colon   • Malignant neoplasm of colon (CMS/[facility])   • Medicare annual wellness visit, subsequent       Advance Care Planning:  has NO advance directive - information provided to the patient today  Yana Correa - daughter  Identification of Risk Factors:  Risk factors include: inactivity.    Review of Systems    Compared to one year ago, the patient feels her physical health is the same.  Compared to one year ago, the patient feels her mental health is the same.    Objective     Physical Exam    Vitals:    03/05/19 0830   BP: 148/72   BP Location: Left arm   Patient Position: Sitting   Cuff Size: Adult   Pulse: 69   Resp: 16   SpO2: 97%   Weight: 74.9 kg (165 lb 3.2 oz)   Height: 160.7 cm (63.25\")  Comment: updated,no shoes   PainSc: 0-No pain       Patient's Body mass index is 29.03 kg/m². BMI is above normal parameters. Recommendations include: nutrition counseling.      Assessment/Plan   Patient Self-Management and Personalized Health Advice  The patient has been provided with information about: " diet, exercise, weight management and prevention of cardiac or vascular disease and preventive services including:   · Advance directive.    Visit Diagnoses:    ICD-10-CM ICD-9-CM   1. Mixed hyperlipidemia E78.2 272.2   2. Essential hypertension I10 401.9   3. Other specified diabetes mellitus with other circulatory complication, without long-term current use of insulin (CMS/[facility]) E13.59 250.70       No orders of the defined types were placed in this encounter.      Outpatient Encounter Medications as of 3/5/2019   Medication Sig Dispense Refill   • aspirin 81 MG tablet Take 81 mg by mouth daily.     • Calcium Citrate-Vitamin D (CALCIUM + D PO) Take 1,000 mg by mouth daily.     • cetirizine (zyrTEC) 10 MG tablet Take 10 mg by mouth Daily.     • Esomeprazole Magnesium (NEXIUM PO) Take 22.3 mg by mouth every morning.     • FIBER PO Take 2 capsules by mouth daily.     • levothyroxine (SYNTHROID, LEVOTHROID) 100 MCG tablet TAKE 1 TABLET BY MOUTH DAILY. 90 tablet 0   • losartan-hydrochlorothiazide (HYZAAR) 50-12.5 MG per tablet Take 1 tablet by mouth Daily. 90 tablet 2   • pravastatin (PRAVACHOL) 40 MG tablet Take 1 tablet by mouth Every Night. 90 tablet 2   • tamoxifen (NOLVADEX) 20 MG chemo tablet Take 20 mg by mouth Daily.     • vitamin B-6 (PYRIDOXINE) 50 MG tablet Take 50 mg by mouth Daily.     • vitamin C (ASCORBIC ACID) 500 MG tablet Take 500 mg by mouth Daily As Needed.     • [DISCONTINUED] levothyroxine (SYNTHROID, LEVOTHROID) 100 MCG tablet Take 100 mcg by mouth Daily.       No facility-administered encounter medications on file as of 3/5/2019.        Reviewed use of high risk medication in the elderly: yes  Reviewed for potential of harmful drug interactions in the elderly: yes    Follow Up:  No Follow-up on file.   Patient to follow  Up with Dr. Hopson for colon CA surveillance  An After Visit Summary and PPPS with all of these plans were given to the patient.

## 2019-03-29 ENCOUNTER — ANESTHESIA EVENT (OUTPATIENT)
Dept: PERIOP | Facility: HOSPITAL | Age: 84
End: 2019-03-29

## 2019-03-29 ENCOUNTER — HOSPITAL ENCOUNTER (OUTPATIENT)
Facility: HOSPITAL | Age: 84
Setting detail: HOSPITAL OUTPATIENT SURGERY
Discharge: HOME | End: 2019-03-29
Attending: INTERNAL MEDICINE | Admitting: INTERNAL MEDICINE

## 2019-03-29 ENCOUNTER — ANESTHESIA (OUTPATIENT)
Dept: PERIOP | Facility: HOSPITAL | Age: 84
End: 2019-03-29

## 2019-03-29 VITALS
HEART RATE: 85 BPM | HEIGHT: 64 IN | BODY MASS INDEX: 27.75 KG/M2 | WEIGHT: 162.56 LBS | RESPIRATION RATE: 18 BRPM | DIASTOLIC BLOOD PRESSURE: 79 MMHG | OXYGEN SATURATION: 97 % | SYSTOLIC BLOOD PRESSURE: 193 MMHG | TEMPERATURE: 98 F

## 2019-03-29 DIAGNOSIS — D12.6 ADENOMATOUS POLYP OF COLON, UNSPECIFIED PART OF COLON: ICD-10-CM

## 2019-03-29 DIAGNOSIS — R13.10 DYSPHAGIA, UNSPECIFIED TYPE: ICD-10-CM

## 2019-03-29 DIAGNOSIS — K22.2 ESOPHAGEAL STRICTURE: ICD-10-CM

## 2019-03-29 DIAGNOSIS — C18.9 MALIGNANT NEOPLASM OF COLON, UNSPECIFIED PART OF COLON (HCC): ICD-10-CM

## 2019-03-29 LAB
ANION GAP SERPL CALCULATED.3IONS-SCNC: 11.1 MMOL/L
BUN BLD-MCNC: 7 MG/DL (ref 8–23)
BUN/CREAT SERPL: 9 (ref 7–25)
CALCIUM SPEC-SCNC: 8.6 MG/DL (ref 8.6–10.5)
CHLORIDE SERPL-SCNC: 95 MMOL/L (ref 98–107)
CO2 SERPL-SCNC: 28.9 MMOL/L (ref 22–29)
CREAT BLD-MCNC: 0.78 MG/DL (ref 0.57–1)
DEPRECATED RDW RBC AUTO: 41.1 FL (ref 37–54)
ERYTHROCYTE [DISTWIDTH] IN BLOOD BY AUTOMATED COUNT: 13.1 % (ref 12.3–15.4)
GFR SERPL CREATININE-BSD FRML MDRD: 70 ML/MIN/1.73
GLUCOSE BLD-MCNC: 126 MG/DL (ref 65–99)
GLUCOSE BLDC GLUCOMTR-MCNC: 109 MG/DL (ref 70–130)
HCT VFR BLD AUTO: 36.9 % (ref 34–46.6)
HGB BLD-MCNC: 12.4 G/DL (ref 12–15.9)
MCH RBC QN AUTO: 29.2 PG (ref 26.6–33)
MCHC RBC AUTO-ENTMCNC: 33.6 G/DL (ref 31.5–35.7)
MCV RBC AUTO: 87 FL (ref 79–97)
PLATELET # BLD AUTO: 148 10*3/MM3 (ref 140–450)
PMV BLD AUTO: 10 FL (ref 6–12)
POTASSIUM BLD-SCNC: 3.9 MMOL/L (ref 3.5–5.2)
RBC # BLD AUTO: 4.24 10*6/MM3 (ref 3.77–5.28)
SODIUM BLD-SCNC: 135 MMOL/L (ref 136–145)
WBC NRBC COR # BLD: 6.18 10*3/MM3 (ref 3.4–10.8)

## 2019-03-29 PROCEDURE — 43239 EGD BIOPSY SINGLE/MULTIPLE: CPT | Performed by: INTERNAL MEDICINE

## 2019-03-29 PROCEDURE — 80048 BASIC METABOLIC PNL TOTAL CA: CPT | Performed by: INTERNAL MEDICINE

## 2019-03-29 PROCEDURE — 82962 GLUCOSE BLOOD TEST: CPT

## 2019-03-29 PROCEDURE — S0260 H&P FOR SURGERY: HCPCS | Performed by: INTERNAL MEDICINE

## 2019-03-29 PROCEDURE — 85027 COMPLETE CBC AUTOMATED: CPT | Performed by: INTERNAL MEDICINE

## 2019-03-29 PROCEDURE — G0121 COLON CA SCRN NOT HI RSK IND: HCPCS | Performed by: INTERNAL MEDICINE

## 2019-03-29 PROCEDURE — 25010000002 SUCCINYLCHOLINE PER 20 MG: Performed by: NURSE ANESTHETIST, CERTIFIED REGISTERED

## 2019-03-29 PROCEDURE — 25010000002 PROPOFOL 10 MG/ML EMULSION: Performed by: NURSE ANESTHETIST, CERTIFIED REGISTERED

## 2019-03-29 PROCEDURE — C1726 CATH, BAL DIL, NON-VASCULAR: HCPCS | Performed by: INTERNAL MEDICINE

## 2019-03-29 PROCEDURE — 87081 CULTURE SCREEN ONLY: CPT | Performed by: INTERNAL MEDICINE

## 2019-03-29 PROCEDURE — 43249 ESOPH EGD DILATION <30 MM: CPT | Performed by: INTERNAL MEDICINE

## 2019-03-29 PROCEDURE — 88305 TISSUE EXAM BY PATHOLOGIST: CPT | Performed by: INTERNAL MEDICINE

## 2019-03-29 PROCEDURE — 25010000002 ONDANSETRON PER 1 MG: Performed by: NURSE ANESTHETIST, CERTIFIED REGISTERED

## 2019-03-29 PROCEDURE — 25010000002 DEXAMETHASONE PER 1 MG: Performed by: NURSE ANESTHETIST, CERTIFIED REGISTERED

## 2019-03-29 RX ORDER — PROMETHAZINE HYDROCHLORIDE 25 MG/ML
12.5 INJECTION, SOLUTION INTRAMUSCULAR; INTRAVENOUS ONCE AS NEEDED
Status: DISCONTINUED | OUTPATIENT
Start: 2019-03-29 | End: 2019-03-29 | Stop reason: HOSPADM

## 2019-03-29 RX ORDER — DIPHENHYDRAMINE HCL 25 MG
25 CAPSULE ORAL
Status: DISCONTINUED | OUTPATIENT
Start: 2019-03-29 | End: 2019-03-29 | Stop reason: HOSPADM

## 2019-03-29 RX ORDER — PROMETHAZINE HYDROCHLORIDE 25 MG/1
25 TABLET ORAL ONCE AS NEEDED
Status: DISCONTINUED | OUTPATIENT
Start: 2019-03-29 | End: 2019-03-29 | Stop reason: HOSPADM

## 2019-03-29 RX ORDER — SODIUM CHLORIDE, SODIUM LACTATE, POTASSIUM CHLORIDE, CALCIUM CHLORIDE 600; 310; 30; 20 MG/100ML; MG/100ML; MG/100ML; MG/100ML
9 INJECTION, SOLUTION INTRAVENOUS CONTINUOUS
Status: DISCONTINUED | OUTPATIENT
Start: 2019-03-29 | End: 2019-03-29 | Stop reason: HOSPADM

## 2019-03-29 RX ORDER — HYDROCODONE BITARTRATE AND ACETAMINOPHEN 7.5; 325 MG/1; MG/1
1 TABLET ORAL ONCE AS NEEDED
Status: DISCONTINUED | OUTPATIENT
Start: 2019-03-29 | End: 2019-03-29 | Stop reason: HOSPADM

## 2019-03-29 RX ORDER — FENTANYL CITRATE 50 UG/ML
100 INJECTION, SOLUTION INTRAMUSCULAR; INTRAVENOUS
Status: DISCONTINUED | OUTPATIENT
Start: 2019-03-29 | End: 2019-03-29 | Stop reason: HOSPADM

## 2019-03-29 RX ORDER — ONDANSETRON 2 MG/ML
INJECTION INTRAMUSCULAR; INTRAVENOUS AS NEEDED
Status: DISCONTINUED | OUTPATIENT
Start: 2019-03-29 | End: 2019-03-29 | Stop reason: SURG

## 2019-03-29 RX ORDER — PROMETHAZINE HYDROCHLORIDE 25 MG/1
25 SUPPOSITORY RECTAL ONCE AS NEEDED
Status: DISCONTINUED | OUTPATIENT
Start: 2019-03-29 | End: 2019-03-29 | Stop reason: HOSPADM

## 2019-03-29 RX ORDER — NALOXONE HCL 0.4 MG/ML
0.2 VIAL (ML) INJECTION AS NEEDED
Status: DISCONTINUED | OUTPATIENT
Start: 2019-03-29 | End: 2019-03-29 | Stop reason: HOSPADM

## 2019-03-29 RX ORDER — DEXAMETHASONE SODIUM PHOSPHATE 10 MG/ML
INJECTION INTRAMUSCULAR; INTRAVENOUS AS NEEDED
Status: DISCONTINUED | OUTPATIENT
Start: 2019-03-29 | End: 2019-03-29 | Stop reason: SURG

## 2019-03-29 RX ORDER — GLYCOPYRROLATE 0.2 MG/ML
INJECTION INTRAMUSCULAR; INTRAVENOUS AS NEEDED
Status: DISCONTINUED | OUTPATIENT
Start: 2019-03-29 | End: 2019-03-29 | Stop reason: SURG

## 2019-03-29 RX ORDER — LIDOCAINE HYDROCHLORIDE 20 MG/ML
INJECTION, SOLUTION INFILTRATION; PERINEURAL AS NEEDED
Status: DISCONTINUED | OUTPATIENT
Start: 2019-03-29 | End: 2019-03-29 | Stop reason: SURG

## 2019-03-29 RX ORDER — ACETAMINOPHEN 325 MG/1
650 TABLET ORAL ONCE AS NEEDED
Status: DISCONTINUED | OUTPATIENT
Start: 2019-03-29 | End: 2019-03-29 | Stop reason: HOSPADM

## 2019-03-29 RX ORDER — HYDRALAZINE HYDROCHLORIDE 20 MG/ML
5 INJECTION INTRAMUSCULAR; INTRAVENOUS
Status: DISCONTINUED | OUTPATIENT
Start: 2019-03-29 | End: 2019-03-29 | Stop reason: HOSPADM

## 2019-03-29 RX ORDER — LIDOCAINE HYDROCHLORIDE 10 MG/ML
0.5 INJECTION, SOLUTION EPIDURAL; INFILTRATION; INTRACAUDAL; PERINEURAL ONCE AS NEEDED
Status: DISCONTINUED | OUTPATIENT
Start: 2019-03-29 | End: 2019-03-29 | Stop reason: HOSPADM

## 2019-03-29 RX ORDER — SODIUM CHLORIDE 0.9 % (FLUSH) 0.9 %
3 SYRINGE (ML) INJECTION EVERY 12 HOURS SCHEDULED
Status: DISCONTINUED | OUTPATIENT
Start: 2019-03-29 | End: 2019-03-29 | Stop reason: HOSPADM

## 2019-03-29 RX ORDER — FAMOTIDINE 10 MG/ML
20 INJECTION, SOLUTION INTRAVENOUS ONCE
Status: COMPLETED | OUTPATIENT
Start: 2019-03-29 | End: 2019-03-29

## 2019-03-29 RX ORDER — FLUMAZENIL 0.1 MG/ML
0.2 INJECTION INTRAVENOUS AS NEEDED
Status: DISCONTINUED | OUTPATIENT
Start: 2019-03-29 | End: 2019-03-29 | Stop reason: HOSPADM

## 2019-03-29 RX ORDER — EPHEDRINE SULFATE 50 MG/ML
5 INJECTION, SOLUTION INTRAVENOUS ONCE AS NEEDED
Status: DISCONTINUED | OUTPATIENT
Start: 2019-03-29 | End: 2019-03-29 | Stop reason: HOSPADM

## 2019-03-29 RX ORDER — ONDANSETRON 2 MG/ML
4 INJECTION INTRAMUSCULAR; INTRAVENOUS ONCE AS NEEDED
Status: DISCONTINUED | OUTPATIENT
Start: 2019-03-29 | End: 2019-03-29 | Stop reason: HOSPADM

## 2019-03-29 RX ORDER — SODIUM CHLORIDE 0.9 % (FLUSH) 0.9 %
1-10 SYRINGE (ML) INJECTION AS NEEDED
Status: DISCONTINUED | OUTPATIENT
Start: 2019-03-29 | End: 2019-03-29 | Stop reason: HOSPADM

## 2019-03-29 RX ORDER — OXYCODONE AND ACETAMINOPHEN 7.5; 325 MG/1; MG/1
1 TABLET ORAL ONCE AS NEEDED
Status: DISCONTINUED | OUTPATIENT
Start: 2019-03-29 | End: 2019-03-29 | Stop reason: HOSPADM

## 2019-03-29 RX ORDER — PROPOFOL 10 MG/ML
VIAL (ML) INTRAVENOUS AS NEEDED
Status: DISCONTINUED | OUTPATIENT
Start: 2019-03-29 | End: 2019-03-29 | Stop reason: SURG

## 2019-03-29 RX ORDER — SUCCINYLCHOLINE CHLORIDE 20 MG/ML
INJECTION INTRAMUSCULAR; INTRAVENOUS AS NEEDED
Status: DISCONTINUED | OUTPATIENT
Start: 2019-03-29 | End: 2019-03-29 | Stop reason: SURG

## 2019-03-29 RX ORDER — HYDROMORPHONE HYDROCHLORIDE 1 MG/ML
0.2 INJECTION, SOLUTION INTRAMUSCULAR; INTRAVENOUS; SUBCUTANEOUS
Status: DISCONTINUED | OUTPATIENT
Start: 2019-03-29 | End: 2019-03-29 | Stop reason: HOSPADM

## 2019-03-29 RX ORDER — ROCURONIUM BROMIDE 10 MG/ML
INJECTION, SOLUTION INTRAVENOUS AS NEEDED
Status: DISCONTINUED | OUTPATIENT
Start: 2019-03-29 | End: 2019-03-29 | Stop reason: SURG

## 2019-03-29 RX ORDER — FENTANYL CITRATE 50 UG/ML
25 INJECTION, SOLUTION INTRAMUSCULAR; INTRAVENOUS
Status: DISCONTINUED | OUTPATIENT
Start: 2019-03-29 | End: 2019-03-29 | Stop reason: HOSPADM

## 2019-03-29 RX ADMIN — SODIUM CHLORIDE, POTASSIUM CHLORIDE, SODIUM LACTATE AND CALCIUM CHLORIDE 9 ML/HR: 600; 310; 30; 20 INJECTION, SOLUTION INTRAVENOUS at 07:14

## 2019-03-29 RX ADMIN — SUCCINYLCHOLINE CHLORIDE 100 MG: 20 INJECTION, SOLUTION INTRAMUSCULAR; INTRAVENOUS; PARENTERAL at 08:27

## 2019-03-29 RX ADMIN — FAMOTIDINE 20 MG: 10 INJECTION INTRAVENOUS at 07:20

## 2019-03-29 RX ADMIN — LIDOCAINE HYDROCHLORIDE 80 MG: 20 INJECTION, SOLUTION INFILTRATION; PERINEURAL at 08:27

## 2019-03-29 RX ADMIN — ROCURONIUM BROMIDE 5 MG: 10 INJECTION INTRAVENOUS at 08:27

## 2019-03-29 RX ADMIN — ONDANSETRON 4 MG: 2 INJECTION INTRAMUSCULAR; INTRAVENOUS at 09:13

## 2019-03-29 RX ADMIN — PROPOFOL 120 MG: 10 INJECTION, EMULSION INTRAVENOUS at 08:27

## 2019-03-29 RX ADMIN — DEXAMETHASONE SODIUM PHOSPHATE 4 MG: 10 INJECTION INTRAMUSCULAR; INTRAVENOUS at 09:08

## 2019-03-29 RX ADMIN — GLYCOPYRROLATE 0.2 MG: 0.2 INJECTION INTRAMUSCULAR; INTRAVENOUS at 08:25

## 2019-03-29 NOTE — ANESTHESIA POSTPROCEDURE EVALUATION
"Patient: Marcelle Johnson    Procedure Summary     Date:  03/29/19 Room / Location:  [facility] OR  /  [facility]    Anesthesia Start:  0823 Anesthesia Stop:  0926    Procedures:       COLONOSCOPY (N/A )      ESOPHAGOGASTRODUODENOSCOPY, WITH COLD BIOPSIES AND DILATION (N/A Esophagus) Diagnosis:       Benign esophageal stricture      Esophagitis      Hiatal hernia      Gastritis      Status post left hemicolectomy      Diverticulosis      Hemorrhoids      (Dysphagia, unspecified type [R13.10])      (Esophageal stricture [K22.2])      (Adenomatous polyp of colon, unspecified part of colon [D12.6])      (Malignant neoplasm of colon, unspecified part of colon (CMS/HCC) [C18.9])    Surgeon:  Dashawn Hopson MD Provider:  Loyd Francis MD    Anesthesia Type:  MAC ASA Status:  3          Anesthesia Type: MAC  Last vitals  BP   (!) 193/79(patient asymptomatic< my bp has been high at [facility] too\") (03/29/19 1030)   Temp   36.7 °C (98 °F) (03/29/19 0945)   Pulse   85 (03/29/19 1030)   Resp   18 (03/29/19 1030)     SpO2   97 % (03/29/19 1030)     Post Anesthesia Care and Evaluation    Patient location during evaluation: bedside  Patient participation: complete - patient participated  Level of consciousness: awake  Pain score: 2  Pain management: adequate  Airway patency: patent  Anesthetic complications: No anesthetic complications  PONV Status: none  Cardiovascular status: acceptable  Respiratory status: acceptable  Hydration status: acceptable    Comments: BP (!) 193/79 Comment: patient asymptomatic< my bp has been high at [facility] too\"  Pulse 85   Temp 36.7 °C (98 °F) (Oral)   Resp 18   Ht 161.3 cm (63.5\")   Wt 73.7 kg (162 lb 9 oz)   SpO2 97%   BMI 28.34 kg/m²         "

## 2019-03-29 NOTE — ANESTHESIA PROCEDURE NOTES
Airway  Urgency: elective    Airway not difficult    General Information and Staff    Patient location during procedure: OR  Anesthesiologist: Loyd Francis MD  CRNA: Jeremi Mejia CRNA    Indications and Patient Condition  Indications for airway management: airway protection    Preoxygenated: yes  MILS maintained throughout  Mask difficulty assessment: 1 - vent by mask    Final Airway Details  Final airway type: endotracheal airway      Successful airway: ETT  Cuffed: yes   Successful intubation technique: direct laryngoscopy  Endotracheal tube insertion site: oral  Blade: Chelsea  Blade size: 3  ETT size (mm): 7.0  Cormack-Lehane Classification: grade I - full view of glottis  Placement verified by: chest auscultation and capnometry   Inital cuff pressure (cm H2O): 22  Cuff volume (mL): 7  Measured from: lips  ETT to lips (cm): 22  Number of attempts at approach: 1

## 2019-03-29 NOTE — ANESTHESIA PREPROCEDURE EVALUATION
Anesthesia Evaluation     Patient summary reviewed and Nursing notes reviewed   NPO Solid Status: > 8 hours             Airway   Mallampati: II  TM distance: >3 FB  Neck ROM: full  no difficulty expected  Dental - normal exam   (+) upper dentures    Pulmonary - negative pulmonary ROS and normal exam   Cardiovascular - normal exam  Exercise tolerance: good (4-7 METS)    Rhythm: regular  Rate: normal    (+) hypertension,     ROS comment: LBBB    Neuro/Psych- negative ROS  GI/Hepatic/Renal/Endo    (+)   diabetes mellitus well controlled, hypothyroidism,     Musculoskeletal     Abdominal  - normal exam   Substance History - negative use     OB/GYN negative ob/gyn ROS         Other   (+) arthritis       Other Comment: BRCA  COLON CA                      Anesthesia Plan    ASA 3     MAC and general     intravenous induction   Anesthetic plan, all risks, benefits, and alternatives have been provided, discussed and informed consent has been obtained with: patient.    Plan discussed with CRNA.

## 2019-03-30 LAB — UREASE TISS QL: NEGATIVE

## 2019-04-01 LAB
CYTO UR: NORMAL
LAB AP CASE REPORT: NORMAL
LAB AP DIAGNOSIS COMMENT: NORMAL
PATH REPORT.FINAL DX SPEC: NORMAL
PATH REPORT.GROSS SPEC: NORMAL

## 2019-04-08 ENCOUNTER — TELEPHONE (OUTPATIENT)
Dept: GASTROENTEROLOGY | Facility: CLINIC | Age: 84
End: 2019-04-08

## 2019-04-08 NOTE — TELEPHONE ENCOUNTER
Call to pt.  Advise per path report that duodenal bx with nonspecific changes.  Stomach body with mild reparative changes.  GE junction unremarkable.  Mid esopaghageal bx with esophagitis.      Advise per Dr Hopson that recommend continue PPI (nexium).  F/u in 3 mo's to discuss status and possible need for further dilation.      Pt verb understanding.  F/u appt scheduled for 72 @ 10am with Dr Hopson.    Message to Dr Hopson.

## 2019-04-08 NOTE — TELEPHONE ENCOUNTER
----- Message from Dashawn GARCIAS MD sent at 4/5/2019  5:23 PM EDT -----  Regarding: Biopsy results  Okay to call results, would continue PPI.  Have patient follow-up in 3 months to discuss status and possible need for further dilation.  ----- Message -----  From: Lab, Background User  Sent: 3/30/2019  10:49 AM  To: Dashawn GARCIAS MD

## 2019-06-03 RX ORDER — TAMOXIFEN CITRATE 20 MG/1
TABLET ORAL
Qty: 90 TABLET | Refills: 1 | Status: SHIPPED | OUTPATIENT
Start: 2019-06-03 | End: 2019-11-10 | Stop reason: SDUPTHER

## 2019-06-12 ENCOUNTER — OFFICE VISIT (OUTPATIENT)
Dept: ONCOLOGY | Facility: CLINIC | Age: 84
End: 2019-06-12

## 2019-06-12 ENCOUNTER — LAB (OUTPATIENT)
Dept: OTHER | Facility: HOSPITAL | Age: 84
End: 2019-06-12

## 2019-06-12 VITALS
RESPIRATION RATE: 16 BRPM | HEIGHT: 64 IN | HEART RATE: 82 BPM | BODY MASS INDEX: 27.71 KG/M2 | TEMPERATURE: 97.9 F | SYSTOLIC BLOOD PRESSURE: 149 MMHG | OXYGEN SATURATION: 97 % | WEIGHT: 162.3 LBS | DIASTOLIC BLOOD PRESSURE: 78 MMHG

## 2019-06-12 DIAGNOSIS — Z12.39 SCREENING BREAST EXAMINATION: ICD-10-CM

## 2019-06-12 DIAGNOSIS — C50.411 MALIGNANT NEOPLASM OF UPPER-OUTER QUADRANT OF RIGHT FEMALE BREAST, UNSPECIFIED ESTROGEN RECEPTOR STATUS (HCC): ICD-10-CM

## 2019-06-12 DIAGNOSIS — M80.00XD OSTEOPOROSIS WITH CURRENT PATHOLOGICAL FRACTURE WITH ROUTINE HEALING, UNSPECIFIED OSTEOPOROSIS TYPE, SUBSEQUENT ENCOUNTER: ICD-10-CM

## 2019-06-12 DIAGNOSIS — M81.0 OSTEOPOROSIS, UNSPECIFIED OSTEOPOROSIS TYPE, UNSPECIFIED PATHOLOGICAL FRACTURE PRESENCE: ICD-10-CM

## 2019-06-12 DIAGNOSIS — C50.411 MALIGNANT NEOPLASM OF UPPER-OUTER QUADRANT OF RIGHT FEMALE BREAST, UNSPECIFIED ESTROGEN RECEPTOR STATUS (HCC): Primary | ICD-10-CM

## 2019-06-12 LAB
ALBUMIN SERPL-MCNC: 3.6 G/DL (ref 3.5–5.2)
ALBUMIN/GLOB SERPL: 1.5 G/DL
ALP SERPL-CCNC: 52 U/L (ref 39–117)
ALT SERPL W P-5'-P-CCNC: 18 U/L (ref 1–33)
ANION GAP SERPL CALCULATED.3IONS-SCNC: 10 MMOL/L
AST SERPL-CCNC: 25 U/L (ref 1–32)
BASOPHILS # BLD AUTO: 0.05 10*3/MM3 (ref 0–0.2)
BASOPHILS NFR BLD AUTO: 0.6 % (ref 0–1.5)
BILIRUB SERPL-MCNC: 0.2 MG/DL (ref 0.1–1.2)
BUN BLD-MCNC: 14 MG/DL (ref 8–23)
BUN/CREAT SERPL: 17.9 (ref 7–25)
CALCIUM SPEC-SCNC: 9.3 MG/DL (ref 8.6–10.5)
CHLORIDE SERPL-SCNC: 99 MMOL/L (ref 98–107)
CO2 SERPL-SCNC: 27 MMOL/L (ref 22–29)
CREAT BLD-MCNC: 0.78 MG/DL (ref 0.57–1)
DEPRECATED RDW RBC AUTO: 40 FL (ref 37–54)
EOSINOPHIL # BLD AUTO: 0.1 10*3/MM3 (ref 0–0.4)
EOSINOPHIL NFR BLD AUTO: 1.2 % (ref 0.3–6.2)
ERYTHROCYTE [DISTWIDTH] IN BLOOD BY AUTOMATED COUNT: 12.7 % (ref 12.3–15.4)
GFR SERPL CREATININE-BSD FRML MDRD: 70 ML/MIN/1.73
GLOBULIN UR ELPH-MCNC: 2.4 GM/DL
GLUCOSE BLD-MCNC: 141 MG/DL (ref 65–99)
HCT VFR BLD AUTO: 35.8 % (ref 34–46.6)
HGB BLD-MCNC: 12.1 G/DL (ref 12–15.9)
IMM GRANULOCYTES # BLD AUTO: 0.02 10*3/MM3 (ref 0–0.05)
IMM GRANULOCYTES NFR BLD AUTO: 0.2 % (ref 0–0.5)
LYMPHOCYTES # BLD AUTO: 3.52 10*3/MM3 (ref 0.7–3.1)
LYMPHOCYTES NFR BLD AUTO: 43 % (ref 19.6–45.3)
MAGNESIUM SERPL-MCNC: 1.8 MG/DL (ref 1.6–2.4)
MCH RBC QN AUTO: 29.3 PG (ref 26.6–33)
MCHC RBC AUTO-ENTMCNC: 33.8 G/DL (ref 31.5–35.7)
MCV RBC AUTO: 86.7 FL (ref 79–97)
MONOCYTES # BLD AUTO: 0.61 10*3/MM3 (ref 0.1–0.9)
MONOCYTES NFR BLD AUTO: 7.4 % (ref 5–12)
NEUTROPHILS # BLD AUTO: 3.89 10*3/MM3 (ref 1.7–7)
NEUTROPHILS NFR BLD AUTO: 47.6 % (ref 42.7–76)
NRBC BLD AUTO-RTO: 0 /100 WBC (ref 0–0.2)
PHOSPHATE SERPL-MCNC: 3 MG/DL (ref 2.5–4.5)
PLATELET # BLD AUTO: 165 10*3/MM3 (ref 140–450)
PMV BLD AUTO: 10.6 FL (ref 6–12)
POTASSIUM BLD-SCNC: 3.7 MMOL/L (ref 3.5–5.2)
PROT SERPL-MCNC: 6 G/DL (ref 6–8.5)
RBC # BLD AUTO: 4.13 10*6/MM3 (ref 3.77–5.28)
SODIUM BLD-SCNC: 136 MMOL/L (ref 136–145)
WBC NRBC COR # BLD: 8.19 10*3/MM3 (ref 3.4–10.8)

## 2019-06-12 PROCEDURE — 84100 ASSAY OF PHOSPHORUS: CPT | Performed by: INTERNAL MEDICINE

## 2019-06-12 PROCEDURE — 85025 COMPLETE CBC W/AUTO DIFF WBC: CPT | Performed by: INTERNAL MEDICINE

## 2019-06-12 PROCEDURE — 99214 OFFICE O/P EST MOD 30 MIN: CPT | Performed by: INTERNAL MEDICINE

## 2019-06-12 PROCEDURE — 83735 ASSAY OF MAGNESIUM: CPT | Performed by: INTERNAL MEDICINE

## 2019-06-12 PROCEDURE — 80053 COMPREHEN METABOLIC PANEL: CPT | Performed by: INTERNAL MEDICINE

## 2019-06-12 PROCEDURE — 36415 COLL VENOUS BLD VENIPUNCTURE: CPT

## 2019-06-12 NOTE — PROGRESS NOTES
"  Subjective .     REASONS FOR FOLLOWUP: 1.  [id number] invasive ductal carcinoma of the right breast, estrogen receptor positive greater than 90%, progesterone receptor 90%, HER-2/breanne 2+ on immunohistochemistry and negative by fish  2.  Osteoporosis  3. arimidex held secondary to arthralgias.  4. ? Side effect to prolia, patient complained of jaw pain secondary to Prolia and refused any bisphosphonates  5.  Arimidex discontinued with resolution of arthralgias.  6.  Patient started on tamoxifen as of July 6, 2017.    HISTORY OF PRESENT ILLNESS:  The patient is a 86 y.o. year old female who is here for follow-up with the above-mentioned history.    History of Present Illness   patient is a 85-year-old female with a above  history currently here for scheduled Prolia. *** She continues to take tamoxifen daily as prescribed.  She did see her dentist several weeks ago who is concerned about a tooth on the upper right side that may need to be pulled.  She cannot say for certain the timeframe for which her dentist wants her to stay off of Prolia.  She said it could be \"any time now\" tooth needs to be pulled.  She currently does not have follow-up, however, with her dentist until approximately 4-5 months.    Ports feeling well with no new questions or concerns today.  Her CBC is stable.  She denies any new symptoms.      Past Medical History:   Diagnosis Date   • Breast cancer of upper-outer quadrant of right female breast (CMS/HCC) 10/13/2016   • Colon cancer (CMS/HCC)     had colon resection   • Diabetes (CMS/HCC)     borderline   • Diverticulosis    • Esophageal stricture     had it stretched via scope   • High cholesterol    • History of bacterial pneumonia     SEVERAL YEARS AGO   • Hypertension    • Hypothyroid    • LBBB (left bundle branch block)    • Sleep trouble        ONCOLOGIC HISTORY:  (History from previous dates can be found in the separate document.)  patient is a 83-year-old female who states that she noticed a " lump in the upper outer quadrant of the right breast over the last 2 months. She thinks it was the size of a dime. She then underwent a mammogram on 8/3/2016 which showed a 1 cm mass in the middle third upper outer quadrant of the right breast. Additionly  there was a small asymmetry in the left breast. Spot compression showed partial resolution of this area in the left breast. This measures 0.8 cm. No evidence of axillary adenopathy seen.     Ultrasound was done which shows at 10 o'clock position in the right breast there is a 0.9 cm irregular heterogeneous mass. But no abnormality is seen in the left breast. A six-month follow-up on the left breast is suggested.     Patient then underwent ultrasound-guided biopsy on 8/17/2016. Allergies shows invasive mammary carcinoma ductal type with focal lobular features in the right breast, Arden score of 5 out of 9. There is no angiolymphatic invasion seen. Maximum dimension of invasive carcinoma in 7 mm. No carcinoma in situ identified. Estrogen receptors were greater than 90%, progesterone receptor is 90%, HER-2/breanne was 2+ on immunohistochemistry and negative by fish.     MRI of the breast shows middle third upper quadrant of the right breast at 10 o'clock position the biopsy cavity measured 1.1 cm from superior to inferior dimension 1.3 cm in anterior to posterior dimension and 2.1 cm from medial to lateral dimension. There were no other areas of enhancement in the left breast.  Chest x-ray no acute infiltrate.     Patient underwent lumpectomy on the right side with sentinel lymph node biopsy by Dr. Lee on 9/19/2016. Patient had invasive carcinoma 1.2 cm with lobular features. The overall grade is grade 2 with her Nadira score of 6 out of 9. He had focal ductal carcinoma in situ and focal atypical lobular hyperplasia as well. The ductal carcinoma in situ was less than 1 mm, cribriform pattern and low nuclear grade grade 1. The margins were indeterminate for  invasive carcinoma but uninvolved by DCIS. Patient has had reexcised margins and invasive carcinoma is present only in the reexcised anterior margin. The tumor focally extends to within 1 mm of the reexcised anterior margin. The fibroinflammatory biopsy skin changes extend to the anterior margin. I will need to discuss with the pathologist about the margins. She has extends to within 3 mm was to margins and to within 5 mm in the inferior margin. The number of sentinel lymph nodes examined is to and there is no evidence of my or macro metastasis and no isolated tumor cells.     Patient now has been referred to radiation oncology and us for further options of treatment.  Given her age, her low stage, her strong estrogen and progesterone receptor positivity Dr. Nolasco felt that she does not need to undergo radiation.    Patient's bone density done shows osteoporosis.  Discussion done about consideration of tamoxifen versus Arimidex with prolia.  Patient refuses tamoxifen and is willing to consider Arimidex.  We did discuss that it'll be important for her to take prolia , calcium and vitamin D.  Pt could not tolerate prolia .    Pt on arimidex  and likely change to tamoxifen. Due to side effects    Arimidex discontinued secondary to severe arthralgias.  Tamoxifen started as of July 6, 2017.    .  Past medical history: Patient was diagnosed with the colon cancer back in 1989 and at the same time she had her ovaries removed and appendix and gallbladder removed. She didn't require any chemotherapy at the time.  Patient had pneumonia back in 1995.  Her hypertension  High cholesterol  Hypothyroidism  Patient has had colonoscopy 5 years ago by Dr. Tanner and apparently had a polyp she was to return to him for another colonoscopy in 5 years.  Patient had esophageal stricture as a result she underwent dilation by Dr. Hopson last year.  She underwent parathyroidectomy.  She has borderline diabetes. On diet control     OB/GYN  history she started metastases. At age 12, had menopause at age 50 when she underwent oophorectomy. She is  2 para 2 no miscarriages she has 2 children one is 54 and second one is 51-year-old and one grandchild.       Current Outpatient Medications on File Prior to Visit   Medication Sig Dispense Refill   • aspirin 81 MG tablet Take 81 mg by mouth Daily. HOLDING FOR LAST WEEK     • Calcium Citrate-Vitamin D (CALCIUM + D PO) Take 1,000 mg by mouth daily.     • cetirizine (zyrTEC) 10 MG tablet Take 10 mg by mouth Daily.     • Esomeprazole Magnesium (NEXIUM PO) Take 22.3 mg by mouth every morning.     • FIBER PO Take 2 capsules by mouth daily.     • levothyroxine (SYNTHROID, LEVOTHROID) 100 MCG tablet Take 1 tablet by mouth Daily. 90 tablet 2   • losartan-hydrochlorothiazide (HYZAAR) 100-12.5 MG per tablet Take 1 tablet by mouth Daily. 90 tablet 2   • pravastatin (PRAVACHOL) 40 MG tablet Take 1 tablet by mouth Every Night. 90 tablet 2   • tamoxifen (NOLVADEX) 20 MG chemo tablet Take 20 mg by mouth Daily.     • tamoxifen (NOLVADEX) 20 MG chemo tablet TAKE 1 TABLET BY MOUTH EVERY DAY 90 tablet 1   • vitamin B-6 (PYRIDOXINE) 50 MG tablet Take 50 mg by mouth Daily.     • vitamin C (ASCORBIC ACID) 500 MG tablet Take 500 mg by mouth Daily As Needed.       No current facility-administered medications on file prior to visit.        ALLERGIES:     Allergies   Allergen Reactions   • Morphine And Related Nausea And Vomiting       Social History     Socioeconomic History   • Marital status:      Spouse name: Not on file   • Number of children: 2   • Years of education: High school   • Highest education level: Not on file   Occupational History     Employer: RETIRED   Tobacco Use   • Smoking status: Former Smoker     Years: 5.00     Types: Cigarettes     Last attempt to quit: 1966     Years since quittin.4   • Smokeless tobacco: Never Used   Substance and Sexual Activity   • Alcohol use: No   • Drug use: No   •  Sexual activity: Defer         Cancer-related family history is negative for Breast cancer, Colon cancer, Endometrial cancer, and Ovarian cancer.     Review of Systems   Constitutional: Negative for appetite change, chills, diaphoresis, fatigue, fever and unexpected weight change.   HENT: Positive for dental problem. Negative for hearing loss, sore throat and trouble swallowing.         See HPI   Respiratory: Negative for cough, chest tightness, shortness of breath and wheezing.    Cardiovascular: Negative for chest pain, palpitations and leg swelling.   Gastrointestinal: Negative for abdominal distention, abdominal pain, constipation, diarrhea, nausea and vomiting.   Genitourinary: Negative for dysuria, frequency, hematuria and urgency.   Musculoskeletal: Negative for joint swelling.        No muscle weakness.   Skin: Negative for rash and wound.   Neurological: Negative for seizures, syncope, speech difficulty, weakness, numbness and headaches.   Hematological: Negative for adenopathy. Does not bruise/bleed easily.   Psychiatric/Behavioral: Negative for behavioral problems, confusion and suicidal ideas.       Objective      There were no vitals filed for this visit.  Current Status 12/5/2018   ECOG score 0       Physical Exam      GENERAL:  Well-developed, well-nourished in no acute distress.   SKIN:  Warm, dry without rashes, purpura or petechiae.  NECK:  Supple with good range of motion; no thyromegaly or masses, no JVD.  LYMPHATICS:  No cervical, supraclavicular, axillary or inguinal adenopathy.  CHEST:  Lungs clear to auscultation. Good airflow.  CARDIAC:  Regular rate and rhythm without murmurs, rubs or gallops. Normal S1,S2.  ABDOMEN:  Soft, nontender with no hepatosplenomegaly or masses.  EXTREMITIES:  No clubbing, cyanosis or edema.  NEUROLOGICAL:  Cranial Nerves II-XII grossly intact.  No focal neurological deficits.  PSYCHIATRIC:  Normal affect and mood.        RECENT LABS:  Hematology WBC   Date Value  "Ref Range Status   03/29/2019 6.18 3.40 - 10.80 10*3/mm3 Final     RBC   Date Value Ref Range Status   03/29/2019 4.24 3.77 - 5.28 10*6/mm3 Final   02/25/2016 4.63  Final     Hemoglobin   Date Value Ref Range Status   03/29/2019 12.4 12.0 - 15.9 g/dL Final     Hematocrit   Date Value Ref Range Status   03/29/2019 36.9 34.0 - 46.6 % Final     Platelets   Date Value Ref Range Status   03/29/2019 148 140 - 450 10*3/mm3 Final        Assessment/Plan        1. T1 cN0 M0 invasive ductal carcinoma of the right breast, with lobular features, ER positive at 90%, ID positive at 90%, HER-2/breanne 2+ by minimal histochemistry and negative by fish with a HER-2/CEP ratio of 1.1.    She is on tamoxifen daily and tolerating well.    2. Osteoporosis:  She is on  calcium 1200 mg once daily, vitamin D 1000 international units daily as well.  Patient scheduled for pro Yue today, however, reports that her dentist is not comfortable with her receiving Prolia  as she has a tooth that \"needs to come out soon.\"  · Prolia ***      Plan  1. Continue tamoxifen  2. She will return in 6 months as scheduled by Dr. Galicia  She will be scheduled for Prolia this day. She has agreed to discuss treatment with her dentist again in the upcoming months.  I have instructed the patient to call the office should she want to schedule her Prolia sooner than 6 months from now.  3. We reviewed her mammogram results today performed on  10/31/2018 - with negative results.     I, Freya Lama, acted as scribe for Maria M Galicia MD  in documenting the service or procedure. To the best of my knowledge, I recorded what was dictated by MD Maria M Diaz MD      Cc: Dr. James Lee                        "

## 2019-06-12 NOTE — PROGRESS NOTES
Subjective .    REASONS FOR FOLLOWUP: 1.  [id number] invasive ductal carcinoma of the right breast, estrogen receptor positive greater than 90%, progesterone receptor 90%, HER-2/breanne 2+ on immunohistochemistry and negative by fish  2.  Osteoporosis  3. arimidex held secondary to arthralgias.  4. Side effect to prolia, patient complained of jaw pain secondary to Prolia and refused any bisphosphonates  5.  Arimidex discontinued with resolution of arthralgias.  6.  Patient started on tamoxifen as of July 6, 2017.    HISTORY OF PRESENT ILLNESS:  The patient is a 86 y.o. year old female who is here for follow-up with the above-mentioned history.    History of Present Illness   patient is a 85-year-old female with a above history currently here for scheduled Prolia. She continues to take tamoxifen daily as prescribed with good tolerance.     Patient reports feeling well overall but notes some weight loss. She denies hot flashes and swelling of the feet. Patient notes that her gums are red and sore. Her dentist recommended that she should not take Prolia.    Given that she has issues with her gums we will not start Prolia for her osteoporosis.  We discussed about Fosamax but patient does not want to take it.     Past Medical History:   Diagnosis Date   • Breast cancer of upper-outer quadrant of right female breast (CMS/HCC) 10/13/2016   • Colon cancer (CMS/HCC)     had colon resection   • Diabetes (CMS/HCC)     borderline   • Diverticulosis    • Esophageal stricture     had it stretched via scope   • High cholesterol    • History of bacterial pneumonia     SEVERAL YEARS AGO   • Hypertension    • Hypothyroid    • LBBB (left bundle branch block)    • Sleep trouble        ONCOLOGIC HISTORY:  (History from previous dates can be found in the separate document.)  patient is a 83-year-old female who states that she noticed a lump in the upper outer quadrant of the right breast over the last 2 months. She thinks it was the size of a  megganmartine. She then underwent a mammogram on 8/3/2016 which showed a 1 cm mass in the middle third upper outer quadrant of the right breast. Additionly  there was a small asymmetry in the left breast. Spot compression showed partial resolution of this area in the left breast. This measures 0.8 cm. No evidence of axillary adenopathy seen.     Ultrasound was done which shows at 10 o'clock position in the right breast there is a 0.9 cm irregular heterogeneous mass. But no abnormality is seen in the left breast. A six-month follow-up on the left breast is suggested.     Patient then underwent ultrasound-guided biopsy on 8/17/2016. Allergies shows invasive mammary carcinoma ductal type with focal lobular features in the right breast, Mableton score of 5 out of 9. There is no angiolymphatic invasion seen. Maximum dimension of invasive carcinoma in 7 mm. No carcinoma in situ identified. Estrogen receptors were greater than 90%, progesterone receptor is 90%, HER-2/breanne was 2+ on immunohistochemistry and negative by fish.     MRI of the breast shows middle third upper quadrant of the right breast at 10 o'clock position the biopsy cavity measured 1.1 cm from superior to inferior dimension 1.3 cm in anterior to posterior dimension and 2.1 cm from medial to lateral dimension. There were no other areas of enhancement in the left breast.  Chest x-ray no acute infiltrate.     Patient underwent lumpectomy on the right side with sentinel lymph node biopsy by Dr. Lee on 9/19/2016. Patient had invasive carcinoma 1.2 cm with lobular features. The overall grade is grade 2 with her Nadira score of 6 out of 9. He had focal ductal carcinoma in situ and focal atypical lobular hyperplasia as well. The ductal carcinoma in situ was less than 1 mm, cribriform pattern and low nuclear grade grade 1. The margins were indeterminate for invasive carcinoma but uninvolved by DCIS. Patient has had reexcised margins and invasive carcinoma is  present only in the reexcised anterior margin. The tumor focally extends to within 1 mm of the reexcised anterior margin. The fibroinflammatory biopsy skin changes extend to the anterior margin. I will need to discuss with the pathologist about the margins. She has extends to within 3 mm was to margins and to within 5 mm in the inferior margin. The number of sentinel lymph nodes examined is to and there is no evidence of my or macro metastasis and no isolated tumor cells.     Patient now has been referred to radiation oncology and us for further options of treatment.  Given her age, her low stage, her strong estrogen and progesterone receptor positivity Dr. Nolasco felt that she does not need to undergo radiation.    Patient's bone density done shows osteoporosis.  Discussion done about consideration of tamoxifen versus Arimidex with prolia.  Patient refuses tamoxifen and is willing to consider Arimidex.  We did discuss that it'll be important for her to take prolia , calcium and vitamin D.  Pt could not tolerate prolia. Prolia stopped on 06/12/2019    Pt on arimidex  and likely change to tamoxifen. Due to side effects    Arimidex discontinued secondary to severe arthralgias.  Tamoxifen started as of July 6, 2017.    .  Past medical history: Patient was diagnosed with the colon cancer back in 1989 and at the same time she had her ovaries removed and appendix and gallbladder removed. She didn't require any chemotherapy at the time.  Patient had pneumonia back in 1995.  Her hypertension  High cholesterol  Hypothyroidism  Patient has had colonoscopy 5 years ago by Dr. Tanner and apparently had a polyp she was to return to him for another colonoscopy in 5 years.  Patient had esophageal stricture as a result she underwent dilation by Dr. Hopson last year.  She underwent parathyroidectomy.  She has borderline diabetes. On diet control     OB/GYN history she started metastases. At age 12, had menopause at age 50 when  she underwent oophorectomy. She is  2 para 2 no miscarriages she has 2 children one is 54 and second one is 51-year-old and one grandchild.       Current Outpatient Medications on File Prior to Visit   Medication Sig Dispense Refill   • Calcium Citrate-Vitamin D (CALCIUM + D PO) Take 1,000 mg by mouth daily.     • Esomeprazole Magnesium (NEXIUM PO) Take 22.3 mg by mouth every morning.     • FIBER PO Take 2 capsules by mouth daily.     • levothyroxine (SYNTHROID, LEVOTHROID) 100 MCG tablet Take 1 tablet by mouth Daily. 90 tablet 2   • losartan-hydrochlorothiazide (HYZAAR) 100-12.5 MG per tablet Take 1 tablet by mouth Daily. 90 tablet 2   • pravastatin (PRAVACHOL) 40 MG tablet Take 1 tablet by mouth Every Night. 90 tablet 2   • tamoxifen (NOLVADEX) 20 MG chemo tablet TAKE 1 TABLET BY MOUTH EVERY DAY 90 tablet 1   • vitamin B-6 (PYRIDOXINE) 50 MG tablet Take 50 mg by mouth Daily.     • vitamin C (ASCORBIC ACID) 500 MG tablet Take 500 mg by mouth Daily As Needed.     • aspirin 81 MG tablet Take 81 mg by mouth Daily. HOLDING FOR LAST WEEK     • cetirizine (zyrTEC) 10 MG tablet Take 10 mg by mouth Daily.     • tamoxifen (NOLVADEX) 20 MG chemo tablet Take 20 mg by mouth Daily.       No current facility-administered medications on file prior to visit.        ALLERGIES:     Allergies   Allergen Reactions   • Morphine And Related Nausea And Vomiting       Social History     Socioeconomic History   • Marital status:      Spouse name: Not on file   • Number of children: 2   • Years of education: High school   • Highest education level: Not on file   Occupational History     Employer: RETIRED   Tobacco Use   • Smoking status: Former Smoker     Years: 5.00     Types: Cigarettes     Last attempt to quit: 1966     Years since quittin.4   • Smokeless tobacco: Never Used   Substance and Sexual Activity   • Alcohol use: No   • Drug use: No   • Sexual activity: Defer         Cancer-related family history is negative  "for Breast cancer, Colon cancer, Endometrial cancer, and Ovarian cancer.     Review of Systems   Constitutional: Negative for appetite change, chills, diaphoresis, fatigue, fever and unexpected weight change.   HENT: Positive for dental problem (06/12/19-Sore on gums). Negative for hearing loss, sore throat and trouble swallowing.         See HPI   Respiratory: Negative for cough, chest tightness, shortness of breath and wheezing.    Cardiovascular: Negative for chest pain, palpitations and leg swelling.   Gastrointestinal: Negative for abdominal distention, abdominal pain, constipation, diarrhea, nausea and vomiting.   Genitourinary: Negative for dysuria, frequency, hematuria and urgency.   Musculoskeletal: Negative for joint swelling.        No muscle weakness.   Skin: Negative for rash and wound.   Neurological: Negative for seizures, syncope, speech difficulty, weakness, numbness and headaches.   Hematological: Negative for adenopathy. Does not bruise/bleed easily.   Psychiatric/Behavioral: Negative for behavioral problems, confusion and suicidal ideas.         Objective      Vitals:    06/12/19 1334   BP: 149/78   Pulse: 82   Resp: 16   Temp: 97.9 °F (36.6 °C)   TempSrc: Oral   SpO2: 97%   Weight: 73.6 kg (162 lb 4.8 oz)   Height: 161.3 cm (63.5\")   PainSc: 0-No pain     Current Status 12/5/2018   ECOG score 0         GENERAL:  Well-developed, well-nourished in no acute distress.   SKIN:  Warm, dry without rashes, purpura or petechiae.  NECK:  Supple with good range of motion; no thyromegaly or masses, no JVD.  LYMPHATICS:  No cervical, supraclavicular, axillary or inguinal adenopathy.  CHEST:  Lungs clear to auscultation. Good airflow.  BREAST: Right breast: No skin changes, no evidence of breast mass, no nipple discharge, no evidence of any right axillary adenopathy or right supraclavicular adenopathy  Left breast: No evidence of any skin changes, no evidence of any left breast mass and no evidence of left " nipple discharge as well as no left axillary adenopathy or left supraclavicular adenopathy.  CARDIAC:  Regular rate and rhythm without murmurs, rubs or gallops. Normal S1,S2.  ABDOMEN:  Soft, nontender with no hepatosplenomegaly or masses.  EXTREMITIES:  No clubbing, cyanosis or edema.  NEUROLOGICAL:  Cranial Nerves II-XII grossly intact.  No focal neurological deficits.  PSYCHIATRIC:  Normal affect and mood.          RECENT LABS:  Hematology WBC   Date Value Ref Range Status   06/12/2019 8.19 3.40 - 10.80 10*3/mm3 Final     RBC   Date Value Ref Range Status   06/12/2019 4.13 3.77 - 5.28 10*6/mm3 Final   02/25/2016 4.63  Final     Hemoglobin   Date Value Ref Range Status   06/12/2019 12.1 12.0 - 15.9 g/dL Final     Hematocrit   Date Value Ref Range Status   06/12/2019 35.8 34.0 - 46.6 % Final     Platelets   Date Value Ref Range Status   06/12/2019 165 140 - 450 10*3/mm3 Final        Assessment/Plan        1. T1 cN0 M0 invasive ductal carcinoma of the right breast, with lobular features, ER positive at 90%, OK positive at 90%, HER-2/breanne 2+ by minimal histochemistry and negative by fish with a HER-2/CEP ratio of 1.1.    · She is on tamoxifen daily and tolerating well.      2. Osteoporosis:  She is on calcium 1200 mg once daily, vitamin D 1000 international units daily as well.  Patient will not be getting Prolia from now on due to issues with her gums.     3. Benign appearing esophageal stenosis: dilated by Dr. Hopson on 03/29/2019    4. Colonoscopy: done on 03/29/2019 was benign done by Dr. Hopson.    Plan  1. Continue tamoxifen  2.  We reviewed her colonoscopy and EGD results today .   3. Ordered screening mammogram today to be done in November, 2019.  4.  Follow-up with me in 6 months with labs   5. No plans on giving Prolia.  Discussed Fosamax.  Patient refused.      I, Lyssa Seymour, acted as scribe for Maria M Galicia MD  in documenting the service or procedure. To the best of my knowledge, I recorded what  was dictated by MD Maria M Diaz MD      Cc: Dr. James Lee

## 2019-07-02 ENCOUNTER — OFFICE VISIT (OUTPATIENT)
Dept: GASTROENTEROLOGY | Facility: CLINIC | Age: 84
End: 2019-07-02

## 2019-07-02 VITALS
DIASTOLIC BLOOD PRESSURE: 72 MMHG | SYSTOLIC BLOOD PRESSURE: 142 MMHG | HEIGHT: 64 IN | WEIGHT: 163.4 LBS | TEMPERATURE: 98.3 F | BODY MASS INDEX: 27.9 KG/M2

## 2019-07-02 DIAGNOSIS — C18.9 MALIGNANT NEOPLASM OF COLON, UNSPECIFIED PART OF COLON (HCC): ICD-10-CM

## 2019-07-02 DIAGNOSIS — D12.6 ADENOMATOUS POLYP OF COLON, UNSPECIFIED PART OF COLON: ICD-10-CM

## 2019-07-02 DIAGNOSIS — R13.10 DYSPHAGIA, UNSPECIFIED TYPE: Primary | ICD-10-CM

## 2019-07-02 PROCEDURE — 99213 OFFICE O/P EST LOW 20 MIN: CPT | Performed by: INTERNAL MEDICINE

## 2019-07-02 NOTE — PROGRESS NOTES
Chief Complaint   Patient presents with   • Difficulty Swallowing     improved         Marcelle Johnson is a  86 y.o. female here for a follow up visit for dysphagia, history of colon cancer, history of polyps    HPI this 86-year-old female patient of Dr. Jorge Luis Weinberg returns in follow-up since she had undergone upper and lower endoscopy March of this year.  Upper endoscopy was performed because of dysphagia and she had proximal esophageal stricture that was dilated to 13.5 mm.  She has had dramatic improvement of her swallowing both food and pills.  She is currently on Nexium alone.  We talked about further need for dilation if her symptoms return and she will contact this office as needed for that purpose.  A colonoscopy was performed because of her prior history of colon cancer and polyps.  That study was negative except for diverticulosis and hemorrhoids.  She does not warrant further colonoscopy unless conditions change.  She will follow-up here annually but call sooner for any further GI related complaints.    Past Medical History:   Diagnosis Date   • Breast cancer of upper-outer quadrant of right female breast (CMS/HCC) 10/13/2016   • Colon cancer (CMS/HCC)     had colon resection   • Diabetes (CMS/HCC)     borderline   • Diverticulosis    • Esophageal stricture     had it stretched via scope   • High cholesterol    • History of bacterial pneumonia     SEVERAL YEARS AGO   • Hypertension    • Hypothyroid    • LBBB (left bundle branch block)    • Sleep trouble        Current Outpatient Medications   Medication Sig Dispense Refill   • Calcium Citrate-Vitamin D (CALCIUM + D PO) Take 1,000 mg by mouth daily.     • Esomeprazole Magnesium (NEXIUM PO) Take 22.3 mg by mouth every morning.     • FIBER PO Take 2 capsules by mouth daily.     • levothyroxine (SYNTHROID, LEVOTHROID) 100 MCG tablet Take 1 tablet by mouth Daily. 90 tablet 2   • losartan-hydrochlorothiazide (HYZAAR) 100-12.5 MG per tablet Take 1 tablet by  mouth Daily. 90 tablet 2   • Multiple Vitamins-Minerals (MULTIVITAMIN PO) Take  by mouth.     • pravastatin (PRAVACHOL) 40 MG tablet Take 1 tablet by mouth Every Night. 90 tablet 2   • tamoxifen (NOLVADEX) 20 MG chemo tablet TAKE 1 TABLET BY MOUTH EVERY DAY 90 tablet 1   • vitamin B-6 (PYRIDOXINE) 50 MG tablet Take 50 mg by mouth Daily.     • vitamin C (ASCORBIC ACID) 500 MG tablet Take 500 mg by mouth Daily As Needed.       No current facility-administered medications for this visit.        PRN Meds:.    Allergies   Allergen Reactions   • Morphine And Related Nausea And Vomiting       Social History     Socioeconomic History   • Marital status:      Spouse name: Not on file   • Number of children: 2   • Years of education: High school   • Highest education level: Not on file   Occupational History     Employer: RETIRED   Tobacco Use   • Smoking status: Former Smoker     Years: 5.00     Types: Cigarettes     Last attempt to quit: 1966     Years since quittin.5   • Smokeless tobacco: Never Used   Substance and Sexual Activity   • Alcohol use: No   • Drug use: No   • Sexual activity: Defer       Family History   Problem Relation Age of Onset   • Diabetes Mother    • No Known Problems Father    • No Known Problems Sister    • No Known Problems Brother    • No Known Problems Daughter    • No Known Problems Son    • No Known Problems Maternal Grandmother    • No Known Problems Paternal Grandmother    • No Known Problems Maternal Aunt    • No Known Problems Paternal Aunt    • BRCA 1/2 Neg Hx    • Breast cancer Neg Hx    • Colon cancer Neg Hx    • Endometrial cancer Neg Hx    • Ovarian cancer Neg Hx    • Malig Hyperthermia Neg Hx        Review of Systems   Constitutional: Negative for activity change, appetite change, fatigue and unexpected weight change.   HENT: Negative for congestion, facial swelling, sore throat, trouble swallowing and voice change.    Eyes: Negative for photophobia and visual disturbance.    Respiratory: Negative for cough and choking.    Cardiovascular: Negative for chest pain.   Gastrointestinal: Negative for abdominal distention, abdominal pain, anal bleeding, blood in stool, constipation, diarrhea, nausea, rectal pain and vomiting.   Endocrine: Negative for polyphagia.   Musculoskeletal: Negative for arthralgias, gait problem and joint swelling.   Skin: Negative for color change, pallor and rash.   Allergic/Immunologic: Negative for food allergies.   Neurological: Negative for speech difficulty and headaches.   Hematological: Does not bruise/bleed easily.   Psychiatric/Behavioral: Negative for agitation, confusion and sleep disturbance.       Vitals:    07/02/19 1004   BP: 142/72   Temp: 98.3 °F (36.8 °C)       Physical Exam   Constitutional: She is oriented to person, place, and time. She appears well-developed and well-nourished.   HENT:   Head: Normocephalic.   Mouth/Throat: Oropharynx is clear and moist.   Eyes: Conjunctivae and EOM are normal.   Neck: Normal range of motion.   Cardiovascular: Normal rate and regular rhythm.   Pulmonary/Chest: Breath sounds normal.   Abdominal: Soft. Bowel sounds are normal.   Musculoskeletal: Normal range of motion.   Neurological: She is alert and oriented to person, place, and time.   Skin: Skin is warm and dry.   Psychiatric: She has a normal mood and affect. Her behavior is normal.       ASSESSMENT  #1 dysphagia: Proximal esophageal stricture dilated to 13.5 mm, tolerating p.o. Well  #2 history of colon cancer  #3 history of colon polyps      PLAN  Continue current medical regimen  Follow Up annually, call sooner as needed.      ICD-10-CM ICD-9-CM   1. Dysphagia, unspecified type R13.10 787.20   2. Malignant neoplasm of colon, unspecified part of colon (CMS/HCC) C18.9 153.9   3. Adenomatous polyp of colon, unspecified part of colon D12.6 211.3

## 2019-08-27 ENCOUNTER — OFFICE VISIT (OUTPATIENT)
Dept: INTERNAL MEDICINE | Facility: CLINIC | Age: 84
End: 2019-08-27

## 2019-08-27 ENCOUNTER — HOSPITAL ENCOUNTER (OUTPATIENT)
Dept: GENERAL RADIOLOGY | Facility: HOSPITAL | Age: 84
Discharge: HOME | End: 2019-08-27
Admitting: FAMILY MEDICINE

## 2019-08-27 VITALS
WEIGHT: 160.6 LBS | SYSTOLIC BLOOD PRESSURE: 148 MMHG | HEART RATE: 89 BPM | TEMPERATURE: 98.1 F | OXYGEN SATURATION: 97 % | DIASTOLIC BLOOD PRESSURE: 60 MMHG | BODY MASS INDEX: 28.45 KG/M2

## 2019-08-27 DIAGNOSIS — R05.9 COUGH: Primary | ICD-10-CM

## 2019-08-27 PROCEDURE — 99213 OFFICE O/P EST LOW 20 MIN: CPT | Performed by: FAMILY MEDICINE

## 2019-08-27 PROCEDURE — 71046 X-RAY EXAM CHEST 2 VIEWS: CPT

## 2019-08-27 NOTE — PROGRESS NOTES
Marcelle Johnson is a 86 y.o. female.      Assessment/Plan   Problem List Items Addressed This Visit     None      Visit Diagnoses     Cough    -  Primary    Relevant Orders    XR Chest 2 View          trace for cough follow-up results of chest x-ray    Return if symptoms worsen or fail to improve, for Recheck, Next scheduled follow up.      Chief Complaint   Patient presents with   • [facility] follow up to bronchitis     Social History     Tobacco Use   • Smoking status: Former Smoker     Years: 5.00     Types: Cigarettes     Last attempt to quit: 1966     Years since quittin.6   • Smokeless tobacco: Never Used   Substance Use Topics   • Alcohol use: No   • Drug use: No       History of Present Illness   Patient with cough for the last 2 weeks seen in urgent care and given Augmentin prednisone and inhaler minimal improvement although less achy but still persistent cough that is productive of whitish sputum no sweats or chills cough is worse in the evening  The following portions of the patient's history were reviewed and updated as appropriate:PMHroutine: Social history , Allergies, Current Medications, Active Problem List and Health Maintenance    Review of Systems   Constitutional: Negative.    HENT: Negative.    Respiratory: Positive for cough.    Cardiovascular: Negative.    Gastrointestinal: Negative.        Objective   Vitals:    19 1332   BP: 148/60   BP Location: Right arm   Patient Position: Sitting   Cuff Size: Adult   Pulse: 89   Temp: 98.1 °F (36.7 °C)   TempSrc: Oral   SpO2: 97%   Weight: 72.8 kg (160 lb 9.6 oz)     Body mass index is 28.45 kg/m².  Physical Exam   Constitutional: She appears well-developed and well-nourished.   HENT:   Head: Normocephalic and atraumatic.   Mouth/Throat: Oropharynx is clear and moist.   Eyes: No scleral icterus.   Cardiovascular: Normal rate and regular rhythm.   Pulmonary/Chest: Effort normal and breath sounds normal.   Musculoskeletal:  She exhibits no edema.   Psychiatric: She has a normal mood and affect. Her behavior is normal. Judgment and thought content normal.   Nursing note and vitals reviewed.    Reviewed Data:  No visits with results within 1 Month(s) from this visit.   Latest known visit with results is:   Lab on 06/12/2019   Component Date Value Ref Range Status   • Glucose 06/12/2019 141* 65 - 99 mg/dL Final   • BUN 06/12/2019 14  8 - 23 mg/dL Final   • Creatinine 06/12/2019 0.78  0.57 - 1.00 mg/dL Final   • Sodium 06/12/2019 136  136 - 145 mmol/L Final   • Potassium 06/12/2019 3.7  3.5 - 5.2 mmol/L Final   • Chloride 06/12/2019 99  98 - 107 mmol/L Final   • CO2 06/12/2019 27.0  22.0 - 29.0 mmol/L Final   • Calcium 06/12/2019 9.3  8.6 - 10.5 mg/dL Final   • Total Protein 06/12/2019 6.0  6.0 - 8.5 g/dL Final   • Albumin 06/12/2019 3.60  3.50 - 5.20 g/dL Final   • ALT (SGPT) 06/12/2019 18  1 - 33 U/L Final   • AST (SGOT) 06/12/2019 25  1 - 32 U/L Final   • Alkaline Phosphatase 06/12/2019 52  39 - 117 U/L Final   • Total Bilirubin 06/12/2019 0.2  0.1 - 1.2 mg/dL Final   • eGFR Non African Amer 06/12/2019 70  >60 mL/min/1.73 Final   • Globulin 06/12/2019 2.4  gm/dL Final   • A/G Ratio 06/12/2019 1.5  g/dL Final   • BUN/Creatinine Ratio 06/12/2019 17.9  7.0 - 25.0 Final   • Anion Gap 06/12/2019 10.0  mmol/L Final   • Magnesium 06/12/2019 1.8  1.6 - 2.4 mg/dL Final   • Phosphorus 06/12/2019 3.0  2.5 - 4.5 mg/dL Final   • WBC 06/12/2019 8.19  3.40 - 10.80 10*3/mm3 Final   • RBC 06/12/2019 4.13  3.77 - 5.28 10*6/mm3 Final   • Hemoglobin 06/12/2019 12.1  12.0 - 15.9 g/dL Final   • Hematocrit 06/12/2019 35.8  34.0 - 46.6 % Final   • MCV 06/12/2019 86.7  79.0 - 97.0 fL Final   • MCH 06/12/2019 29.3  26.6 - 33.0 pg Final   • MCHC 06/12/2019 33.8  31.5 - 35.7 g/dL Final   • RDW 06/12/2019 12.7  12.3 - 15.4 % Final   • RDW-SD 06/12/2019 40.0  37.0 - 54.0 fl Final   • MPV 06/12/2019 10.6  6.0 - 12.0 fL Final   • Platelets 06/12/2019 165  140 - 450  10*3/mm3 Final   • Neutrophil % 06/12/2019 47.6  42.7 - 76.0 % Final   • Lymphocyte % 06/12/2019 43.0  19.6 - 45.3 % Final   • Monocyte % 06/12/2019 7.4  5.0 - 12.0 % Final   • Eosinophil % 06/12/2019 1.2  0.3 - 6.2 % Final   • Basophil % 06/12/2019 0.6  0.0 - 1.5 % Final   • Immature Grans % 06/12/2019 0.2  0.0 - 0.5 % Final   • Neutrophils, Absolute 06/12/2019 3.89  1.70 - 7.00 10*3/mm3 Final   • Lymphocytes, Absolute 06/12/2019 3.52* 0.70 - 3.10 10*3/mm3 Final   • Monocytes, Absolute 06/12/2019 0.61  0.10 - 0.90 10*3/mm3 Final   • Eosinophils, Absolute 06/12/2019 0.10  0.00 - 0.40 10*3/mm3 Final   • Basophils, Absolute 06/12/2019 0.05  0.00 - 0.20 10*3/mm3 Final   • Immature Grans, Absolute 06/12/2019 0.02  0.00 - 0.05 10*3/mm3 Final   • nRBC 06/12/2019 0.0  0.0 - 0.2 /100 WBC Final

## 2019-09-10 ENCOUNTER — OFFICE VISIT (OUTPATIENT)
Dept: INTERNAL MEDICINE | Facility: CLINIC | Age: 84
End: 2019-09-10

## 2019-09-10 ENCOUNTER — APPOINTMENT (OUTPATIENT)
Dept: LAB | Facility: HOSPITAL | Age: 84
End: 2019-09-10

## 2019-09-10 VITALS
WEIGHT: 160 LBS | SYSTOLIC BLOOD PRESSURE: 130 MMHG | DIASTOLIC BLOOD PRESSURE: 58 MMHG | BODY MASS INDEX: 28.34 KG/M2 | TEMPERATURE: 97.9 F | OXYGEN SATURATION: 95 % | HEART RATE: 74 BPM

## 2019-09-10 DIAGNOSIS — E03.9 ACQUIRED HYPOTHYROIDISM: ICD-10-CM

## 2019-09-10 DIAGNOSIS — N30.01 ACUTE CYSTITIS WITH HEMATURIA: ICD-10-CM

## 2019-09-10 DIAGNOSIS — E78.2 MIXED HYPERLIPIDEMIA: ICD-10-CM

## 2019-09-10 DIAGNOSIS — I10 ESSENTIAL HYPERTENSION: Primary | ICD-10-CM

## 2019-09-10 LAB
BILIRUB BLD-MCNC: ABNORMAL MG/DL
CHOLEST SERPL-MCNC: 170 MG/DL (ref 0–200)
CLARITY, POC: CLEAR
COLOR UR: ABNORMAL
GLUCOSE UR STRIP-MCNC: NEGATIVE MG/DL
HDLC SERPL-MCNC: 55 MG/DL (ref 40–60)
KETONES UR QL: NEGATIVE
LDLC SERPL CALC-MCNC: 94 MG/DL (ref 0–100)
LDLC/HDLC SERPL: 1.7 {RATIO}
LEUKOCYTE EST, POC: ABNORMAL
NITRITE UR-MCNC: POSITIVE MG/ML
PH UR: 6 [PH] (ref 5–8)
PROT UR STRIP-MCNC: ABNORMAL MG/DL
RBC # UR STRIP: ABNORMAL /UL
SP GR UR: 1.01 (ref 1–1.03)
TRIGL SERPL-MCNC: 107 MG/DL (ref 0–150)
UROBILINOGEN UR QL: ABNORMAL
VLDLC SERPL-MCNC: 21.4 MG/DL (ref 5–40)

## 2019-09-10 PROCEDURE — 36415 COLL VENOUS BLD VENIPUNCTURE: CPT | Performed by: FAMILY MEDICINE

## 2019-09-10 PROCEDURE — 99214 OFFICE O/P EST MOD 30 MIN: CPT | Performed by: FAMILY MEDICINE

## 2019-09-10 PROCEDURE — 80061 LIPID PANEL: CPT | Performed by: FAMILY MEDICINE

## 2019-09-10 PROCEDURE — 81003 URINALYSIS AUTO W/O SCOPE: CPT | Performed by: FAMILY MEDICINE

## 2019-09-10 RX ORDER — SULFAMETHOXAZOLE AND TRIMETHOPRIM 800; 160 MG/1; MG/1
1 TABLET ORAL 2 TIMES DAILY
Qty: 10 TABLET | Refills: 0 | OUTPATIENT
Start: 2019-09-10 | End: 2019-12-18

## 2019-09-10 NOTE — PROGRESS NOTES
Marcelle Johnson is a 86 y.o. female.      Assessment/Plan   Problem List Items Addressed This Visit        Cardiovascular and Mediastinum    Mixed hyperlipidemia    Relevant Orders    Lipid Panel    Essential hypertension - Primary       Endocrine    Hypothyroidism       Genitourinary    Acute cystitis with hematuria    Relevant Orders    POCT urinalysis dipstick, automated (Completed)    Urine Culture - Urine, Urine, Clean Catch         Bactrim for suspected UTI follow-up results of lipid panel for ongoing management of hyperlipidemia continue present treatment for hypertension hypothyroidism based on previous lab results and patient's readings at home follow-up otherwise as needed or    Return in about 6 months (around 3/10/2020), or if symptoms worsen or fail to improve, for Recheck, Next scheduled follow up.      Chief Complaint   Patient presents with   • Urinary Frequency   • abdominal discomfort   • follow up to hypothyroidism   • follow up to hypertension   • follow up to hyperlipidemia     Social History     Tobacco Use   • Smoking status: Former Smoker     Years: 5.00     Types: Cigarettes     Last attempt to quit: 1966     Years since quittin.7   • Smokeless tobacco: Never Used   Substance Use Topics   • Alcohol use: No   • Drug use: No       History of Present Illness   Patient appointment for follow-up of chronic problems of hypertension hyperlipidemia hypothyroidism have been fairly stable she is developed some urinary frequency and abdominal discomfort in the suprapubic area she has no chest pain or shortness of breath blood pressure is well controlled she is trying to watch her diet with minimal success but is taking statin therapy she has no unwanted side effects she needs refills on her medication laboratory monitoring  The following portions of the patient's history were reviewed and updated as appropriate:PMHroutine: Social history , Allergies, Current Medications, Active Problem List and  Health Maintenance    Review of Systems   Constitutional: Negative for activity change, appetite change and unexpected weight change.   HENT: Negative for nosebleeds and trouble swallowing.    Eyes: Negative for pain and visual disturbance.   Respiratory: Negative for chest tightness, shortness of breath and wheezing.    Cardiovascular: Negative for chest pain and palpitations.   Gastrointestinal: Negative for abdominal pain and blood in stool.   Endocrine: Negative.    Genitourinary: Positive for dysuria and frequency. Negative for difficulty urinating and hematuria.   Musculoskeletal: Negative for joint swelling.   Skin: Negative for color change and rash.   Allergic/Immunologic: Negative.    Neurological: Negative for syncope and speech difficulty.   Hematological: Negative for adenopathy.   Psychiatric/Behavioral: Negative for agitation and confusion.   All other systems reviewed and are negative.      Objective   Vitals:    09/10/19 1003   BP: 130/58   BP Location: Right arm   Patient Position: Sitting   Cuff Size: Adult   Pulse: 74   Temp: 97.9 °F (36.6 °C)   TempSrc: Oral   SpO2: 95%   Weight: 72.6 kg (160 lb)     Body mass index is 28.34 kg/m².  Physical Exam   Constitutional: She is oriented to person, place, and time. She appears well-developed and well-nourished. No distress.   HENT:   Head: Normocephalic and atraumatic.   Eyes: Conjunctivae and EOM are normal. Pupils are equal, round, and reactive to light. Right eye exhibits no discharge. Left eye exhibits no discharge. No scleral icterus.   Neck: Normal range of motion. Neck supple. No tracheal deviation present. No thyromegaly present.   Cardiovascular: Normal rate, regular rhythm, normal heart sounds, intact distal pulses and normal pulses. Exam reveals no gallop.   No murmur heard.  Pulmonary/Chest: Effort normal and breath sounds normal. No respiratory distress. She has no wheezes. She has no rales.   Abdominal: Soft. Bowel sounds are normal. She  exhibits no distension. There is tenderness. There is no rebound and no guarding.   Musculoskeletal: Normal range of motion. She exhibits no edema.   Neurological: She is alert and oriented to person, place, and time. She exhibits normal muscle tone. Coordination normal.   Skin: Skin is warm. No rash noted. No erythema. No pallor.   Psychiatric: She has a normal mood and affect. Her behavior is normal. Judgment and thought content normal.   Nursing note and vitals reviewed.    Reviewed Data:  Office Visit on 09/10/2019   Component Date Value Ref Range Status   • Color 09/10/2019 Orange* Yellow, Straw, Dark Yellow, Pascale Final   • Clarity, UA 09/10/2019 Clear  Clear Final   • Specific Gravity  09/10/2019 1.015  1.005 - 1.030 Final   • pH, Urine 09/10/2019 6.0  5.0 - 8.0 Final   • Leukocytes 09/10/2019 Large (3+)* Negative Final   • Nitrite, UA 09/10/2019 Positive* Negative Final   • Protein, POC 09/10/2019 1+* Negative mg/dL Final   • Glucose, UA 09/10/2019 Negative  Negative, 1000 mg/dL (3+) mg/dL Final   • Ketones, UA 09/10/2019 Negative  Negative Final   • Urobilinogen, UA 09/10/2019 1 E.U./dL * Normal Final   • Bilirubin 09/10/2019 Small (1+)* Negative Final   • Blood, UA 09/10/2019 3+* Negative Final

## 2019-09-13 LAB
BACTERIA UR CULT: ABNORMAL
BACTERIA UR CULT: ABNORMAL
OTHER ANTIBIOTIC SUSC ISLT: ABNORMAL

## 2019-11-11 RX ORDER — TAMOXIFEN CITRATE 20 MG/1
TABLET ORAL
Qty: 90 TABLET | Refills: 1 | Status: SHIPPED | OUTPATIENT
Start: 2019-11-11 | End: 2020-05-26

## 2019-11-13 ENCOUNTER — HOSPITAL ENCOUNTER (OUTPATIENT)
Dept: MAMMOGRAPHY | Facility: HOSPITAL | Age: 84
Discharge: HOME | End: 2019-11-13
Admitting: INTERNAL MEDICINE

## 2019-11-13 DIAGNOSIS — C50.411 MALIGNANT NEOPLASM OF UPPER-OUTER QUADRANT OF RIGHT FEMALE BREAST, UNSPECIFIED ESTROGEN RECEPTOR STATUS (HCC): ICD-10-CM

## 2019-11-13 DIAGNOSIS — Z12.39 SCREENING BREAST EXAMINATION: ICD-10-CM

## 2019-11-13 PROCEDURE — 77067 SCR MAMMO BI INCL CAD: CPT

## 2019-11-13 RX ORDER — LOSARTAN POTASSIUM AND HYDROCHLOROTHIAZIDE 12.5; 1 MG/1; MG/1
TABLET ORAL
Qty: 90 TABLET | Refills: 2 | Status: SHIPPED | OUTPATIENT
Start: 2019-11-13 | End: 2020-06-05 | Stop reason: ALTCHOICE

## 2019-11-20 ENCOUNTER — OFFICE VISIT (OUTPATIENT)
Dept: ONCOLOGY | Facility: CLINIC | Age: 84
End: 2019-11-20

## 2019-11-20 ENCOUNTER — LAB (OUTPATIENT)
Dept: OTHER | Facility: HOSPITAL | Age: 84
End: 2019-11-20

## 2019-11-20 VITALS
DIASTOLIC BLOOD PRESSURE: 73 MMHG | SYSTOLIC BLOOD PRESSURE: 144 MMHG | OXYGEN SATURATION: 98 % | RESPIRATION RATE: 16 BRPM | HEART RATE: 77 BPM | HEIGHT: 63 IN | WEIGHT: 165.7 LBS | TEMPERATURE: 98.1 F | BODY MASS INDEX: 29.36 KG/M2

## 2019-11-20 DIAGNOSIS — K22.2 ESOPHAGEAL STRICTURE: ICD-10-CM

## 2019-11-20 DIAGNOSIS — C50.411 MALIGNANT NEOPLASM OF UPPER-OUTER QUADRANT OF RIGHT FEMALE BREAST, UNSPECIFIED ESTROGEN RECEPTOR STATUS (HCC): ICD-10-CM

## 2019-11-20 DIAGNOSIS — C50.411 MALIGNANT NEOPLASM OF UPPER-OUTER QUADRANT OF RIGHT FEMALE BREAST, UNSPECIFIED ESTROGEN RECEPTOR STATUS (HCC): Primary | ICD-10-CM

## 2019-11-20 DIAGNOSIS — M80.00XD OSTEOPOROSIS WITH CURRENT PATHOLOGICAL FRACTURE WITH ROUTINE HEALING, UNSPECIFIED OSTEOPOROSIS TYPE, SUBSEQUENT ENCOUNTER: ICD-10-CM

## 2019-11-20 LAB
ALBUMIN SERPL-MCNC: 3.9 G/DL (ref 3.5–5.2)
ALBUMIN/GLOB SERPL: 1.4 G/DL
ALP SERPL-CCNC: 58 U/L (ref 39–117)
ALT SERPL W P-5'-P-CCNC: 19 U/L (ref 1–33)
ANION GAP SERPL CALCULATED.3IONS-SCNC: 10 MMOL/L (ref 5–15)
AST SERPL-CCNC: 24 U/L (ref 1–32)
BASOPHILS # BLD AUTO: 0.06 10*3/MM3 (ref 0–0.2)
BASOPHILS NFR BLD AUTO: 0.6 % (ref 0–1.5)
BILIRUB SERPL-MCNC: 0.2 MG/DL (ref 0.1–1.2)
BUN BLD-MCNC: 12 MG/DL (ref 8–23)
BUN/CREAT SERPL: 15.4 (ref 7–25)
CALCIUM SPEC-SCNC: 9.3 MG/DL (ref 8.6–10.5)
CHLORIDE SERPL-SCNC: 95 MMOL/L (ref 98–107)
CO2 SERPL-SCNC: 29 MMOL/L (ref 22–29)
CREAT BLD-MCNC: 0.78 MG/DL (ref 0.57–1)
DEPRECATED RDW RBC AUTO: 41.1 FL (ref 37–54)
EOSINOPHIL # BLD AUTO: 0.13 10*3/MM3 (ref 0–0.4)
EOSINOPHIL NFR BLD AUTO: 1.3 % (ref 0.3–6.2)
ERYTHROCYTE [DISTWIDTH] IN BLOOD BY AUTOMATED COUNT: 12.8 % (ref 12.3–15.4)
GFR SERPL CREATININE-BSD FRML MDRD: 70 ML/MIN/1.73
GLOBULIN UR ELPH-MCNC: 2.8 GM/DL
GLUCOSE BLD-MCNC: 118 MG/DL (ref 65–99)
HCT VFR BLD AUTO: 37.2 % (ref 34–46.6)
HGB BLD-MCNC: 12.5 G/DL (ref 12–15.9)
IMM GRANULOCYTES # BLD AUTO: 0.03 10*3/MM3 (ref 0–0.05)
IMM GRANULOCYTES NFR BLD AUTO: 0.3 % (ref 0–0.5)
LYMPHOCYTES # BLD AUTO: 4.72 10*3/MM3 (ref 0.7–3.1)
LYMPHOCYTES NFR BLD AUTO: 45.4 % (ref 19.6–45.3)
MCH RBC QN AUTO: 29.3 PG (ref 26.6–33)
MCHC RBC AUTO-ENTMCNC: 33.6 G/DL (ref 31.5–35.7)
MCV RBC AUTO: 87.3 FL (ref 79–97)
MONOCYTES # BLD AUTO: 0.65 10*3/MM3 (ref 0.1–0.9)
MONOCYTES NFR BLD AUTO: 6.3 % (ref 5–12)
NEUTROPHILS # BLD AUTO: 4.8 10*3/MM3 (ref 1.7–7)
NEUTROPHILS NFR BLD AUTO: 46.1 % (ref 42.7–76)
NRBC BLD AUTO-RTO: 0 /100 WBC (ref 0–0.2)
PLATELET # BLD AUTO: 165 10*3/MM3 (ref 140–450)
PMV BLD AUTO: 10.1 FL (ref 6–12)
POTASSIUM BLD-SCNC: 3.8 MMOL/L (ref 3.5–5.2)
PROT SERPL-MCNC: 6.7 G/DL (ref 6–8.5)
RBC # BLD AUTO: 4.26 10*6/MM3 (ref 3.77–5.28)
SODIUM BLD-SCNC: 134 MMOL/L (ref 136–145)
WBC NRBC COR # BLD: 10.39 10*3/MM3 (ref 3.4–10.8)

## 2019-11-20 PROCEDURE — 85025 COMPLETE CBC W/AUTO DIFF WBC: CPT | Performed by: INTERNAL MEDICINE

## 2019-11-20 PROCEDURE — 80053 COMPREHEN METABOLIC PANEL: CPT | Performed by: INTERNAL MEDICINE

## 2019-11-20 PROCEDURE — 99214 OFFICE O/P EST MOD 30 MIN: CPT | Performed by: INTERNAL MEDICINE

## 2019-11-20 PROCEDURE — 36415 COLL VENOUS BLD VENIPUNCTURE: CPT

## 2019-11-20 RX ORDER — INFLUENZA A VIRUS A/MICHIGAN/45/2015 X-275 (H1N1) ANTIGEN (FORMALDEHYDE INACTIVATED), INFLUENZA A VIRUS A/SINGAPORE/INFIMH-16-0019/2016 IVR-186 (H3N2) ANTIGEN (FORMALDEHYDE INACTIVATED), AND INFLUENZA B VIRUS B/MARYLAND/15/2016 BX-69A (A B/COLORADO/6/2017-LIKE VIRUS) ANTIGEN (FORMALDEHYDE INACTIVATED) 60; 60; 60 UG/.5ML; UG/.5ML; UG/.5ML
INJECTION, SUSPENSION INTRAMUSCULAR
Refills: 0 | COMMUNITY
Start: 2019-11-04 | End: 2020-01-21

## 2019-11-20 NOTE — PROGRESS NOTES
Subjective .    REASONS FOR FOLLOWUP: 1.  [id number] invasive ductal carcinoma of the right breast, estrogen receptor positive greater than 90%, progesterone receptor 90%, HER-2/breanne 2+ on immunohistochemistry and negative by fish  2.  Osteoporosis  3. arimidex held secondary to arthralgias.  4. Side effect to prolia, patient complained of jaw pain secondary to Prolia and refused any bisphosphonates  5.  Arimidex discontinued with resolution of arthralgias.  6.  Patient started on tamoxifen as of July 6, 2017.    HISTORY OF PRESENT ILLNESS:  The patient is a 86 y.o. year old female who is here for follow-up with the above-mentioned history.    History of Present Illness   patient is a 85-year-old female with a above history. She continues to take tamoxifen daily as prescribed with good tolerance.      Overall, patient reports feeling well.  She has not lost weight and spends most of her time taking care of her .  She saw Dr. Hopson for a colonoscopy which was normal apart from some hemorrhoids, and will see him again in 5 years.    She is still taking Tamoxifen and is tolerating it well.      Patient's labs from 11/20/19 are normal.    We reviewed with patient the screening mammogram from 11/13/19 which was benign-negative.        Past Medical History:   Diagnosis Date   • Breast cancer of upper-outer quadrant of right female breast (CMS/HCC) 10/13/2016   • Colon cancer (CMS/HCC)     had colon resection   • Diabetes (CMS/HCC)     borderline   • Diverticulosis    • Esophageal stricture     had it stretched via scope   • High cholesterol    • History of bacterial pneumonia     SEVERAL YEARS AGO   • Hypertension    • Hypothyroid    • LBBB (left bundle branch block)    • Sleep trouble        ONCOLOGIC HISTORY:    patient is a 83-year-old female who states that she noticed a lump in the upper outer quadrant of the right breast over the last 2 months. She thinks it was the size of a dime. She then underwent a  mammogram on 8/3/2016 which showed a 1 cm mass in the middle third upper outer quadrant of the right breast. Additionly  there was a small asymmetry in the left breast. Spot compression showed partial resolution of this area in the left breast. This measures 0.8 cm. No evidence of axillary adenopathy seen.     Ultrasound was done which shows at 10 o'clock position in the right breast there is a 0.9 cm irregular heterogeneous mass. But no abnormality is seen in the left breast. A six-month follow-up on the left breast is suggested.     Patient then underwent ultrasound-guided biopsy on 8/17/2016. Allergies shows invasive mammary carcinoma ductal type with focal lobular features in the right breast, Nadira score of 5 out of 9. There is no angiolymphatic invasion seen. Maximum dimension of invasive carcinoma in 7 mm. No carcinoma in situ identified. Estrogen receptors were greater than 90%, progesterone receptor is 90%, HER-2/breanne was 2+ on immunohistochemistry and negative by fish.     MRI of the breast shows middle third upper quadrant of the right breast at 10 o'clock position the biopsy cavity measured 1.1 cm from superior to inferior dimension 1.3 cm in anterior to posterior dimension and 2.1 cm from medial to lateral dimension. There were no other areas of enhancement in the left breast.  Chest x-ray no acute infiltrate.     Patient underwent lumpectomy on the right side with sentinel lymph node biopsy by Dr. Lee on 9/19/2016. Patient had invasive carcinoma 1.2 cm with lobular features. The overall grade is grade 2 with her Nadira score of 6 out of 9. He had focal ductal carcinoma in situ and focal atypical lobular hyperplasia as well. The ductal carcinoma in situ was less than 1 mm, cribriform pattern and low nuclear grade grade 1. The margins were indeterminate for invasive carcinoma but uninvolved by DCIS. Patient has had reexcised margins and invasive carcinoma is present only in the reexcised  anterior margin. The tumor focally extends to within 1 mm of the reexcised anterior margin. The fibroinflammatory biopsy skin changes extend to the anterior margin. I will need to discuss with the pathologist about the margins. She has extends to within 3 mm was to margins and to within 5 mm in the inferior margin. The number of sentinel lymph nodes examined is to and there is no evidence of my or macro metastasis and no isolated tumor cells.     Patient now has been referred to radiation oncology and us for further options of treatment.  Given her age, her low stage, her strong estrogen and progesterone receptor positivity Dr. Nolasco felt that she does not need to undergo radiation.    Patient's bone density done shows osteoporosis.  Discussion done about consideration of tamoxifen versus Arimidex with prolia.  Patient refuses tamoxifen and is willing to consider Arimidex.  We did discuss that it'll be important for her to take prolia , calcium and vitamin D.  Pt could not tolerate prolia. Prolia stopped on 06/12/2019    Pt on arimidex  and likely change to tamoxifen. Due to side effects    Arimidex discontinued secondary to severe arthralgias.  Tamoxifen started as of July 6, 2017.      Past medical history: Patient was diagnosed with the colon cancer back in 1989 and at the same time she had her ovaries removed and appendix and gallbladder removed. She didn't require any chemotherapy at the time.  Patient had pneumonia back in 1995.  Her hypertension  High cholesterol  Hypothyroidism  Patient has had colonoscopy 5 years ago by Dr. Tanner and apparently had a polyp she was to return to him for another colonoscopy in 5 years.  Patient had esophageal stricture as a result she underwent dilation by Dr. Hopson last year.  She underwent parathyroidectomy.  She has borderline diabetes. On diet control     OB/GYN history she started metastases. At age 12, had menopause at age 50 when she underwent oophorectomy. She  is  2 para 2 no miscarriages she has 2 children one is 54 and second one is 51-year-old and one grandchild.       Current Outpatient Medications on File Prior to Visit   Medication Sig Dispense Refill   • albuterol sulfate  (90 Base) MCG/ACT inhaler Inhale 2 puffs Every 4 (Four) Hours As Needed for Wheezing. 1 inhaler 0   • Calcium Citrate-Vitamin D (CALCIUM + D PO) Take 1,000 mg by mouth daily.     • Esomeprazole Magnesium (NEXIUM PO) Take 22.3 mg by mouth every morning.     • FIBER PO Take 2 capsules by mouth daily.     • levothyroxine (SYNTHROID, LEVOTHROID) 100 MCG tablet Take 1 tablet by mouth Daily. 90 tablet 2   • losartan-hydrochlorothiazide (HYZAAR) 100-12.5 MG per tablet TAKE 1 TABLET BY MOUTH EVERY DAY 90 tablet 2   • Multiple Vitamins-Minerals (MULTIVITAMIN PO) Take 1 tablet by mouth Daily.     • pravastatin (PRAVACHOL) 40 MG tablet Take 1 tablet by mouth Every Night. 90 tablet 2   • sulfamethoxazole-trimethoprim (BACTRIM DS) 800-160 MG per tablet Take 1 tablet by mouth 2 (Two) Times a Day. 10 tablet 0   • tamoxifen (NOLVADEX) 20 MG chemo tablet TAKE 1 TABLET BY MOUTH EVERY DAY 90 tablet 1   • vitamin B-6 (PYRIDOXINE) 50 MG tablet Take 50 mg by mouth Daily.     • vitamin C (ASCORBIC ACID) 500 MG tablet Take 500 mg by mouth Daily As Needed.       No current facility-administered medications on file prior to visit.        ALLERGIES:     Allergies   Allergen Reactions   • Morphine And Related Nausea And Vomiting       Social History     Socioeconomic History   • Marital status:      Spouse name: Not on file   • Number of children: 2   • Years of education: High school   • Highest education level: Not on file   Occupational History     Employer: RETIRED   Tobacco Use   • Smoking status: Former Smoker     Years: 5.00     Types: Cigarettes     Last attempt to quit: 1966     Years since quittin.9   • Smokeless tobacco: Never Used   Substance and Sexual Activity   • Alcohol use: No   •  Drug use: No   • Sexual activity: Defer         Cancer-related family history is negative for Breast cancer, Colon cancer, Endometrial cancer, and Ovarian cancer.     Review of Systems   Constitutional: Negative for appetite change, chills, diaphoresis, fatigue, fever and unexpected weight change.   HENT: Positive for dental problem (06/12/19-Sore on gums). Negative for hearing loss, sore throat and trouble swallowing.         See HPI   Respiratory: Negative for cough, chest tightness, shortness of breath and wheezing.    Cardiovascular: Negative for chest pain, palpitations and leg swelling.   Gastrointestinal: Negative for abdominal distention, abdominal pain, constipation, diarrhea, nausea and vomiting.   Genitourinary: Negative for dysuria, frequency, hematuria and urgency.   Musculoskeletal: Negative for joint swelling.        No muscle weakness.   Skin: Negative for rash and wound.   Neurological: Negative for seizures, syncope, speech difficulty, weakness, numbness and headaches.   Hematological: Negative for adenopathy. Does not bruise/bleed easily.   Psychiatric/Behavioral: Negative for behavioral problems, confusion and suicidal ideas.         Objective      There were no vitals filed for this visit.  Current Status 12/5/2018   ECOG score 0     Physical Exam    GENERAL:  Well-developed, well-nourished in no acute distress.   SKIN:  Warm, dry without rashes, purpura or petechiae.  NECK:  Supple with good range of motion; no thyromegaly or masses, no JVD.  LYMPHATICS:  No cervical, supraclavicular, axillary or inguinal adenopathy.  CHEST:  Lungs clear to auscultation. Good airflow.   BREAST: Right breast: S/p right lumpectomy. No skin changes, no evidence of breast mass, no nipple discharge, no evidence of any right axillary adenopathy or right supraclavicular adenopathy  Left breast: No evidence of any skin changes, no evidence of any left breast mass and no evidence of left nipple discharge as well as no  left axillary adenopathy or left supraclavicular adenopathy.  CARDIAC:  Regular rate and rhythm without murmurs, rubs or gallops. Normal S1,S2.  ABDOMEN:  Soft, nontender with no hepatosplenomegaly or masses.  EXTREMITIES:  No clubbing, cyanosis or edema.  NEUROLOGICAL:  Cranial Nerves II-XII grossly intact.  No focal neurological deficits.  PSYCHIATRIC:  Normal affect and mood.      RECENT LABS:  Hematology WBC   Date Value Ref Range Status   2019 8.19 3.40 - 10.80 10*3/mm3 Final     RBC   Date Value Ref Range Status   2019 4.13 3.77 - 5.28 10*6/mm3 Final   2016 4.63  Final     Hemoglobin   Date Value Ref Range Status   2019 12.1 12.0 - 15.9 g/dL Final     Hematocrit   Date Value Ref Range Status   2019 35.8 34.0 - 46.6 % Final     Platelets   Date Value Ref Range Status   2019 165 140 - 450 10*3/mm3 Final      IMAGIN19 - Screening Bilateral Mammogram  Impression: Benign    Assessment/Plan    1. T1 cN0 M0 invasive ductal carcinoma of the right breast, with lobular features, ER positive at 90%, OR positive at 90%, HER-2/breanne 2+ by minimal histochemistry and negative by fish with a HER-2/CEP ratio of 1.1.    · She is on tamoxifen daily and tolerating well.  · Mammogram from 19 was negative.    2. Osteoporosis:  She is on calcium 1200 mg once daily, vitamin D 1000 international units daily as well.  Patient will not be getting Prolia from now on due to issues with her gums.  She refused Fosamax    3. Benign appearing esophageal stenosis: dilated by Dr. Hopson on 2019    4. Colonoscopy: done on 2019 was benign done by Dr. Hopson.    5.  Mild hyponatremia  Plan  1. Continue tamoxifen  2. We reviewed her colonoscopy and screening mammogram results.  3. Patient will be due for mammogram again in 2020  4. Follow-up with me in 6 months with labs   5. No plans on giving Prolia.  Discussed Fosamax.  Patient refused.  6. Patient has been advised to  exercise regularly.  7. Patient has been advised to increase the sodium content in her diet      I, Marie Roberts, acted as scribe for Maria M Galicia MD  in documenting the service or procedure. To the best of my knowledge, I recorded what was dictated by Maria M Galicia MD    Thank you for allowing me to participate in the care of this patient     Maria M Galicia MD  11/20/19      Cc:   Dr. James Weinberg MD

## 2020-01-07 RX ORDER — LEVOTHYROXINE SODIUM 0.1 MG/1
100 TABLET ORAL DAILY
Qty: 90 TABLET | Refills: 0 | Status: SHIPPED | OUTPATIENT
Start: 2020-01-07 | End: 2021-01-21 | Stop reason: SDUPTHER

## 2020-01-07 RX ORDER — LEVOTHYROXINE SODIUM 0.1 MG/1
TABLET ORAL
Qty: 90 TABLET | Refills: 2 | Status: SHIPPED | OUTPATIENT
Start: 2020-01-07 | End: 2020-01-07 | Stop reason: SDUPTHER

## 2020-01-20 NOTE — PROGRESS NOTES
Subjective:     Encounter Date:01/21/2020      Patient ID: Marcelle Johnson is a 87 y.o. female.    Chief Complaint: HTN, HLD  HPI:  This is an 87-year-old lady with a past medical history of breast cancer, colon cancer, diet-controlled diabetes, hypertension, hyperlipidemia.  She presented originally for preoperative evaluation prior to EGD with esophageal stricture dilation as well as screening colonoscopy.  She was found to have an abnormal EKG in December 2018 showing a left bundle branch block.     Today, she is feeling well.  She has no complaints.  She has not been exercising as much as she did last year, but continues to feel well.    REVIEW OF SYSTEMS:   All systems reviewed.  Pertinent positives identified in HPI.  All other systems are negative.    The following portions of the patient's history were reviewed and updated as appropriate: allergies, current medications, past family history, past medical history, past social history, past surgical history and problem list.    Past Medical History:   Diagnosis Date   • Breast cancer of upper-outer quadrant of right female breast (CMS/HCC) 10/13/2016   • Colon cancer (CMS/HCC)     had colon resection   • Diabetes (CMS/HCC)     borderline   • Diverticulosis    • Esophageal stricture     had it stretched via scope   • High cholesterol    • History of bacterial pneumonia     SEVERAL YEARS AGO   • Hypertension    • Hypothyroid    • LBBB (left bundle branch block)    • Sleep trouble        Family History   Problem Relation Age of Onset   • Diabetes Mother    • No Known Problems Father    • No Known Problems Sister    • No Known Problems Brother    • No Known Problems Daughter    • No Known Problems Son    • No Known Problems Maternal Grandmother    • No Known Problems Paternal Grandmother    • No Known Problems Maternal Aunt    • No Known Problems Paternal Aunt    • BRCA 1/2 Neg Hx    • Breast cancer Neg Hx    • Colon cancer Neg Hx    • Endometrial cancer Neg Hx     • Ovarian cancer Neg Hx    • Malig Hyperthermia Neg Hx        Social History     Socioeconomic History   • Marital status:      Spouse name: Not on file   • Number of children: 2   • Years of education: High school   • Highest education level: Not on file   Occupational History     Employer: RETIRED   Tobacco Use   • Smoking status: Former Smoker     Years: 5.00     Types: Cigarettes     Last attempt to quit: 1966     Years since quittin.0   • Smokeless tobacco: Never Used   Substance and Sexual Activity   • Alcohol use: No   • Drug use: No   • Sexual activity: Defer       Allergies   Allergen Reactions   • Morphine And Related Nausea And Vomiting       Past Surgical History:   Procedure Laterality Date   • APPENDECTOMY     • BREAST BIOPSY Right    • BREAST LUMPECTOMY     • BREAST LUMPECTOMY WITH SENTINEL NODE BIOPSY Right 2016    Procedure: RIGHT BREAST LUMPECTOMY WITH SENTINEL NODE BIOPSY;  Surgeon: James Lee MD;  Location[facility] [place];  Service:    • CHOLECYSTECTOMY     • COLON RESECTION  1990    colon cancer early stage   • COLONOSCOPY  10/05/2011    tics, tubular adenoma polyp    • COLONOSCOPY N/A 3/29/2019    NTEH, Diverticulosis, IH.    • ENDOSCOPY  2015    Z line irreg, HH, gastritis, bilious gastric fluid, duodenitis.     • ENDOSCOPY N/A 3/29/2019    LA Grade B reflux esophagitis, HH, Gastritis    • HYSTERECTOMY      partial   • OOPHORECTOMY     • PARATHYROID GLAND SURGERY      partial             ECG 12 Lead  Date/Time: 2020 12:42 PM  Performed by: Ana Jain MD  Authorized by: Ana Jain MD   Comparison: compared with previous ECG from 1/15/2019  Similar to previous ECG  Rhythm: sinus rhythm  Rate: normal  Conduction: left bundle branch block  ST Segments: ST segments normal  T Waves: T waves normal  QRS axis: normal  Other: no other findings    Clinical impression: abnormal EKG               Objective:     Physical Exam  GEN: no distress, alert and  oriented  Lungs CTAB, no rales or wheezes  Chest: no abnormalities  Heart: RRR, no murmurs  Abdo: soft,  Nontender, nondistended  Extr: no edema, +2 DP and 2+ carotid pulses b/l  Skin: no rash or bruising  Psych: organized thought, normal behavior and affect    Outpatient Encounter Medications as of 1/21/2020   Medication Sig Dispense Refill   • albuterol sulfate  (90 Base) MCG/ACT inhaler Inhale 2 puffs Every 4 (Four) Hours As Needed for Wheezing. 1 inhaler 0   • Calcium Citrate-Vitamin D (CALCIUM + D PO) Take 1,000 mg by mouth daily.     • Esomeprazole Magnesium (NEXIUM PO) Take 22.3 mg by mouth every morning.     • FIBER PO Take 2 capsules by mouth daily.     • levothyroxine (SYNTHROID, LEVOTHROID) 100 MCG tablet Take 1 tablet by mouth Daily. 90 tablet 0   • losartan-hydrochlorothiazide (HYZAAR) 100-12.5 MG per tablet TAKE 1 TABLET BY MOUTH EVERY DAY 90 tablet 2   • methylPREDNISolone (MEDROL, ROSA,) 4 MG tablet Take as directed on package instructions. 21 tablet 0   • Multiple Vitamins-Minerals (MULTIVITAMIN PO) Take 1 tablet by mouth Daily.     • pravastatin (PRAVACHOL) 40 MG tablet Take 1 tablet by mouth Every Night. 90 tablet 2   • tamoxifen (NOLVADEX) 20 MG chemo tablet TAKE 1 TABLET BY MOUTH EVERY DAY 90 tablet 1   • vitamin B-6 (PYRIDOXINE) 50 MG tablet Take 50 mg by mouth Daily.     • vitamin C (ASCORBIC ACID) 500 MG tablet Take 500 mg by mouth Daily As Needed.     • [DISCONTINUED] FLUZONE HIGH-DOSE 0.5 ML suspension prefilled syringe injection TO BE ADMINISTERED BY PHARMACIST FOR IMMUNIZATION  0   • [DISCONTINUED] azithromycin (ZITHROMAX Z-ROSA) 250 MG tablet Take 2 tablets the first day, then 1 tablet daily for 4 days. 6 tablet 0   • [DISCONTINUED] guaiFENesin-codeine (GUAIFENESIN AC) 100-10 MG/5ML liquid Take 5 mL by mouth 4 (Four) Times a Day As Needed for Cough. 120 mL 0     No facility-administered encounter medications on file as of 1/21/2020.              Assessment:          Diagnosis Plan    1. Mixed hyperlipidemia     2. Essential hypertension     3. Type 2 diabetes mellitus without complication, without long-term current use of insulin (CMS/[facility])     4. LBBB (left bundle branch block)            Plan:         1. HTN: well controlled.   2. HLD: on pravastatin- at this age I would probably stop the statin. I will address it at the next visit if she is still on it at that point.   3. DM: diet controlled.   4. LBBB: stable.     Dr. Weinberg, thank you for referring this kind patient to me.  She looks great, especially for her age.  I will see her back in the office in 1 year.         Ana Jain MD  01/20/20

## 2020-01-21 ENCOUNTER — OFFICE VISIT (OUTPATIENT)
Dept: CARDIOLOGY | Facility: CLINIC | Age: 85
End: 2020-01-21

## 2020-01-21 VITALS
BODY MASS INDEX: 29.23 KG/M2 | SYSTOLIC BLOOD PRESSURE: 148 MMHG | HEIGHT: 63 IN | DIASTOLIC BLOOD PRESSURE: 78 MMHG | WEIGHT: 165 LBS | HEART RATE: 80 BPM

## 2020-01-21 DIAGNOSIS — I44.7 LBBB (LEFT BUNDLE BRANCH BLOCK): ICD-10-CM

## 2020-01-21 DIAGNOSIS — E11.9 TYPE 2 DIABETES MELLITUS WITHOUT COMPLICATION, WITHOUT LONG-TERM CURRENT USE OF INSULIN (HCC): ICD-10-CM

## 2020-01-21 DIAGNOSIS — I10 ESSENTIAL HYPERTENSION: ICD-10-CM

## 2020-01-21 DIAGNOSIS — E78.2 MIXED HYPERLIPIDEMIA: Primary | ICD-10-CM

## 2020-01-21 PROCEDURE — 93000 ELECTROCARDIOGRAM COMPLETE: CPT | Performed by: INTERNAL MEDICINE

## 2020-01-21 PROCEDURE — 99213 OFFICE O/P EST LOW 20 MIN: CPT | Performed by: INTERNAL MEDICINE

## 2020-05-26 RX ORDER — TAMOXIFEN CITRATE 20 MG/1
TABLET ORAL
Qty: 90 TABLET | Refills: 0 | Status: SHIPPED | OUTPATIENT
Start: 2020-05-26 | End: 2020-08-17

## 2020-05-27 ENCOUNTER — OFFICE VISIT (OUTPATIENT)
Dept: ONCOLOGY | Facility: CLINIC | Age: 85
End: 2020-05-27

## 2020-05-27 ENCOUNTER — APPOINTMENT (OUTPATIENT)
Dept: OTHER | Facility: HOSPITAL | Age: 85
End: 2020-05-27

## 2020-05-27 VITALS
BODY MASS INDEX: 29.46 KG/M2 | HEIGHT: 63 IN | TEMPERATURE: 96.9 F | HEART RATE: 88 BPM | RESPIRATION RATE: 16 BRPM | DIASTOLIC BLOOD PRESSURE: 69 MMHG | OXYGEN SATURATION: 94 % | SYSTOLIC BLOOD PRESSURE: 139 MMHG | WEIGHT: 166.3 LBS

## 2020-05-27 DIAGNOSIS — C50.411 MALIGNANT NEOPLASM OF UPPER-OUTER QUADRANT OF RIGHT FEMALE BREAST, UNSPECIFIED ESTROGEN RECEPTOR STATUS (HCC): ICD-10-CM

## 2020-05-27 DIAGNOSIS — M80.00XD OSTEOPOROSIS WITH CURRENT PATHOLOGICAL FRACTURE WITH ROUTINE HEALING, UNSPECIFIED OSTEOPOROSIS TYPE, SUBSEQUENT ENCOUNTER: ICD-10-CM

## 2020-05-27 DIAGNOSIS — C50.919 MALIGNANT NEOPLASM OF FEMALE BREAST, UNSPECIFIED ESTROGEN RECEPTOR STATUS, UNSPECIFIED LATERALITY, UNSPECIFIED SITE OF BREAST (HCC): Primary | ICD-10-CM

## 2020-05-27 PROCEDURE — 99213 OFFICE O/P EST LOW 20 MIN: CPT | Performed by: INTERNAL MEDICINE

## 2020-05-27 RX ORDER — MELATONIN
1000 DAILY
COMMUNITY

## 2020-05-27 NOTE — PROGRESS NOTES
Subjective .    REASONS FOR FOLLOWUP: 1.  [id number] invasive ductal carcinoma of the right breast, estrogen receptor positive greater than 90%, progesterone receptor 90%, HER-2/breanne 2+ on immunohistochemistry and negative by fish  2.  Osteoporosis  3. arimidex held secondary to arthralgias.  4. Side effect to prolia, patient complained of jaw pain secondary to Prolia and refused any bisphosphonates  5.  Arimidex discontinued with resolution of arthralgias.  6.  Patient started on tamoxifen as of July 6, 2017.    HISTORY OF PRESENT ILLNESS:  The patient is a 87 y.o. year old female who is here for follow-up with the above-mentioned history.    History of Present Illness   patient is a 85-year-old female with a above history. She continues to take tamoxifen daily as prescribed with good tolerance.      Overall, patient reports feeling well.  She has not lost weight and spends most of her time taking care of her .  She saw Dr. Hopson for a colonoscopy which was normal apart from some hemorrhoids, and will see him again in 5 years.    She is still taking Tamoxifen and is tolerating it well.      Patient's labs from 11/20/19 are normal.    We reviewed with patient the screening mammogram from 11/13/19 which was benign-negative.    Interval history: Patient is tolerating tamoxifen.  She does not have any hot flashes.  She has not developed any DVT.  She denies any active bleeding.    Past Medical History:   Diagnosis Date   • Breast cancer of upper-outer quadrant of right female breast (CMS/HCC) 10/13/2016   • Colon cancer (CMS/HCC)     had colon resection   • Diabetes (CMS/HCC)     borderline   • Diverticulosis    • Esophageal stricture     had it stretched via scope   • High cholesterol    • History of bacterial pneumonia     SEVERAL YEARS AGO   • Hypertension    • Hypothyroid    • LBBB (left bundle branch block)    • Sleep trouble        ONCOLOGIC HISTORY:    patient is a 83-year-old female who states that she  noticed a lump in the upper outer quadrant of the right breast over the last 2 months. She thinks it was the size of a dime. She then underwent a mammogram on 8/3/2016 which showed a 1 cm mass in the middle third upper outer quadrant of the right breast. Additionly  there was a small asymmetry in the left breast. Spot compression showed partial resolution of this area in the left breast. This measures 0.8 cm. No evidence of axillary adenopathy seen.     Ultrasound was done which shows at 10 o'clock position in the right breast there is a 0.9 cm irregular heterogeneous mass. But no abnormality is seen in the left breast. A six-month follow-up on the left breast is suggested.     Patient then underwent ultrasound-guided biopsy on 8/17/2016. Allergies shows invasive mammary carcinoma ductal type with focal lobular features in the right breast, Nadira score of 5 out of 9. There is no angiolymphatic invasion seen. Maximum dimension of invasive carcinoma in 7 mm. No carcinoma in situ identified. Estrogen receptors were greater than 90%, progesterone receptor is 90%, HER-2/breanne was 2+ on immunohistochemistry and negative by fish.     MRI of the breast shows middle third upper quadrant of the right breast at 10 o'clock position the biopsy cavity measured 1.1 cm from superior to inferior dimension 1.3 cm in anterior to posterior dimension and 2.1 cm from medial to lateral dimension. There were no other areas of enhancement in the left breast.  Chest x-ray no acute infiltrate.     Patient underwent lumpectomy on the right side with sentinel lymph node biopsy by Dr. Lee on 9/19/2016. Patient had invasive carcinoma 1.2 cm with lobular features. The overall grade is grade 2 with her Ossian score of 6 out of 9. He had focal ductal carcinoma in situ and focal atypical lobular hyperplasia as well. The ductal carcinoma in situ was less than 1 mm, cribriform pattern and low nuclear grade grade 1. The margins were  indeterminate for invasive carcinoma but uninvolved by DCIS. Patient has had reexcised margins and invasive carcinoma is present only in the reexcised anterior margin. The tumor focally extends to within 1 mm of the reexcised anterior margin. The fibroinflammatory biopsy skin changes extend to the anterior margin. I will need to discuss with the pathologist about the margins. She has extends to within 3 mm was to margins and to within 5 mm in the inferior margin. The number of sentinel lymph nodes examined is to and there is no evidence of my or macro metastasis and no isolated tumor cells.     Patient now has been referred to radiation oncology and us for further options of treatment.  Given her age, her low stage, her strong estrogen and progesterone receptor positivity Dr. Nolasco felt that she does not need to undergo radiation.    Patient's bone density done shows osteoporosis.  Discussion done about consideration of tamoxifen versus Arimidex with prolia.  Patient refuses tamoxifen and is willing to consider Arimidex.  We did discuss that it'll be important for her to take prolia , calcium and vitamin D.  Pt could not tolerate prolia. Prolia stopped on 06/12/2019    Pt on arimidex  and likely change to tamoxifen. Due to side effects    Arimidex discontinued secondary to severe arthralgias.  Tamoxifen started as of July 6, 2017.      Past medical history: Patient was diagnosed with the colon cancer back in 1989 and at the same time she had her ovaries removed and appendix and gallbladder removed. She didn't require any chemotherapy at the time.  Patient had pneumonia back in 1995.  Her hypertension  High cholesterol  Hypothyroidism  Patient has had colonoscopy 5 years ago by Dr. Tanner and apparently had a polyp she was to return to him for another colonoscopy in 5 years.  Patient had esophageal stricture as a result she underwent dilation by Dr. Hopson last year.  She underwent parathyroidectomy.  She has  borderline diabetes. On diet control     OB/GYN history she started metastases. At age 12, had menopause at age 50 when she underwent oophorectomy. She is  2 para 2 no miscarriages she has 2 children one is 54 and second one is 51-year-old and one grandchild.       Current Outpatient Medications on File Prior to Visit   Medication Sig Dispense Refill   • Calcium Citrate-Vitamin D (CALCIUM + D PO) Take 1,000 mg by mouth daily.     • cholecalciferol (VITAMIN D3) 25 MCG (1000 UT) tablet Take 1,000 Units by mouth Daily.     • Esomeprazole Magnesium (NEXIUM PO) Take 22.3 mg by mouth every morning.     • FIBER PO Take 2 capsules by mouth daily.     • levothyroxine (SYNTHROID, LEVOTHROID) 100 MCG tablet Take 1 tablet by mouth Daily. 90 tablet 0   • losartan-hydrochlorothiazide (HYZAAR) 100-12.5 MG per tablet TAKE 1 TABLET BY MOUTH EVERY DAY 90 tablet 2   • pravastatin (PRAVACHOL) 40 MG tablet Take 1 tablet by mouth Every Night. 90 tablet 2   • tamoxifen (NOLVADEX) 20 MG chemo tablet TAKE 1 TABLET BY MOUTH EVERY DAY 90 tablet 0   • vitamin B-6 (PYRIDOXINE) 50 MG tablet Take 50 mg by mouth Daily.     • vitamin C (ASCORBIC ACID) 500 MG tablet Take 500 mg by mouth Daily As Needed.     • albuterol sulfate  (90 Base) MCG/ACT inhaler Inhale 2 puffs Every 4 (Four) Hours As Needed for Wheezing. 1 inhaler 0   • methylPREDNISolone (MEDROL, ROSA,) 4 MG tablet Take as directed on package instructions. 21 tablet 0   • Multiple Vitamins-Minerals (MULTIVITAMIN PO) Take 1 tablet by mouth Daily.       No current facility-administered medications on file prior to visit.        ALLERGIES:     Allergies   Allergen Reactions   • Morphine And Related Nausea And Vomiting       Social History     Socioeconomic History   • Marital status:      Spouse name: Not on file   • Number of children: 2   • Years of education: High school   • Highest education level: Not on file   Occupational History     Employer: RETIRED   Tobacco Use  "  • Smoking status: Former Smoker     Years: 5.00     Types: Cigarettes     Last attempt to quit: 1966     Years since quittin.4   • Smokeless tobacco: Never Used   Substance and Sexual Activity   • Alcohol use: No   • Drug use: No   • Sexual activity: Defer         Cancer-related family history is negative for Breast cancer, Colon cancer, Endometrial cancer, and Ovarian cancer.     Review of Systems   Constitutional: Negative for appetite change, chills, diaphoresis, fatigue, fever and unexpected weight change.   HENT: Negative for hearing loss, sore throat and trouble swallowing.         See HPI   Respiratory: Negative for cough, chest tightness, shortness of breath and wheezing.    Cardiovascular: Negative for chest pain, palpitations and leg swelling.   Gastrointestinal: Negative for abdominal distention, abdominal pain, constipation, diarrhea, nausea and vomiting.   Genitourinary: Negative for dysuria, frequency, hematuria and urgency.   Musculoskeletal: Negative for joint swelling.        No muscle weakness.   Skin: Negative for rash and wound.   Neurological: Negative for seizures, syncope, speech difficulty, weakness, numbness and headaches.   Hematological: Negative for adenopathy. Does not bruise/bleed easily.   Psychiatric/Behavioral: Negative for behavioral problems, confusion and suicidal ideas.     May 27, 2020 review of system updated and unchanged    Objective      Vitals:    20 1257   BP: 139/69   Pulse: 88   Resp: 16   Temp: 96.9 °F (36.1 °C)   TempSrc: Skin   SpO2: 94%   Weight: 75.4 kg (166 lb 4.8 oz)   Height: 160 cm (62.99\")   PainSc: 0-No pain     Current Status 2020   ECOG score 0     Physical Exam    GENERAL:  Well-developed, well-nourished in no acute distress.   SKIN:  Warm, dry without rashes, purpura or petechiae.  NECK:  Supple with good range of motion; no thyromegaly or masses, no JVD.  LYMPHATICS:  No cervical, supraclavicular, axillary or inguinal adenopathy.  CHEST: "  Lungs clear to auscultation. Good airflow.   BREAST: Right breast: S/p right lumpectomy. No skin changes, no evidence of breast mass, no nipple discharge, no evidence of any right axillary adenopathy or right supraclavicular adenopathy  Left breast: No evidence of any skin changes, no evidence of any left breast mass and no evidence of left nipple discharge as well as no left axillary adenopathy or left supraclavicular adenopathy.  CARDIAC:  Regular rate and rhythm without murmurs, rubs or gallops. Normal S1,S2.  ABDOMEN:  Soft, nontender with no hepatosplenomegaly or masses.  EXTREMITIES:  No clubbing, cyanosis or edema.    Examined patient today and no change from before  RECENT LABS:  Hematology WBC   Date Value Ref Range Status   2019 10.39 3.40 - 10.80 10*3/mm3 Final     RBC   Date Value Ref Range Status   2019 4.26 3.77 - 5.28 10*6/mm3 Final   2016 4.63  Final     Hemoglobin   Date Value Ref Range Status   2019 12.5 12.0 - 15.9 g/dL Final     Hematocrit   Date Value Ref Range Status   2019 37.2 34.0 - 46.6 % Final     Platelets   Date Value Ref Range Status   2019 165 140 - 450 10*3/mm3 Final      IMAGIN19 - Screening Bilateral Mammogram  Impression: Benign    Assessment/Plan    1. T1 cN0 M0 invasive ductal carcinoma of the right breast, with lobular features, ER positive at 90%, OH positive at 90%, HER-2/breanne 2+ by minimal histochemistry and negative by fish with a HER-2/CEP ratio of 1.1.    · She is on tamoxifen daily and tolerating well.  · Mammogram from 19 was negative.  Mammogram due 2020    2. Osteoporosis:  She is on calcium 1200 mg once daily, vitamin D 1000 international units daily as well.  Patient will not be getting Prolia from now on due to issues with her gums.  She refused Fosamax    3. Benign appearing esophageal stenosis: dilated by Dr. Hopson on 2019    4. Colonoscopy: done on 2019 was benign done by   Emiliana.    5.  Mild hyponatremia  Plan  1. Continue tamoxifen  2. Patient will be due for mammogram again in November 2020  3. Follow-up with me in 6 months with labs   4. No plans on giving Prolia.  Discussed Fosamax.  Patient refused.  5. Patient has been advised to exercise regularly.  6. Patient has been advised to increase the sodium content in her diet    Maria M Galicia MD        Cc:   Dr. James Weinberg MD

## 2020-06-04 ENCOUNTER — OFFICE VISIT (OUTPATIENT)
Dept: INTERNAL MEDICINE | Facility: CLINIC | Age: 85
End: 2020-06-04

## 2020-06-04 ENCOUNTER — LAB (OUTPATIENT)
Dept: LAB | Facility: HOSPITAL | Age: 85
End: 2020-06-04

## 2020-06-04 VITALS
HEART RATE: 67 BPM | TEMPERATURE: 97.1 F | DIASTOLIC BLOOD PRESSURE: 70 MMHG | SYSTOLIC BLOOD PRESSURE: 152 MMHG | HEIGHT: 63 IN | BODY MASS INDEX: 29.22 KG/M2 | WEIGHT: 164.9 LBS | OXYGEN SATURATION: 98 %

## 2020-06-04 DIAGNOSIS — I10 ESSENTIAL HYPERTENSION: Primary | ICD-10-CM

## 2020-06-04 DIAGNOSIS — E78.2 MIXED HYPERLIPIDEMIA: ICD-10-CM

## 2020-06-04 DIAGNOSIS — E03.9 ACQUIRED HYPOTHYROIDISM: ICD-10-CM

## 2020-06-04 DIAGNOSIS — E87.1 HYPONATREMIA: ICD-10-CM

## 2020-06-04 DIAGNOSIS — Z00.00 MEDICARE ANNUAL WELLNESS VISIT, SUBSEQUENT: ICD-10-CM

## 2020-06-04 LAB
ALBUMIN SERPL-MCNC: 3.6 G/DL (ref 3.5–5.2)
ALBUMIN/GLOB SERPL: 1.4 G/DL
ALP SERPL-CCNC: 39 U/L (ref 39–117)
ALT SERPL W P-5'-P-CCNC: 19 U/L (ref 1–33)
ANION GAP SERPL CALCULATED.3IONS-SCNC: 10.2 MMOL/L (ref 5–15)
AST SERPL-CCNC: 29 U/L (ref 1–32)
BILIRUB SERPL-MCNC: 0.3 MG/DL (ref 0.2–1.2)
BUN BLD-MCNC: 10 MG/DL (ref 8–23)
BUN/CREAT SERPL: 12.8 (ref 7–25)
CALCIUM SPEC-SCNC: 9 MG/DL (ref 8.6–10.5)
CHLORIDE SERPL-SCNC: 95 MMOL/L (ref 98–107)
CO2 SERPL-SCNC: 24.8 MMOL/L (ref 22–29)
CREAT BLD-MCNC: 0.78 MG/DL (ref 0.57–1)
GFR SERPL CREATININE-BSD FRML MDRD: 70 ML/MIN/1.73
GLOBULIN UR ELPH-MCNC: 2.5 GM/DL
GLUCOSE BLD-MCNC: 120 MG/DL (ref 65–99)
POTASSIUM BLD-SCNC: 4.1 MMOL/L (ref 3.5–5.2)
PROT SERPL-MCNC: 6.1 G/DL (ref 6–8.5)
SODIUM BLD-SCNC: 130 MMOL/L (ref 136–145)
T4 FREE SERPL-MCNC: 1.6 NG/DL (ref 0.93–1.7)
TSH SERPL DL<=0.05 MIU/L-ACNC: 2.15 UIU/ML (ref 0.27–4.2)

## 2020-06-04 PROCEDURE — 80053 COMPREHEN METABOLIC PANEL: CPT | Performed by: FAMILY MEDICINE

## 2020-06-04 PROCEDURE — 84443 ASSAY THYROID STIM HORMONE: CPT | Performed by: FAMILY MEDICINE

## 2020-06-04 PROCEDURE — 99214 OFFICE O/P EST MOD 30 MIN: CPT | Performed by: FAMILY MEDICINE

## 2020-06-04 PROCEDURE — G0439 PPPS, SUBSEQ VISIT: HCPCS | Performed by: FAMILY MEDICINE

## 2020-06-04 PROCEDURE — 84439 ASSAY OF FREE THYROXINE: CPT | Performed by: FAMILY MEDICINE

## 2020-06-04 PROCEDURE — 36415 COLL VENOUS BLD VENIPUNCTURE: CPT | Performed by: FAMILY MEDICINE

## 2020-06-04 NOTE — PROGRESS NOTES
The ABCs of the Annual Wellness Visit  Subsequent Medicare Wellness Visit    Chief Complaint   Patient presents with   • follow up to hypertension   • followup to hyperlipidemia   • follow up to hypothyroidism       Subjective   History of Present Illness:  Marcelle Johnson is a 87 y.o. female who presents for a Subsequent Medicare Wellness Visit.    HEALTH RISK ASSESSMENT    Recent Hospitalizations:  No hospitalization(s) within the last year.    Current Medical Providers:  Patient Care Team:  Jorge Luis Weinberg MD as PCP - General (Family Medicine)  Maria M Galicia MD as PCP - Claims Attributed  Maria M Galicia MD as Consulting Physician (Hematology and Oncology)  James Lee MD as Surgeon (Breast Surgery)  James Lee MD as Referring Physician (Breast Surgery)    Smoking Status:  Social History     Tobacco Use   Smoking Status Former Smoker   • Years: 5.00   • Types: Cigarettes   • Last attempt to quit:    • Years since quittin.4   Smokeless Tobacco Never Used       Alcohol Consumption:  Social History     Substance and Sexual Activity   Alcohol Use No       Depression Screen:   PHQ-2/PHQ-9 Depression Screening 2020   Little interest or pleasure in doing things 0   Feeling down, depressed, or hopeless 0   Trouble falling or staying asleep, or sleeping too much -   Feeling tired or having little energy -   Poor appetite or overeating -   Feeling bad about yourself - or that you are a failure or have let yourself or your family down -   Trouble concentrating on things, such as reading the newspaper or watching television -   Moving or speaking so slowly that other people could have noticed. Or the opposite - being so fidgety or restless that you have been moving around a lot more than usual -   Thoughts that you would be better off dead, or of hurting yourself in some way -   Total Score 0   If you checked off any problems, how difficult have these problems made it for you to do your  work, take care of things at home, or get along with other people? -       Fall Risk Screen:  DAVON Fall Risk Assessment was completed, and patient is at LOW risk for falls.Assessment completed on:6/4/2020    Health Habits and Functional and Cognitive Screening:  Functional & Cognitive Status 6/4/2020   Do you have difficulty preparing food and eating? No   Do you have difficulty bathing yourself, getting dressed or grooming yourself? No   Do you have difficulty using the toilet? No   Do you have difficulty moving around from place to place? No   Do you have trouble with steps or getting out of a bed or a chair? No   Current Diet Well Balanced Diet   Dental Exam Up to date   Eye Exam Up to date   Exercise (times per week) 0 times per week   Current Exercise Activities Include None   Do you need help using the phone?  No   Are you deaf or do you have serious difficulty hearing?  No   Do you need help with transportation? No   Do you need help shopping? No   Do you need help preparing meals?  No   Do you need help with housework?  No   Do you need help with laundry? No   Do you need help taking your medications? No   Do you need help managing money? No   Do you ever drive or ride in a car without wearing a seat belt? No   Have you felt unusual stress, anger or loneliness in the last month? No   Who do you live with? Spouse   If you need help, do you have trouble finding someone available to you? No   Have you been bothered in the last four weeks by sexual problems? No   Do you have difficulty concentrating, remembering or making decisions? No         Does the patient have evidence of cognitive impairment? No    Asprin use counseling:Does not need ASA (and currently is not on it)    Age-appropriate Screening Schedule:  Refer to the list below for future screening recommendations based on patient's age, sex and/or medical conditions. Orders for these recommended tests are listed in the plan section. The patient has  been provided with a written plan.    Health Maintenance   Topic Date Due   • ZOSTER VACCINE (2 of 3) 02/26/2015   • DIABETIC EYE EXAM  08/01/2018   • URINE MICROALBUMIN  08/21/2019   • HEMOGLOBIN A1C  09/05/2019   • INFLUENZA VACCINE  08/01/2020   • LIPID PANEL  09/10/2020   • DXA SCAN  10/19/2020   • MAMMOGRAM  11/13/2021   • TDAP/TD VACCINES (2 - Td) 08/24/2027          The following portions of the patient's history were reviewed and updated as appropriate: allergies, current medications, past family history, past medical history, past social history, past surgical history and problem list.    Outpatient Medications Prior to Visit   Medication Sig Dispense Refill   • Calcium Citrate-Vitamin D (CALCIUM + D PO) Take 1,000 mg by mouth daily.     • cholecalciferol (VITAMIN D3) 25 MCG (1000 UT) tablet Take 1,000 Units by mouth Daily.     • Esomeprazole Magnesium (NEXIUM PO) Take 22.3 mg by mouth every morning.     • FIBER PO Take 2 capsules by mouth Daily As Needed.     • levothyroxine (SYNTHROID, LEVOTHROID) 100 MCG tablet Take 1 tablet by mouth Daily. 90 tablet 0   • losartan-hydrochlorothiazide (HYZAAR) 100-12.5 MG per tablet TAKE 1 TABLET BY MOUTH EVERY DAY 90 tablet 2   • pravastatin (PRAVACHOL) 40 MG tablet Take 1 tablet by mouth Every Night. 90 tablet 2   • tamoxifen (NOLVADEX) 20 MG chemo tablet TAKE 1 TABLET BY MOUTH EVERY DAY 90 tablet 0   • vitamin B-6 (PYRIDOXINE) 50 MG tablet Take 50 mg by mouth Daily.     • vitamin C (ASCORBIC ACID) 500 MG tablet Take 500 mg by mouth Daily As Needed.       No facility-administered medications prior to visit.        Patient Active Problem List   Diagnosis   • Abnormal brain scan   • Abnormal finding on thyroid function test   • Arthritis   • Diabetes mellitus (CMS/HCC)   • Dysphagia   • Mixed hyperlipidemia   • Essential hypertension   • Hypothyroidism   • Osteoporosis   • Borderline diabetes   • Breast cancer of upper-outer quadrant of right female breast (CMS/HCC)   •  "Medicare annual wellness visit, initial   • Hyponatremia   • Other osteoporosis without current pathological fracture   • Esophageal stricture   • Adenomatous polyp of colon   • Malignant neoplasm of colon (CMS/HCC)   • Medicare annual wellness visit, subsequent   • Acute cystitis with hematuria       Advanced Care Planning:  ACP discussion was held with the patient during this visit. Patient has an advance directive in EMR which is still valid.     Review of Systems    Compared to one year ago, the patient feels her physical health is the same.  Compared to one year ago, the patient feels her mental health is the same.    Reviewed chart for potential of high risk medication in the elderly: yes  Reviewed chart for potential of harmful drug interactions in the elderly:yes    Objective         Vitals:    06/04/20 1036   BP: 152/70   BP Location: Right arm   Patient Position: Sitting   Cuff Size: Adult   Pulse: 67   Temp: 97.1 °F (36.2 °C)   TempSrc: Temporal   SpO2: 98%   Weight: 74.8 kg (164 lb 14.4 oz)   Height: 160 cm (62.99\")   PainSc: 0-No pain       Body mass index is 29.22 kg/m².  Discussed the patient's BMI with her. The BMI is above average; BMI management plan is completed.    Physical Exam          Assessment/Plan   Medicare Risks and Personalized Health Plan  CMS Preventative Services Quick Reference  Inactivity/Sedentary    The above risks/problems have been discussed with the patient.  Pertinent information has been shared with the patient in the After Visit Summary.  Follow up plans and orders are seen below in the Assessment/Plan Section.    Diagnoses and all orders for this visit:    1. Essential hypertension (Primary)  -     Comprehensive Metabolic Panel    2. Mixed hyperlipidemia    3. Acquired hypothyroidism  -     TSH  -     T4, Free    4. Hyponatremia    5. Medicare annual wellness visit, subsequent      Follow Up:  Return in about 6 months (around 12/4/2020), or if symptoms worsen or fail to " improve, for Recheck.     An After Visit Summary and PPPS were given to the patient.

## 2020-06-04 NOTE — PROGRESS NOTES
Marcelle Johnson is a 87 y.o. female.      Assessment/Plan   Problem List Items Addressed This Visit        Cardiovascular and Mediastinum    Mixed hyperlipidemia    Essential hypertension - Primary    Relevant Orders    Comprehensive Metabolic Panel       Endocrine    Hypothyroidism    Relevant Orders    TSH    T4, Free       Other    Hyponatremia    Medicare annual wellness visit, subsequent         Follow-up results of blood work for ongoing management of chronic problems    Return in about 6 months (around 2020), or if symptoms worsen or fail to improve, for Recheck.      Chief Complaint   Patient presents with   • follow up to hypertension   • followup to hyperlipidemia   • follow up to hypothyroidism     Social History     Tobacco Use   • Smoking status: Former Smoker     Years: 5.00     Types: Cigarettes     Last attempt to quit: 1966     Years since quittin.4   • Smokeless tobacco: Never Used   Substance Use Topics   • Alcohol use: No   • Drug use: No       History of Present Illness   Patient follow-up appointment to address hypertension hyperlipidemia hypothyroidism she feels her chronic medical problems are fairly well controlled she has no chest pain shortness of breath or increased fatigue she is watching her diet she has some dry skin and needs monitoring of her thyroid function for appropriate replacement therapy no muscle cramps she does not think that she does drinks enough fluid and she is going to work on that  The following portions of the patient's history were reviewed and updated as appropriate:PMHroutine: Social history , Allergies, Current Medications, Active Problem List and Health Maintenance    Review of Systems   Constitutional: Negative.    HENT: Negative.    Eyes: Negative.    Respiratory: Negative.    Cardiovascular: Negative.    Gastrointestinal: Negative.    Genitourinary: Negative.    Musculoskeletal: Negative.    Neurological: Negative.    Hematological: Negative.   "      Objective   Vitals:    06/04/20 1036   BP: 152/70   BP Location: Right arm   Patient Position: Sitting   Cuff Size: Adult   Pulse: 67   Temp: 97.1 °F (36.2 °C)   TempSrc: Temporal   SpO2: 98%   Weight: 74.8 kg (164 lb 14.4 oz)   Height: 160 cm (62.99\")     Body mass index is 29.22 kg/m².  Physical Exam  Reviewed Data:  No visits with results within 1 Month(s) from this visit.   Latest known visit with results is:   Lab on 11/20/2019   Component Date Value Ref Range Status   • Glucose 11/20/2019 118* 65 - 99 mg/dL Final   • BUN 11/20/2019 12  8 - 23 mg/dL Final   • Creatinine 11/20/2019 0.78  0.57 - 1.00 mg/dL Final   • Sodium 11/20/2019 134* 136 - 145 mmol/L Final   • Potassium 11/20/2019 3.8  3.5 - 5.2 mmol/L Final   • Chloride 11/20/2019 95* 98 - 107 mmol/L Final   • CO2 11/20/2019 29.0  22.0 - 29.0 mmol/L Final   • Calcium 11/20/2019 9.3  8.6 - 10.5 mg/dL Final   • Total Protein 11/20/2019 6.7  6.0 - 8.5 g/dL Final   • Albumin 11/20/2019 3.90  3.50 - 5.20 g/dL Final   • ALT (SGPT) 11/20/2019 19  1 - 33 U/L Final   • AST (SGOT) 11/20/2019 24  1 - 32 U/L Final   • Alkaline Phosphatase 11/20/2019 58  39 - 117 U/L Final   • Total Bilirubin 11/20/2019 0.2  0.1 - 1.2 mg/dL Final   • eGFR Non  Amer 11/20/2019 70  >60 mL/min/1.73 Final   • Globulin 11/20/2019 2.8  gm/dL Final   • A/G Ratio 11/20/2019 1.4  g/dL Final   • BUN/Creatinine Ratio 11/20/2019 15.4  7.0 - 25.0 Final   • Anion Gap 11/20/2019 10.0  5.0 - 15.0 mmol/L Final   • WBC 11/20/2019 10.39  3.40 - 10.80 10*3/mm3 Final   • RBC 11/20/2019 4.26  3.77 - 5.28 10*6/mm3 Final   • Hemoglobin 11/20/2019 12.5  12.0 - 15.9 g/dL Final   • Hematocrit 11/20/2019 37.2  34.0 - 46.6 % Final   • MCV 11/20/2019 87.3  79.0 - 97.0 fL Final   • MCH 11/20/2019 29.3  26.6 - 33.0 pg Final   • MCHC 11/20/2019 33.6  31.5 - 35.7 g/dL Final   • RDW 11/20/2019 12.8  12.3 - 15.4 % Final   • RDW-SD 11/20/2019 41.1  37.0 - 54.0 fl Final   • MPV 11/20/2019 10.1  6.0 - 12.0 fL " Final   • Platelets 11/20/2019 165  140 - 450 10*3/mm3 Final   • Neutrophil % 11/20/2019 46.1  42.7 - 76.0 % Final   • Lymphocyte % 11/20/2019 45.4* 19.6 - 45.3 % Final   • Monocyte % 11/20/2019 6.3  5.0 - 12.0 % Final   • Eosinophil % 11/20/2019 1.3  0.3 - 6.2 % Final   • Basophil % 11/20/2019 0.6  0.0 - 1.5 % Final   • Immature Grans % 11/20/2019 0.3  0.0 - 0.5 % Final   • Neutrophils, Absolute 11/20/2019 4.80  1.70 - 7.00 10*3/mm3 Final   • Lymphocytes, Absolute 11/20/2019 4.72* 0.70 - 3.10 10*3/mm3 Final   • Monocytes, Absolute 11/20/2019 0.65  0.10 - 0.90 10*3/mm3 Final   • Eosinophils, Absolute 11/20/2019 0.13  0.00 - 0.40 10*3/mm3 Final   • Basophils, Absolute 11/20/2019 0.06  0.00 - 0.20 10*3/mm3 Final   • Immature Grans, Absolute 11/20/2019 0.03  0.00 - 0.05 10*3/mm3 Final   • nRBC 11/20/2019 0.0  0.0 - 0.2 /100 WBC Final

## 2020-06-05 RX ORDER — LOSARTAN POTASSIUM 100 MG/1
100 TABLET ORAL DAILY
Qty: 90 TABLET | Refills: 1 | Status: SHIPPED | OUTPATIENT
Start: 2020-06-05 | End: 2020-11-19

## 2020-06-10 DIAGNOSIS — R79.89 LOW SERUM SODIUM: Primary | ICD-10-CM

## 2020-07-21 ENCOUNTER — OFFICE VISIT (OUTPATIENT)
Dept: GASTROENTEROLOGY | Facility: CLINIC | Age: 85
End: 2020-07-21

## 2020-07-21 VITALS — BODY MASS INDEX: 29.88 KG/M2 | WEIGHT: 168.6 LBS | HEIGHT: 63 IN | TEMPERATURE: 97.8 F

## 2020-07-21 DIAGNOSIS — D12.6 ADENOMATOUS POLYP OF COLON, UNSPECIFIED PART OF COLON: ICD-10-CM

## 2020-07-21 DIAGNOSIS — R13.10 DYSPHAGIA, UNSPECIFIED TYPE: Primary | ICD-10-CM

## 2020-07-21 DIAGNOSIS — C18.9 MALIGNANT NEOPLASM OF COLON, UNSPECIFIED PART OF COLON (HCC): ICD-10-CM

## 2020-07-21 PROCEDURE — 99213 OFFICE O/P EST LOW 20 MIN: CPT | Performed by: INTERNAL MEDICINE

## 2020-07-21 NOTE — PROGRESS NOTES
Chief Complaint   Patient presents with   • Difficulty Swallowing        Marcelle Johnson is a  87 y.o. female here for a follow up visit for dysphagia, history of colon cancer, history of polyps    HPI this 87-year-old white female patient of Dr. Jorge Luis Weinberg returns in follow-up since she was last seen 1 year ago.  She denies any current problems with pill dysphagia or with food sticking but she does complain of gagging sensation which she attributes to excess phlegm in her throat.  We talked about management of this with antihistamines and if the problem persists or worsens can refer her for video fluoroscopy swallow study as well as speech pathology evaluation.  I do not believe that her proximal esophageal stricture is a problem right now given her ability to eat and drink without difficulty.  Nonetheless, I did offer her follow-up at that area if needed.  She did describe some issues with a partial plate and is going to discuss this further with her dentist later this month.  She wishes to defer any further studies at present.  We talked about her history of colon cancer as well as personal history of polyps.  She also wishes to defer any further colonoscopy at this time.  I advised she can follow-up with me on an as-needed basis and she is comfortable with this.    Past Medical History:   Diagnosis Date   • Breast cancer of upper-outer quadrant of right female breast (CMS/HCC) 10/13/2016   • Colon cancer (CMS/HCC)     had colon resection   • Diabetes (CMS/HCC)     borderline   • Diverticulosis    • Esophageal stricture     had it stretched via scope   • High cholesterol    • History of bacterial pneumonia     SEVERAL YEARS AGO   • Hypertension    • Hypothyroid    • LBBB (left bundle branch block)    • Sleep trouble        Current Outpatient Medications   Medication Sig Dispense Refill   • Calcium Citrate-Vitamin D (CALCIUM + D PO) Take 1,000 mg by mouth daily.     • cholecalciferol (VITAMIN D3) 25 MCG (1000 UT)  tablet Take 1,000 Units by mouth Daily.     • Esomeprazole Magnesium (NEXIUM PO) Take 22.3 mg by mouth every morning.     • FIBER PO Take 2 capsules by mouth Daily As Needed.     • levothyroxine (SYNTHROID, LEVOTHROID) 100 MCG tablet Take 1 tablet by mouth Daily. 90 tablet 0   • losartan (Cozaar) 100 MG tablet Take 1 tablet by mouth Daily. 90 tablet 1   • tamoxifen (NOLVADEX) 20 MG chemo tablet TAKE 1 TABLET BY MOUTH EVERY DAY 90 tablet 0   • vitamin B-6 (PYRIDOXINE) 50 MG tablet Take 50 mg by mouth Daily.     • vitamin C (ASCORBIC ACID) 500 MG tablet Take 500 mg by mouth Daily As Needed.     • pravastatin (PRAVACHOL) 40 MG tablet Take 1 tablet by mouth Every Night. 90 tablet 2     No current facility-administered medications for this visit.        PRN Meds:.    Allergies   Allergen Reactions   • Morphine And Related Nausea And Vomiting       Social History     Socioeconomic History   • Marital status:      Spouse name: Not on file   • Number of children: 2   • Years of education: High school   • Highest education level: Not on file   Occupational History     Employer: RETIRED   Tobacco Use   • Smoking status: Former Smoker     Years: 5.00     Types: Cigarettes     Last attempt to quit: 1966     Years since quittin.5   • Smokeless tobacco: Never Used   Substance and Sexual Activity   • Alcohol use: No   • Drug use: No   • Sexual activity: Defer       Family History   Problem Relation Age of Onset   • Diabetes Mother    • No Known Problems Father    • No Known Problems Sister    • No Known Problems Brother    • No Known Problems Daughter    • No Known Problems Son    • No Known Problems Maternal Grandmother    • No Known Problems Paternal Grandmother    • No Known Problems Maternal Aunt    • No Known Problems Paternal Aunt    • BRCA 1/2 Neg Hx    • Breast cancer Neg Hx    • Colon cancer Neg Hx    • Endometrial cancer Neg Hx    • Ovarian cancer Neg Hx    • Malig Hyperthermia Neg Hx        Review of Systems    Constitutional: Negative for activity change, appetite change, fatigue and unexpected weight change.   HENT: Negative for congestion, facial swelling, sore throat, trouble swallowing and voice change.    Eyes: Negative for photophobia and visual disturbance.   Respiratory: Negative for cough and choking.    Cardiovascular: Negative for chest pain.   Gastrointestinal: Negative for abdominal distention, abdominal pain, anal bleeding, blood in stool, constipation, diarrhea, nausea, rectal pain and vomiting.        Dysphagia   Endocrine: Negative for polyphagia.   Musculoskeletal: Negative for arthralgias, gait problem and joint swelling.   Skin: Negative for color change, pallor and rash.   Allergic/Immunologic: Negative for food allergies.   Neurological: Negative for speech difficulty and headaches.   Hematological: Does not bruise/bleed easily.   Psychiatric/Behavioral: Negative for agitation, confusion and sleep disturbance.       Vitals:    07/21/20 1044   Temp: 97.8 °F (36.6 °C)       Physical Exam   Constitutional: She is oriented to person, place, and time. She appears well-developed and well-nourished.   HENT:   Head: Normocephalic.   Mouth/Throat: Oropharynx is clear and moist.   Eyes: Conjunctivae and EOM are normal.   Neck: Normal range of motion.   Cardiovascular: Normal rate and regular rhythm.   Pulmonary/Chest: Breath sounds normal.   Abdominal: Soft. Bowel sounds are normal.   Musculoskeletal: Normal range of motion.   Neurological: She is alert and oriented to person, place, and time.   Skin: Skin is warm and dry.   Psychiatric: She has a normal mood and affect. Her behavior is normal.       ASSESSMENT   #1 dysphagia: Relatively stable, tolerating pills and solids without difficulty  #2 gagging sensation associated with phlegm  #3 personal history of colon cancer  #4 personal history of colon polyps      PLAN  Patient to schedule follow-up as needed  Advised to call with any further GI related  complaints.      ICD-10-CM ICD-9-CM   1. Dysphagia, unspecified type R13.10 787.20   2. Adenomatous polyp of colon, unspecified part of colon D12.6 211.3   3. Malignant neoplasm of colon, unspecified part of colon (CMS/HCC) C18.9 153.9

## 2020-08-11 ENCOUNTER — LAB (OUTPATIENT)
Dept: LAB | Facility: HOSPITAL | Age: 85
End: 2020-08-11

## 2020-08-11 DIAGNOSIS — R79.89 LOW SERUM SODIUM: ICD-10-CM

## 2020-08-11 LAB
ANION GAP SERPL CALCULATED.3IONS-SCNC: 8.9 MMOL/L (ref 5–15)
BUN SERPL-MCNC: 10 MG/DL (ref 8–23)
BUN/CREAT SERPL: 15.2 (ref 7–25)
CALCIUM SPEC-SCNC: 8.9 MG/DL (ref 8.6–10.5)
CHLORIDE SERPL-SCNC: 104 MMOL/L (ref 98–107)
CO2 SERPL-SCNC: 23.1 MMOL/L (ref 22–29)
CREAT SERPL-MCNC: 0.66 MG/DL (ref 0.57–1)
GFR SERPL CREATININE-BSD FRML MDRD: 85 ML/MIN/1.73
GLUCOSE SERPL-MCNC: 130 MG/DL (ref 65–99)
POTASSIUM SERPL-SCNC: 4.1 MMOL/L (ref 3.5–5.2)
SODIUM SERPL-SCNC: 136 MMOL/L (ref 136–145)

## 2020-08-11 PROCEDURE — 36415 COLL VENOUS BLD VENIPUNCTURE: CPT

## 2020-08-11 PROCEDURE — 80048 BASIC METABOLIC PNL TOTAL CA: CPT | Performed by: FAMILY MEDICINE

## 2020-08-17 RX ORDER — TAMOXIFEN CITRATE 20 MG/1
TABLET ORAL
Qty: 90 TABLET | Refills: 1 | Status: SHIPPED | OUTPATIENT
Start: 2020-08-17 | End: 2021-02-15

## 2020-08-20 RX ORDER — LOSARTAN POTASSIUM AND HYDROCHLOROTHIAZIDE 12.5; 1 MG/1; MG/1
TABLET ORAL
Qty: 90 TABLET | Refills: 2 | OUTPATIENT
Start: 2020-08-20

## 2020-11-10 ENCOUNTER — TRANSCRIBE ORDERS (OUTPATIENT)
Dept: ADMINISTRATIVE | Facility: HOSPITAL | Age: 85
End: 2020-11-10

## 2020-11-10 DIAGNOSIS — Z12.39 SCREENING BREAST EXAMINATION: Primary | ICD-10-CM

## 2020-11-11 ENCOUNTER — OFFICE VISIT (OUTPATIENT)
Dept: ONCOLOGY | Facility: CLINIC | Age: 85
End: 2020-11-11

## 2020-11-11 ENCOUNTER — TELEPHONE (OUTPATIENT)
Dept: GENERAL RADIOLOGY | Facility: HOSPITAL | Age: 85
End: 2020-11-11

## 2020-11-11 ENCOUNTER — LAB (OUTPATIENT)
Dept: OTHER | Facility: HOSPITAL | Age: 85
End: 2020-11-11

## 2020-11-11 VITALS
WEIGHT: 169.5 LBS | BODY MASS INDEX: 30.03 KG/M2 | RESPIRATION RATE: 18 BRPM | OXYGEN SATURATION: 98 % | HEART RATE: 84 BPM | SYSTOLIC BLOOD PRESSURE: 146 MMHG | TEMPERATURE: 97.5 F | HEIGHT: 63 IN | DIASTOLIC BLOOD PRESSURE: 72 MMHG

## 2020-11-11 DIAGNOSIS — C50.411 MALIGNANT NEOPLASM OF UPPER-OUTER QUADRANT OF RIGHT FEMALE BREAST, UNSPECIFIED ESTROGEN RECEPTOR STATUS (HCC): ICD-10-CM

## 2020-11-11 DIAGNOSIS — C50.411 MALIGNANT NEOPLASM OF UPPER-OUTER QUADRANT OF RIGHT FEMALE BREAST, UNSPECIFIED ESTROGEN RECEPTOR STATUS (HCC): Primary | ICD-10-CM

## 2020-11-11 LAB
ALBUMIN SERPL-MCNC: 3.5 G/DL (ref 3.5–5.2)
ALBUMIN/GLOB SERPL: 1.3 G/DL
ALP SERPL-CCNC: 64 U/L (ref 39–117)
ALT SERPL W P-5'-P-CCNC: 18 U/L (ref 1–33)
ANION GAP SERPL CALCULATED.3IONS-SCNC: 4.2 MMOL/L (ref 5–15)
AST SERPL-CCNC: 27 U/L (ref 1–32)
BASOPHILS # BLD AUTO: 0.05 10*3/MM3 (ref 0–0.2)
BASOPHILS NFR BLD AUTO: 0.6 % (ref 0–1.5)
BILIRUB SERPL-MCNC: 0.2 MG/DL (ref 0–1.2)
BUN SERPL-MCNC: 7 MG/DL (ref 8–23)
BUN/CREAT SERPL: 9.5 (ref 7–25)
CALCIUM SPEC-SCNC: 8.9 MG/DL (ref 8.6–10.5)
CHLORIDE SERPL-SCNC: 101 MMOL/L (ref 98–107)
CO2 SERPL-SCNC: 29.8 MMOL/L (ref 22–29)
CREAT SERPL-MCNC: 0.74 MG/DL (ref 0.57–1)
DEPRECATED RDW RBC AUTO: 41.2 FL (ref 37–54)
EOSINOPHIL # BLD AUTO: 0.19 10*3/MM3 (ref 0–0.4)
EOSINOPHIL NFR BLD AUTO: 2.1 % (ref 0.3–6.2)
ERYTHROCYTE [DISTWIDTH] IN BLOOD BY AUTOMATED COUNT: 12.8 % (ref 12.3–15.4)
GFR SERPL CREATININE-BSD FRML MDRD: 74 ML/MIN/1.73
GLOBULIN UR ELPH-MCNC: 2.6 GM/DL
GLUCOSE SERPL-MCNC: 157 MG/DL (ref 65–99)
HCT VFR BLD AUTO: 37.3 % (ref 34–46.6)
HGB BLD-MCNC: 12.6 G/DL (ref 12–15.9)
IMM GRANULOCYTES # BLD AUTO: 0.01 10*3/MM3 (ref 0–0.05)
IMM GRANULOCYTES NFR BLD AUTO: 0.1 % (ref 0–0.5)
LYMPHOCYTES # BLD AUTO: 4.24 10*3/MM3 (ref 0.7–3.1)
LYMPHOCYTES NFR BLD AUTO: 46.7 % (ref 19.6–45.3)
MCH RBC QN AUTO: 29.6 PG (ref 26.6–33)
MCHC RBC AUTO-ENTMCNC: 33.8 G/DL (ref 31.5–35.7)
MCV RBC AUTO: 87.8 FL (ref 79–97)
MONOCYTES # BLD AUTO: 0.54 10*3/MM3 (ref 0.1–0.9)
MONOCYTES NFR BLD AUTO: 6 % (ref 5–12)
NEUTROPHILS NFR BLD AUTO: 4.04 10*3/MM3 (ref 1.7–7)
NEUTROPHILS NFR BLD AUTO: 44.5 % (ref 42.7–76)
NRBC BLD AUTO-RTO: 0 /100 WBC (ref 0–0.2)
PLATELET # BLD AUTO: 155 10*3/MM3 (ref 140–450)
PMV BLD AUTO: 10.4 FL (ref 6–12)
POTASSIUM SERPL-SCNC: 4.3 MMOL/L (ref 3.5–5.2)
PROT SERPL-MCNC: 6.1 G/DL (ref 6–8.5)
RBC # BLD AUTO: 4.25 10*6/MM3 (ref 3.77–5.28)
SODIUM SERPL-SCNC: 135 MMOL/L (ref 136–145)
WBC # BLD AUTO: 9.07 10*3/MM3 (ref 3.4–10.8)

## 2020-11-11 PROCEDURE — 80053 COMPREHEN METABOLIC PANEL: CPT | Performed by: INTERNAL MEDICINE

## 2020-11-11 PROCEDURE — 36415 COLL VENOUS BLD VENIPUNCTURE: CPT

## 2020-11-11 PROCEDURE — 99214 OFFICE O/P EST MOD 30 MIN: CPT | Performed by: INTERNAL MEDICINE

## 2020-11-11 PROCEDURE — 85025 COMPLETE CBC W/AUTO DIFF WBC: CPT | Performed by: INTERNAL MEDICINE

## 2020-11-11 RX ORDER — INFLUENZA A VIRUS A/MICHIGAN/45/2015 X-275 (H1N1) ANTIGEN (FORMALDEHYDE INACTIVATED), INFLUENZA A VIRUS A/SINGAPORE/INFIMH-16-0019/2016 IVR-186 (H3N2) ANTIGEN (FORMALDEHYDE INACTIVATED), INFLUENZA B VIRUS B/PHUKET/3073/2013 ANTIGEN (FORMALDEHYDE INACTIVATED), AND INFLUENZA B VIRUS B/MARYLAND/15/2016 BX-69A ANTIGEN (FORMALDEHYDE INACTIVATED) 60; 60; 60; 60 UG/.7ML; UG/.7ML; UG/.7ML; UG/.7ML
INJECTION, SUSPENSION INTRAMUSCULAR
COMMUNITY
Start: 2020-10-01 | End: 2020-12-04

## 2020-11-11 NOTE — TELEPHONE ENCOUNTER
----- Message from Maria M Galicia MD sent at 11/11/2020  5:06 PM EST -----  Please make sure she is scheduled for mammogram end of November 2020.  And call her with appointment  Maria M Galicia MD

## 2020-11-11 NOTE — PROGRESS NOTES
Subjective .    REASONS FOR FOLLOWUP: 1.  [id number] invasive ductal carcinoma of the right breast, estrogen receptor positive greater than 90%, progesterone receptor 90%, HER-2/breanne 2+ on immunohistochemistry and negative by fish  2.  Osteoporosis  3. arimidex held secondary to arthralgias.  4. Side effect to prolia, patient complained of jaw pain secondary to Prolia and refused any bisphosphonates  5.  Arimidex discontinued with resolution of arthralgias.  6.  Patient started on tamoxifen as of July 6, 2017.    HISTORY OF PRESENT ILLNESS:  The patient is a 87 y.o. year old female who is here for follow-up with the above-mentioned history.    History of Present Illness   patient is a 85-year-old female with a above history. She continues to take tamoxifen daily as prescribed with good tolerance.      Overall, patient reports feeling well.  She has not lost weight and spends most of her time taking care of her .  She saw Dr. Hopson for a colonoscopy which was normal apart from some hemorrhoids, and will see him again in 5 years.    She is still taking Tamoxifen and is tolerating it well.      Patient's labs from 11/20/19 are normal.    We reviewed with patient the screening mammogram from 11/13/19 which was benign-negative.    Interval history: Patient is tolerating tamoxifen.  She does not have any hot flashes.  She has not developed any DVT.  She denies any active bleeding.    She denies any complaints currently.  Patient had mammogram November 30, 2020 and it was negative.  He had fat necrosis.    Past Medical History:   Diagnosis Date   • Breast cancer of upper-outer quadrant of right female breast (CMS/HCC) 10/13/2016   • Colon cancer (CMS/HCC)     had colon resection   • Diabetes (CMS/HCC)     borderline   • Diverticulosis    • Esophageal stricture     had it stretched via scope   • High cholesterol    • History of bacterial pneumonia     SEVERAL YEARS AGO   • Hypertension    • Hypothyroid    • LBBB  (left bundle branch block)    • Sleep trouble        ONCOLOGIC HISTORY:    patient is a 83-year-old female who states that she noticed a lump in the upper outer quadrant of the right breast over the last 2 months. She thinks it was the size of a dime. She then underwent a mammogram on 8/3/2016 which showed a 1 cm mass in the middle third upper outer quadrant of the right breast. Additionly  there was a small asymmetry in the left breast. Spot compression showed partial resolution of this area in the left breast. This measures 0.8 cm. No evidence of axillary adenopathy seen.     Ultrasound was done which shows at 10 o'clock position in the right breast there is a 0.9 cm irregular heterogeneous mass. But no abnormality is seen in the left breast. A six-month follow-up on the left breast is suggested.     Patient then underwent ultrasound-guided biopsy on 8/17/2016. Allergies shows invasive mammary carcinoma ductal type with focal lobular features in the right breast, Denver score of 5 out of 9. There is no angiolymphatic invasion seen. Maximum dimension of invasive carcinoma in 7 mm. No carcinoma in situ identified. Estrogen receptors were greater than 90%, progesterone receptor is 90%, HER-2/breanne was 2+ on immunohistochemistry and negative by fish.     MRI of the breast shows middle third upper quadrant of the right breast at 10 o'clock position the biopsy cavity measured 1.1 cm from superior to inferior dimension 1.3 cm in anterior to posterior dimension and 2.1 cm from medial to lateral dimension. There were no other areas of enhancement in the left breast.  Chest x-ray no acute infiltrate.     Patient underwent lumpectomy on the right side with sentinel lymph node biopsy by Dr. Lee on 9/19/2016. Patient had invasive carcinoma 1.2 cm with lobular features. The overall grade is grade 2 with her Nadira score of 6 out of 9. He had focal ductal carcinoma in situ and focal atypical lobular hyperplasia as  well. The ductal carcinoma in situ was less than 1 mm, cribriform pattern and low nuclear grade grade 1. The margins were indeterminate for invasive carcinoma but uninvolved by DCIS. Patient has had reexcised margins and invasive carcinoma is present only in the reexcised anterior margin. The tumor focally extends to within 1 mm of the reexcised anterior margin. The fibroinflammatory biopsy skin changes extend to the anterior margin. I will need to discuss with the pathologist about the margins. She has extends to within 3 mm was to margins and to within 5 mm in the inferior margin. The number of sentinel lymph nodes examined is to and there is no evidence of my or macro metastasis and no isolated tumor cells.     Patient now has been referred to radiation oncology and us for further options of treatment.  Given her age, her low stage, her strong estrogen and progesterone receptor positivity Dr. Nolasco felt that she does not need to undergo radiation.    Patient's bone density done shows osteoporosis.  Discussion done about consideration of tamoxifen versus Arimidex with prolia.  Patient refuses tamoxifen and is willing to consider Arimidex.  We did discuss that it'll be important for her to take prolia , calcium and vitamin D.  Pt could not tolerate prolia. Prolia stopped on 06/12/2019    Pt on arimidex  and likely change to tamoxifen. Due to side effects    Arimidex discontinued secondary to severe arthralgias.  Tamoxifen started as of July 6, 2017.      Past medical history: Patient was diagnosed with the colon cancer back in 1989 and at the same time she had her ovaries removed and appendix and gallbladder removed. She didn't require any chemotherapy at the time.  Patient had pneumonia back in 1995.  Her hypertension  High cholesterol  Hypothyroidism  Patient has had colonoscopy 5 years ago by Dr. Tanner and apparently had a polyp she was to return to him for another colonoscopy in 5 years.  Patient had  esophageal stricture as a result she underwent dilation by Dr. Hopson last year.  She underwent parathyroidectomy.  She has borderline diabetes. On diet control     OB/GYN history she started metastases. At age 12, had menopause at age 50 when she underwent oophorectomy. She is  2 para 2 no miscarriages she has 2 children one is 54 and second one is 51-year-old and one grandchild.       Current Outpatient Medications on File Prior to Visit   Medication Sig Dispense Refill   • Calcium Citrate-Vitamin D (CALCIUM + D PO) Take 1,000 mg by mouth daily.     • cholecalciferol (VITAMIN D3) 25 MCG (1000 UT) tablet Take 1,000 Units by mouth Daily.     • Esomeprazole Magnesium (NEXIUM PO) Take 22.3 mg by mouth every morning.     • FIBER PO Take 2 capsules by mouth Daily As Needed.     • Fluzone High-Dose Quadrivalent 0.7 ML suspension prefilled syringe PHARMACY ADMINISTERED     • levothyroxine (SYNTHROID, LEVOTHROID) 100 MCG tablet Take 1 tablet by mouth Daily. 90 tablet 0   • losartan (Cozaar) 100 MG tablet Take 1 tablet by mouth Daily. 90 tablet 1   • pravastatin (PRAVACHOL) 40 MG tablet Take 1 tablet by mouth Every Night. 90 tablet 2   • tamoxifen (NOLVADEX) 20 MG chemo tablet TAKE 1 TABLET BY MOUTH EVERY DAY 90 tablet 1   • vitamin B-6 (PYRIDOXINE) 50 MG tablet Take 50 mg by mouth Daily.     • vitamin C (ASCORBIC ACID) 500 MG tablet Take 500 mg by mouth Daily As Needed.       No current facility-administered medications on file prior to visit.        ALLERGIES:     Allergies   Allergen Reactions   • Morphine And Related Nausea And Vomiting       Social History     Socioeconomic History   • Marital status:      Spouse name: Not on file   • Number of children: 2   • Years of education: High school   • Highest education level: Not on file   Occupational History     Employer: RETIRED   Tobacco Use   • Smoking status: Former Smoker     Years: 5.00     Types: Cigarettes     Quit date: 1966     Years since  "quittin.8   • Smokeless tobacco: Never Used   Substance and Sexual Activity   • Alcohol use: No   • Drug use: No   • Sexual activity: Defer         Cancer-related family history is negative for Breast cancer, Colon cancer, Endometrial cancer, and Ovarian cancer.     Review of Systems   Constitutional: Negative for appetite change, chills, diaphoresis, fatigue, fever and unexpected weight change.   HENT: Negative for hearing loss, sore throat and trouble swallowing.         See HPI   Respiratory: Negative for cough, chest tightness, shortness of breath and wheezing.    Cardiovascular: Negative for chest pain, palpitations and leg swelling.   Gastrointestinal: Negative for abdominal distention, abdominal pain, constipation, diarrhea, nausea and vomiting.   Genitourinary: Negative for dysuria, frequency, hematuria and urgency.   Musculoskeletal: Negative for joint swelling.        No muscle weakness.   Skin: Negative for rash and wound.   Neurological: Negative for seizures, syncope, speech difficulty, weakness, numbness and headaches.   Hematological: Negative for adenopathy. Does not bruise/bleed easily.   Psychiatric/Behavioral: Negative for behavioral problems, confusion and suicidal ideas.   All other systems reviewed and are negative.    I have reviewed and confirmed the accuracy of the ROS as documented by the MA/LPN/RN Maria M Galicia MD        Objective      Vitals:    20 1359   BP: 146/72   Pulse: 84   Resp: 18   Temp: 97.5 °F (36.4 °C)   TempSrc: Skin   SpO2: 98%   Weight: 76.9 kg (169 lb 8 oz)   Height: 160 cm (62.99\")   PainSc: 0-No pain     Current Status 2020   ECOG score 0     Physical Exam    GENERAL:  Well-developed, well-nourished in no acute distress.   SKIN:  Warm, dry without rashes, purpura or petechiae.  NECK:  Supple with good range of motion; no thyromegaly or masses, no JVD.  LYMPHATICS:  No cervical, supraclavicular, axillary or inguinal adenopathy.  CHEST:  Lungs clear to " auscultation. Good airflow.   BREAST: Right breast: S/p right lumpectomy. No skin changes, no evidence of breast mass, no nipple discharge, no evidence of any right axillary adenopathy or right supraclavicular adenopathy  Left breast: No evidence of any skin changes, no evidence of any left breast mass and no evidence of left nipple discharge as well as no left axillary adenopathy or left supraclavicular adenopathy.  CARDIAC:  Regular rate and rhythm without murmurs, rubs or gallops. Normal S1,S2.  ABDOMEN:  Soft, nontender with no hepatosplenomegaly or masses.  EXTREMITIES:  No clubbing, cyanosis or edema.  I have reexamined the patient and the results are consistent with the previously documented exam. Maria M Galicia MD       RECENT LABS:  Hematology WBC   Date Value Ref Range Status   2020 9.07 3.40 - 10.80 10*3/mm3 Final     RBC   Date Value Ref Range Status   2020 4.25 3.77 - 5.28 10*6/mm3 Final   2016 4.63  Final     Hemoglobin   Date Value Ref Range Status   2020 12.6 12.0 - 15.9 g/dL Final     Hematocrit   Date Value Ref Range Status   2020 37.3 34.0 - 46.6 % Final     Platelets   Date Value Ref Range Status   2020 155 140 - 450 10*3/mm3 Final      IMAGIN19 - Screening Bilateral Mammogram  Impression: Benign    Assessment/Plan    1. T1 cN0 M0 invasive ductal carcinoma of the right breast, with lobular features, ER positive at 90%, VT positive at 90%, HER-2/breanne 2+ by minimal histochemistry and negative by fish with a HER-2/CEP ratio of 1.1.    · She is on tamoxifen daily and tolerating well.  · Mammogram from 19 was negative.  Mammogram due 2020  · Reviewed mammogram from 2019 and is negative    2. Osteoporosis:  She is on calcium 1200 mg once daily, vitamin D 1000 international units daily as well.  Patient will not be getting Prolia from now on due to issues with her gums.  She refused Fosamax    3. Benign appearing esophageal  stenosis: dilated by Dr. Hopson on 03/29/2019    4. Colonoscopy: done on 03/29/2019 was benign done by Dr. Hopson.    5.  Mild hyponatremia  Plan  1. Patient continue tamoxifen without any issues.  2. We will schedule mammogram but and 2020  3. Patient to continue calcium and vitamin D supplements  4. Patient has refused both Prolia and Fosamax  5. Follow-up with me in 6 months with labs    Maria M Galicia MD        Cc:   Dr. James Weinberg MD

## 2020-11-11 NOTE — TELEPHONE ENCOUNTER
SPOKE WITH DR TURCIOS AND ADVISED HER THAT THIS WAS THE FIRST AVAILABLE THAT THEY HAD THAT THE PT IS OK WITH THIS APPT DATE AND TIME - CALLED PT AND LVM OF HER APPT THAT HAD BEEN SCHEDULED - [facility]Y IN SCHEDULING WAS ABLE TO MOVE TO 11/20/20 - I CALLED PT BACK TO LET HER KNOW OF HER APPT BEING AT THE [facility] INSTEAD OF [place]

## 2020-11-19 RX ORDER — LOSARTAN POTASSIUM 100 MG/1
TABLET ORAL
Qty: 90 TABLET | Refills: 1 | Status: SHIPPED | OUTPATIENT
Start: 2020-11-19 | End: 2021-03-01

## 2020-11-20 ENCOUNTER — HOSPITAL ENCOUNTER (OUTPATIENT)
Dept: MAMMOGRAPHY | Facility: HOSPITAL | Age: 85
Discharge: HOME | End: 2020-11-20
Admitting: INTERNAL MEDICINE

## 2020-11-20 DIAGNOSIS — Z12.39 SCREENING BREAST EXAMINATION: ICD-10-CM

## 2020-11-20 PROCEDURE — 77067 SCR MAMMO BI INCL CAD: CPT

## 2020-11-20 PROCEDURE — 77063 BREAST TOMOSYNTHESIS BI: CPT

## 2020-12-04 ENCOUNTER — OFFICE VISIT (OUTPATIENT)
Dept: INTERNAL MEDICINE | Facility: CLINIC | Age: 85
End: 2020-12-04

## 2020-12-04 ENCOUNTER — LAB (OUTPATIENT)
Dept: LAB | Facility: HOSPITAL | Age: 85
End: 2020-12-04

## 2020-12-04 VITALS
HEART RATE: 73 BPM | HEIGHT: 63 IN | OXYGEN SATURATION: 98 % | WEIGHT: 167 LBS | SYSTOLIC BLOOD PRESSURE: 182 MMHG | DIASTOLIC BLOOD PRESSURE: 88 MMHG | BODY MASS INDEX: 29.59 KG/M2

## 2020-12-04 DIAGNOSIS — I10 ESSENTIAL HYPERTENSION: ICD-10-CM

## 2020-12-04 DIAGNOSIS — E03.9 ACQUIRED HYPOTHYROIDISM: ICD-10-CM

## 2020-12-04 DIAGNOSIS — R73.03 BORDERLINE DIABETES: ICD-10-CM

## 2020-12-04 DIAGNOSIS — M80.00XD OSTEOPOROSIS WITH CURRENT PATHOLOGICAL FRACTURE WITH ROUTINE HEALING, UNSPECIFIED OSTEOPOROSIS TYPE, SUBSEQUENT ENCOUNTER: ICD-10-CM

## 2020-12-04 DIAGNOSIS — E78.2 MIXED HYPERLIPIDEMIA: Primary | ICD-10-CM

## 2020-12-04 DIAGNOSIS — N39.41 URGE INCONTINENCE OF URINE: ICD-10-CM

## 2020-12-04 DIAGNOSIS — R93.6 ABNORMAL FINDINGS ON DIAGNOSTIC IMAGING OF LIMBS: ICD-10-CM

## 2020-12-04 LAB
ALBUMIN SERPL-MCNC: 3.9 G/DL (ref 3.5–5.2)
ALBUMIN UR-MCNC: 12.9 MG/DL
ALBUMIN/GLOB SERPL: 1.6 G/DL
ALP SERPL-CCNC: 51 U/L (ref 39–117)
ALT SERPL W P-5'-P-CCNC: 18 U/L (ref 1–33)
ANION GAP SERPL CALCULATED.3IONS-SCNC: 9.2 MMOL/L (ref 5–15)
AST SERPL-CCNC: 29 U/L (ref 1–32)
BILIRUB SERPL-MCNC: 0.3 MG/DL (ref 0–1.2)
BUN SERPL-MCNC: 9 MG/DL (ref 8–23)
BUN/CREAT SERPL: 11.1 (ref 7–25)
CALCIUM SPEC-SCNC: 9 MG/DL (ref 8.6–10.5)
CHLORIDE SERPL-SCNC: 101 MMOL/L (ref 98–107)
CHOLEST SERPL-MCNC: 163 MG/DL (ref 0–200)
CO2 SERPL-SCNC: 25.8 MMOL/L (ref 22–29)
CREAT SERPL-MCNC: 0.81 MG/DL (ref 0.57–1)
CREAT UR-MCNC: 119.3 MG/DL
GFR SERPL CREATININE-BSD FRML MDRD: 67 ML/MIN/1.73
GLOBULIN UR ELPH-MCNC: 2.5 GM/DL
GLUCOSE SERPL-MCNC: 125 MG/DL (ref 65–99)
HBA1C MFR BLD: 6.97 % (ref 4.8–5.6)
HDLC SERPL-MCNC: 51 MG/DL (ref 40–60)
LDLC SERPL CALC-MCNC: 92 MG/DL (ref 0–100)
LDLC/HDLC SERPL: 1.76 {RATIO}
MICROALBUMIN/CREAT UR: 108.1 MG/G
POTASSIUM SERPL-SCNC: 4.2 MMOL/L (ref 3.5–5.2)
PROT SERPL-MCNC: 6.4 G/DL (ref 6–8.5)
SODIUM SERPL-SCNC: 136 MMOL/L (ref 136–145)
TRIGL SERPL-MCNC: 112 MG/DL (ref 0–150)
VLDLC SERPL-MCNC: 20 MG/DL (ref 5–40)

## 2020-12-04 PROCEDURE — 99214 OFFICE O/P EST MOD 30 MIN: CPT | Performed by: FAMILY MEDICINE

## 2020-12-04 PROCEDURE — 80053 COMPREHEN METABOLIC PANEL: CPT | Performed by: FAMILY MEDICINE

## 2020-12-04 PROCEDURE — 83036 HEMOGLOBIN GLYCOSYLATED A1C: CPT | Performed by: FAMILY MEDICINE

## 2020-12-04 PROCEDURE — 82570 ASSAY OF URINE CREATININE: CPT | Performed by: FAMILY MEDICINE

## 2020-12-04 PROCEDURE — 82043 UR ALBUMIN QUANTITATIVE: CPT | Performed by: FAMILY MEDICINE

## 2020-12-04 PROCEDURE — 80061 LIPID PANEL: CPT | Performed by: FAMILY MEDICINE

## 2020-12-04 PROCEDURE — 36415 COLL VENOUS BLD VENIPUNCTURE: CPT | Performed by: FAMILY MEDICINE

## 2020-12-04 RX ORDER — AMLODIPINE BESYLATE 2.5 MG/1
2.5 TABLET ORAL DAILY
Qty: 90 TABLET | Refills: 1 | Status: SHIPPED | OUTPATIENT
Start: 2020-12-04 | End: 2021-05-17

## 2020-12-04 NOTE — PROGRESS NOTES
Marcelle Johnson is a 87 y.o. female.      Assessment/Plan   Problems Addressed this Visit        Cardiovascular and Mediastinum    Mixed hyperlipidemia - Primary    Relevant Orders    Lipid Panel    Essential hypertension    Relevant Medications    amLODIPine (NORVASC) 2.5 MG tablet    Other Relevant Orders    Comprehensive Metabolic Panel       Endocrine    Hypothyroidism    Borderline diabetes    Relevant Orders    Hemoglobin A1c    Microalbumin / Creatinine Urine Ratio - Urine, Clean Catch       Musculoskeletal and Integument    Osteoporosis    Relevant Orders    DEXA Bone Density Axial       Genitourinary    Urge incontinence of urine    Relevant Medications    Mirabegron ER (MYRBETRIQ) 25 MG tablet sustained-release 24 hour 24 hr tablet      Other Visit Diagnoses     Abnormal findings on diagnostic imaging of limbs         Relevant Orders    DEXA Bone Density Axial      Diagnoses       Codes Comments    Mixed hyperlipidemia    -  Primary ICD-10-CM: E78.2  ICD-9-CM: 272.2     Essential hypertension     ICD-10-CM: I10  ICD-9-CM: 401.9     Acquired hypothyroidism     ICD-10-CM: E03.9  ICD-9-CM: 244.9     Borderline diabetes     ICD-10-CM: R73.03  ICD-9-CM: 790.29     Osteoporosis with current pathological fracture with routine healing, unspecified osteoporosis type, subsequent encounter     ICD-10-CM: M80.00XD  ICD-9-CM: V54.29, 733.00     Urge incontinence of urine     ICD-10-CM: N39.41  ICD-9-CM: 788.31     Abnormal findings on diagnostic imaging of limbs      ICD-10-CM: R93.6  ICD-9-CM: 793.99            Hypertension continue losartan add amlodipine 2.5 mg daily monitor blood pressure with a goal less than 150/90  Hyperlipidemia continue statin hypothyroidism continue levothyroxine osteoporosis follow-up results of bone density  Incontinence trial of Myrbetriq    Return in about 1 month (around 1/4/2021), or if symptoms worsen or fail to improve, for Recheck.      Chief Complaint   Patient presents with   •  "follow up to hypertension   • follow up to hypothyroidism   • follow up to hyperlipidemia     Social History     Tobacco Use   • Smoking status: Former Smoker     Years: 5.00     Types: Cigarettes     Quit date:      Years since quittin.9   • Smokeless tobacco: Never Used   Substance Use Topics   • Alcohol use: No   • Drug use: No       History of Present Illness   Patient follows up for ongoing management of chronic medical problems of hypertension hypothyroidism hyperlipidemia recent labs were reviewed thyroid therapeutic she has had some elevated blood pressure readings greater than 150/90 no chest pain shortness of breath or other cardiovascular compromise  No unwanted side effects of statin therapy  Relate to some urinary incontinence and urge feeling there is no leak with cough or sneeze however when she has to go she has to go when she gets up at least 4 times a night,  No dysuria fever sweats or chills  The following portions of the patient's history were reviewed and updated as appropriate:PMHroutine: Social history , Allergies, Current Medications, Active Problem List and Health Maintenance    Review of Systems   Constitutional: Negative.    HENT: Negative.    Respiratory: Negative.    Cardiovascular: Negative.    Gastrointestinal: Negative.    Genitourinary: Positive for frequency and urgency. Negative for difficulty urinating, dysuria and flank pain.   Musculoskeletal: Negative.    Neurological: Negative.    Psychiatric/Behavioral: Negative.        Objective   Vitals:    20 1200   BP: (!) 182/88   BP Location: Right arm   Patient Position: Sitting   Cuff Size: Adult   Pulse: 73   SpO2: 98%   Weight: 75.8 kg (167 lb)   Height: 160 cm (62.99\")     Body mass index is 29.59 kg/m².  Physical Exam  Vitals signs and nursing note reviewed.   Constitutional:       Appearance: Normal appearance.   HENT:      Head: Normocephalic and atraumatic.   Cardiovascular:      Rate and Rhythm: Normal rate and " regular rhythm.      Pulses: Normal pulses.      Heart sounds: Normal heart sounds.   Pulmonary:      Effort: Pulmonary effort is normal.      Breath sounds: Normal breath sounds.   Abdominal:      Tenderness: There is no right CVA tenderness or left CVA tenderness.   Musculoskeletal:      Right lower leg: No edema.      Left lower leg: No edema.   Skin:     General: Skin is warm and dry.   Neurological:      Mental Status: She is alert.   Psychiatric:         Mood and Affect: Mood normal.         Behavior: Behavior normal.         Thought Content: Thought content normal.         Judgment: Judgment normal.       Reviewed Data:  Lab on 11/11/2020   Component Date Value Ref Range Status   • Glucose 11/11/2020 157* 65 - 99 mg/dL Final   • BUN 11/11/2020 7* 8 - 23 mg/dL Final   • Creatinine 11/11/2020 0.74  0.57 - 1.00 mg/dL Final   • Sodium 11/11/2020 135* 136 - 145 mmol/L Final   • Potassium 11/11/2020 4.3  3.5 - 5.2 mmol/L Final   • Chloride 11/11/2020 101  98 - 107 mmol/L Final   • CO2 11/11/2020 29.8* 22.0 - 29.0 mmol/L Final   • Calcium 11/11/2020 8.9  8.6 - 10.5 mg/dL Final   • Total Protein 11/11/2020 6.1  6.0 - 8.5 g/dL Final   • Albumin 11/11/2020 3.50  3.50 - 5.20 g/dL Final   • ALT (SGPT) 11/11/2020 18  1 - 33 U/L Final   • AST (SGOT) 11/11/2020 27  1 - 32 U/L Final   • Alkaline Phosphatase 11/11/2020 64  39 - 117 U/L Final   • Total Bilirubin 11/11/2020 0.2  0.0 - 1.2 mg/dL Final   • eGFR Non African Amer 11/11/2020 74  >60 mL/min/1.73 Final   • Globulin 11/11/2020 2.6  gm/dL Final   • A/G Ratio 11/11/2020 1.3  g/dL Final   • BUN/Creatinine Ratio 11/11/2020 9.5  7.0 - 25.0 Final   • Anion Gap 11/11/2020 4.2* 5.0 - 15.0 mmol/L Final   • WBC 11/11/2020 9.07  3.40 - 10.80 10*3/mm3 Final   • RBC 11/11/2020 4.25  3.77 - 5.28 10*6/mm3 Final   • Hemoglobin 11/11/2020 12.6  12.0 - 15.9 g/dL Final   • Hematocrit 11/11/2020 37.3  34.0 - 46.6 % Final   • MCV 11/11/2020 87.8  79.0 - 97.0 fL Final   • MCH 11/11/2020  29.6  26.6 - 33.0 pg Final   • MCHC 11/11/2020 33.8  31.5 - 35.7 g/dL Final   • RDW 11/11/2020 12.8  12.3 - 15.4 % Final   • RDW-SD 11/11/2020 41.2  37.0 - 54.0 fl Final   • MPV 11/11/2020 10.4  6.0 - 12.0 fL Final   • Platelets 11/11/2020 155  140 - 450 10*3/mm3 Final   • Neutrophil % 11/11/2020 44.5  42.7 - 76.0 % Final   • Lymphocyte % 11/11/2020 46.7* 19.6 - 45.3 % Final   • Monocyte % 11/11/2020 6.0  5.0 - 12.0 % Final   • Eosinophil % 11/11/2020 2.1  0.3 - 6.2 % Final   • Basophil % 11/11/2020 0.6  0.0 - 1.5 % Final   • Immature Grans % 11/11/2020 0.1  0.0 - 0.5 % Final   • Neutrophils, Absolute 11/11/2020 4.04  1.70 - 7.00 10*3/mm3 Final   • Lymphocytes, Absolute 11/11/2020 4.24* 0.70 - 3.10 10*3/mm3 Final   • Monocytes, Absolute 11/11/2020 0.54  0.10 - 0.90 10*3/mm3 Final   • Eosinophils, Absolute 11/11/2020 0.19  0.00 - 0.40 10*3/mm3 Final   • Basophils, Absolute 11/11/2020 0.05  0.00 - 0.20 10*3/mm3 Final   • Immature Grans, Absolute 11/11/2020 0.01  0.00 - 0.05 10*3/mm3 Final   • nRBC 11/11/2020 0.0  0.0 - 0.2 /100 WBC Final

## 2021-01-12 ENCOUNTER — APPOINTMENT (OUTPATIENT)
Dept: MAMMOGRAPHY | Facility: HOSPITAL | Age: 86
End: 2021-01-12

## 2021-01-21 RX ORDER — LEVOTHYROXINE SODIUM 0.1 MG/1
100 TABLET ORAL DAILY
Qty: 90 TABLET | Refills: 1 | Status: SHIPPED | OUTPATIENT
Start: 2021-01-21 | End: 2021-04-22 | Stop reason: SDUPTHER

## 2021-01-21 NOTE — TELEPHONE ENCOUNTER
Caller: Marcelle Johnson    Relationship: Self    Best call back number: [phone]    Medication needed:   Requested Prescriptions     Pending Prescriptions Disp Refills   • levothyroxine (SYNTHROID, LEVOTHROID) 100 MCG tablet 90 tablet 0     Sig: Take 1 tablet by mouth Daily.       When do you need the refill by: 01/21/2021    What details did the patient provide when requesting the medication: PATIENT IS ON HER LAST TABLET FOR levothyroxine (SYNTHROID, LEVOTHROID) 100 MCG tablet. PATIENT STATES THAT HER PHARMACY HAS BEEN TRYING TO REACH OUT TO THE PCP FOR A NEW PRESCRIPTION FOR THE MEDICATION.     Does the patient have less than a 3 day supply:  [x] Yes  [] No    What is the patient's preferred pharmacy: [facility]/PHARMACY #[id number] [place], [place] - [address] AT [facility] [phone] [facility] [phone]

## 2021-02-15 RX ORDER — TAMOXIFEN CITRATE 20 MG/1
TABLET ORAL
Qty: 90 TABLET | Refills: 1 | Status: SHIPPED | OUTPATIENT
Start: 2021-02-15 | End: 2021-06-07

## 2021-02-23 ENCOUNTER — HOSPITAL ENCOUNTER (OUTPATIENT)
Dept: BONE DENSITY | Facility: HOSPITAL | Age: 86
Discharge: HOME | End: 2021-02-23
Admitting: FAMILY MEDICINE

## 2021-02-23 DIAGNOSIS — R93.6 ABNORMAL FINDINGS ON DIAGNOSTIC IMAGING OF LIMBS: ICD-10-CM

## 2021-02-23 DIAGNOSIS — M80.00XD OSTEOPOROSIS WITH CURRENT PATHOLOGICAL FRACTURE WITH ROUTINE HEALING, UNSPECIFIED OSTEOPOROSIS TYPE, SUBSEQUENT ENCOUNTER: ICD-10-CM

## 2021-02-23 PROCEDURE — 77080 DXA BONE DENSITY AXIAL: CPT

## 2021-03-01 RX ORDER — LOSARTAN POTASSIUM 100 MG/1
TABLET ORAL
Qty: 90 TABLET | Refills: 1 | Status: SHIPPED | OUTPATIENT
Start: 2021-03-01 | End: 2021-11-22

## 2021-03-09 DIAGNOSIS — Z23 IMMUNIZATION DUE: ICD-10-CM

## 2021-04-22 ENCOUNTER — TELEPHONE (OUTPATIENT)
Dept: INTERNAL MEDICINE | Facility: CLINIC | Age: 86
End: 2021-04-22

## 2021-04-22 RX ORDER — LEVOTHYROXINE SODIUM 0.1 MG/1
100 TABLET ORAL DAILY
Qty: 90 TABLET | Refills: 1 | Status: SHIPPED | OUTPATIENT
Start: 2021-04-22 | End: 2022-01-13

## 2021-04-22 NOTE — TELEPHONE ENCOUNTER
Caller: Marcelle Johnson    Relationship: Self    Best call back number: [phone]    Medication needed:   Requested Prescriptions     Pending Prescriptions Disp Refills   • levothyroxine (SYNTHROID, LEVOTHROID) 100 MCG tablet 90 tablet 1     Sig: Take 1 tablet by mouth Daily.       When do you need the refill by: ASAP    What additional details did the patient provide when requesting the medication: 5 DAY SUPPLY  Does the patient have less than a 3 day supply:  [] Yes  [x] No    What is the patient's preferred pharmacy: [facility]/PHARMACY #[id number] [place], [place] - [address] [facility] AT [facility] [phone] [facility] [phone]

## 2021-04-28 ENCOUNTER — APPOINTMENT (OUTPATIENT)
Dept: OTHER | Facility: HOSPITAL | Age: 86
End: 2021-04-28

## 2021-05-05 ENCOUNTER — LAB (OUTPATIENT)
Dept: OTHER | Facility: HOSPITAL | Age: 86
End: 2021-05-05

## 2021-05-05 ENCOUNTER — OFFICE VISIT (OUTPATIENT)
Dept: ONCOLOGY | Facility: CLINIC | Age: 86
End: 2021-05-05

## 2021-05-05 VITALS
HEIGHT: 63 IN | OXYGEN SATURATION: 97 % | RESPIRATION RATE: 18 BRPM | BODY MASS INDEX: 29.46 KG/M2 | HEART RATE: 80 BPM | DIASTOLIC BLOOD PRESSURE: 69 MMHG | TEMPERATURE: 97.7 F | WEIGHT: 166.3 LBS | SYSTOLIC BLOOD PRESSURE: 150 MMHG

## 2021-05-05 DIAGNOSIS — M81.0 OSTEOPOROSIS, UNSPECIFIED OSTEOPOROSIS TYPE, UNSPECIFIED PATHOLOGICAL FRACTURE PRESENCE: ICD-10-CM

## 2021-05-05 DIAGNOSIS — C50.411 MALIGNANT NEOPLASM OF UPPER-OUTER QUADRANT OF RIGHT FEMALE BREAST, UNSPECIFIED ESTROGEN RECEPTOR STATUS (HCC): Primary | ICD-10-CM

## 2021-05-05 DIAGNOSIS — Z12.31 SCREENING MAMMOGRAM, ENCOUNTER FOR: ICD-10-CM

## 2021-05-05 LAB
ALBUMIN SERPL-MCNC: 3.6 G/DL (ref 3.5–5.2)
ALBUMIN/GLOB SERPL: 1.4 G/DL
ALP SERPL-CCNC: 56 U/L (ref 39–117)
ALT SERPL W P-5'-P-CCNC: 17 U/L (ref 1–33)
ANION GAP SERPL CALCULATED.3IONS-SCNC: 5.8 MMOL/L (ref 5–15)
AST SERPL-CCNC: 27 U/L (ref 1–32)
BASOPHILS # BLD AUTO: 0.04 10*3/MM3 (ref 0–0.2)
BASOPHILS NFR BLD AUTO: 0.4 % (ref 0–1.5)
BILIRUB SERPL-MCNC: 0.2 MG/DL (ref 0–1.2)
BUN SERPL-MCNC: 9 MG/DL (ref 8–23)
BUN/CREAT SERPL: 11.7 (ref 7–25)
CALCIUM SPEC-SCNC: 8.8 MG/DL (ref 8.6–10.5)
CHLORIDE SERPL-SCNC: 102 MMOL/L (ref 98–107)
CO2 SERPL-SCNC: 28.2 MMOL/L (ref 22–29)
CREAT SERPL-MCNC: 0.77 MG/DL (ref 0.57–1)
DEPRECATED RDW RBC AUTO: 42.1 FL (ref 37–54)
EOSINOPHIL # BLD AUTO: 0.2 10*3/MM3 (ref 0–0.4)
EOSINOPHIL NFR BLD AUTO: 2.2 % (ref 0.3–6.2)
ERYTHROCYTE [DISTWIDTH] IN BLOOD BY AUTOMATED COUNT: 12.8 % (ref 12.3–15.4)
GFR SERPL CREATININE-BSD FRML MDRD: 71 ML/MIN/1.73
GLOBULIN UR ELPH-MCNC: 2.6 GM/DL
GLUCOSE SERPL-MCNC: 192 MG/DL (ref 65–99)
HCT VFR BLD AUTO: 37.1 % (ref 34–46.6)
HGB BLD-MCNC: 12.2 G/DL (ref 12–15.9)
IMM GRANULOCYTES # BLD AUTO: 0.02 10*3/MM3 (ref 0–0.05)
IMM GRANULOCYTES NFR BLD AUTO: 0.2 % (ref 0–0.5)
LYMPHOCYTES # BLD AUTO: 3.67 10*3/MM3 (ref 0.7–3.1)
LYMPHOCYTES NFR BLD AUTO: 41.1 % (ref 19.6–45.3)
MCH RBC QN AUTO: 29.5 PG (ref 26.6–33)
MCHC RBC AUTO-ENTMCNC: 32.9 G/DL (ref 31.5–35.7)
MCV RBC AUTO: 89.8 FL (ref 79–97)
MONOCYTES # BLD AUTO: 0.48 10*3/MM3 (ref 0.1–0.9)
MONOCYTES NFR BLD AUTO: 5.4 % (ref 5–12)
NEUTROPHILS NFR BLD AUTO: 4.51 10*3/MM3 (ref 1.7–7)
NEUTROPHILS NFR BLD AUTO: 50.7 % (ref 42.7–76)
NRBC BLD AUTO-RTO: 0 /100 WBC (ref 0–0.2)
PLATELET # BLD AUTO: 160 10*3/MM3 (ref 140–450)
PMV BLD AUTO: 10.1 FL (ref 6–12)
POTASSIUM SERPL-SCNC: 4.1 MMOL/L (ref 3.5–5.2)
PROT SERPL-MCNC: 6.2 G/DL (ref 6–8.5)
RBC # BLD AUTO: 4.13 10*6/MM3 (ref 3.77–5.28)
SODIUM SERPL-SCNC: 136 MMOL/L (ref 136–145)
WBC # BLD AUTO: 8.92 10*3/MM3 (ref 3.4–10.8)

## 2021-05-05 PROCEDURE — 85025 COMPLETE CBC W/AUTO DIFF WBC: CPT | Performed by: INTERNAL MEDICINE

## 2021-05-05 PROCEDURE — 36415 COLL VENOUS BLD VENIPUNCTURE: CPT

## 2021-05-05 PROCEDURE — 80053 COMPREHEN METABOLIC PANEL: CPT | Performed by: INTERNAL MEDICINE

## 2021-05-05 PROCEDURE — 99213 OFFICE O/P EST LOW 20 MIN: CPT | Performed by: INTERNAL MEDICINE

## 2021-05-05 NOTE — PROGRESS NOTES
Subjective .    REASONS FOR FOLLOWUP: 1.  [id number] invasive ductal carcinoma of the right breast, estrogen receptor positive greater than 90%, progesterone receptor 90%, HER-2/breanne 2+ on immunohistochemistry and negative by fish  2.  Osteoporosis  3. arimidex held secondary to arthralgias.  4. Side effect to prolia, patient complained of jaw pain secondary to Prolia and refused any bisphosphonates  5.  Arimidex discontinued with resolution of arthralgias.  6.  Patient started on tamoxifen as of July 6, 2017.    HISTORY OF PRESENT ILLNESS:  The patient is a 88 y.o. year old female who is here for follow-up with the above-mentioned history.    History of Present Illness   patient is a 85-year-old female with a above history. She continues to take tamoxifen daily as prescribed with good tolerance.      Overall, patient reports feeling well.  She has not lost weight and spends most of her time taking care of her .  She saw Dr. Hopson for a colonoscopy which was normal apart from some hemorrhoids, and will see him again in 5 years.    She is still taking Tamoxifen and is tolerating it well.      Patient's labs from 11/20/19 are normal.    We reviewed with patient the screening mammogram from 11/13/19 which was benign-negative.    Interval history: Patient is tolerating tamoxifen.  She does not have any hot flashes.  She has not developed any DVT.  She denies any active bleeding.    Patient is due for mammogram November 2021.  She really has 6 more months of tamoxifen left to complete 5 years.  She denies any complaints with tamoxifen so far.  She has not lost weight and she has got a good appetite and she is active.    Past Medical History:   Diagnosis Date   • Breast cancer of upper-outer quadrant of right female breast (CMS/HCC) 10/13/2016   • Colon cancer (CMS/HCC)     had colon resection   • Diabetes (CMS/HCC)     borderline   • Diverticulosis    • Esophageal stricture     had it stretched via scope   •  High cholesterol    • History of bacterial pneumonia     SEVERAL YEARS AGO   • Hypertension    • Hypothyroid    • LBBB (left bundle branch block)    • Sleep trouble        ONCOLOGIC HISTORY:    patient is a 83-year-old female who states that she noticed a lump in the upper outer quadrant of the right breast over the last 2 months. She thinks it was the size of a dime. She then underwent a mammogram on 8/3/2016 which showed a 1 cm mass in the middle third upper outer quadrant of the right breast. Additionly  there was a small asymmetry in the left breast. Spot compression showed partial resolution of this area in the left breast. This measures 0.8 cm. No evidence of axillary adenopathy seen.     Ultrasound was done which shows at 10 o'clock position in the right breast there is a 0.9 cm irregular heterogeneous mass. But no abnormality is seen in the left breast. A six-month follow-up on the left breast is suggested.     Patient then underwent ultrasound-guided biopsy on 8/17/2016. Allergies shows invasive mammary carcinoma ductal type with focal lobular features in the right breast, [place] score of 5 out of 9. There is no angiolymphatic invasion seen. Maximum dimension of invasive carcinoma in 7 mm. No carcinoma in situ identified. Estrogen receptors were greater than 90%, progesterone receptor is 90%, HER-2/breanne was 2+ on immunohistochemistry and negative by fish.     MRI of the breast shows middle third upper quadrant of the right breast at 10 o'clock position the biopsy cavity measured 1.1 cm from superior to inferior dimension 1.3 cm in anterior to posterior dimension and 2.1 cm from medial to lateral dimension. There were no other areas of enhancement in the left breast.  Chest x-ray no acute infiltrate.     Patient underwent lumpectomy on the right side with sentinel lymph node biopsy by Dr. Lee on 9/19/2016. Patient had invasive carcinoma 1.2 cm with lobular features. The overall grade is grade 2 with  her [place] score of 6 out of 9. He had focal ductal carcinoma in situ and focal atypical lobular hyperplasia as well. The ductal carcinoma in situ was less than 1 mm, cribriform pattern and low nuclear grade grade 1. The margins were indeterminate for invasive carcinoma but uninvolved by DCIS. Patient has had reexcised margins and invasive carcinoma is present only in the reexcised anterior margin. The tumor focally extends to within 1 mm of the reexcised anterior margin. The fibroinflammatory biopsy skin changes extend to the anterior margin. I will need to discuss with the pathologist about the margins. She has extends to within 3 mm was to margins and to within 5 mm in the inferior margin. The number of sentinel lymph nodes examined is to and there is no evidence of my or macro metastasis and no isolated tumor cells.     Patient now has been referred to radiation oncology and us for further options of treatment.  Given her age, her low stage, her strong estrogen and progesterone receptor positivity Dr. Nolasco felt that she does not need to undergo radiation.    Patient's bone density done shows osteoporosis.  Discussion done about consideration of tamoxifen versus Arimidex with prolia.  Patient refuses tamoxifen and is willing to consider Arimidex.  We did discuss that it'll be important for her to take prolia , calcium and vitamin D.  Pt could not tolerate prolia. Prolia stopped on 06/12/2019    Pt on arimidex  and likely change to tamoxifen. Due to side effects    Arimidex discontinued secondary to severe arthralgias.  Tamoxifen started as of July 6, 2017.      Past medical history: Patient was diagnosed with the colon cancer back in 1989 and at the same time she had her ovaries removed and appendix and gallbladder removed. She didn't require any chemotherapy at the time.  Patient had pneumonia back in 1995.  Her hypertension  High cholesterol  Hypothyroidism  Patient has had colonoscopy 5 years ago by   Ciro and apparently had a polyp she was to return to him for another colonoscopy in 5 years.  Patient had esophageal stricture as a result she underwent dilation by Dr. Hopson last year.  She underwent parathyroidectomy.  She has borderline diabetes. On diet control     OB/GYN history she started metastases. At age 12, had menopause at age 50 when she underwent oophorectomy. She is  2 para 2 no miscarriages she has 2 children one is 54 and second one is 51-year-old and one grandchild.       Current Outpatient Medications on File Prior to Visit   Medication Sig Dispense Refill   • amLODIPine (NORVASC) 2.5 MG tablet Take 1 tablet by mouth Daily. 90 tablet 1   • Calcium Citrate-Vitamin D (CALCIUM + D PO) Take 1,000 mg by mouth daily.     • cholecalciferol (VITAMIN D3) 25 MCG (1000 UT) tablet Take 1,000 Units by mouth Daily.     • Esomeprazole Magnesium (NEXIUM PO) Take 22.3 mg by mouth every morning.     • FIBER PO Take 2 capsules by mouth Daily As Needed.     • levothyroxine (SYNTHROID, LEVOTHROID) 100 MCG tablet Take 1 tablet by mouth Daily. 90 tablet 1   • losartan (COZAAR) 100 MG tablet TAKE 1 TABLET BY MOUTH EVERY DAY 90 tablet 1   • Mirabegron ER (MYRBETRIQ) 25 MG tablet sustained-release 24 hour 24 hr tablet Take 1 tablet by mouth Daily. 30 tablet 1   • pravastatin (PRAVACHOL) 40 MG tablet Take 1 tablet by mouth Every Night. 90 tablet 2   • tamoxifen (NOLVADEX) 20 MG chemo tablet TAKE 1 TABLET BY MOUTH EVERY DAY 90 tablet 1   • vitamin B-6 (PYRIDOXINE) 50 MG tablet Take 50 mg by mouth Daily.     • vitamin C (ASCORBIC ACID) 500 MG tablet Take 500 mg by mouth Daily As Needed.       No current facility-administered medications on file prior to visit.       ALLERGIES:     Allergies   Allergen Reactions   • Morphine And Related Nausea And Vomiting       Social History     Socioeconomic History   • Marital status:      Spouse name: Not on file   • Number of children: 2   • Years of education: High  "school   • Highest education level: Not on file   Tobacco Use   • Smoking status: Former Smoker     Years: 5.00     Types: Cigarettes     Quit date:      Years since quittin.3   • Smokeless tobacco: Never Used   Substance and Sexual Activity   • Alcohol use: No   • Drug use: No   • Sexual activity: Defer         Cancer-related family history is negative for Breast cancer, Colon cancer, Endometrial cancer, and Ovarian cancer.     Review of Systems   Constitutional: Negative for appetite change, chills, diaphoresis, fatigue, fever and unexpected weight change.   HENT: Negative for hearing loss, sore throat and trouble swallowing.         See HPI   Respiratory: Negative for cough, chest tightness, shortness of breath and wheezing.    Cardiovascular: Negative for chest pain, palpitations and leg swelling.   Gastrointestinal: Negative for abdominal distention, abdominal pain, constipation, diarrhea, nausea and vomiting.   Genitourinary: Negative for dysuria, frequency, hematuria and urgency.   Musculoskeletal: Negative for joint swelling.        No muscle weakness.   Skin: Negative for rash and wound.   Neurological: Negative for seizures, syncope, speech difficulty, weakness, numbness and headaches.   Hematological: Negative for adenopathy. Does not bruise/bleed easily.   Psychiatric/Behavioral: Negative for behavioral problems, confusion and suicidal ideas.   All other systems reviewed and are negative.    I have reviewed and confirmed the accuracy of the ROS as documented by the MA/LPN/RN Maria M Galicia MD        Objective      Vitals:    21 1239   BP: 150/69   Pulse: 80   Resp: 18   Temp: 97.7 °F (36.5 °C)   TempSrc: Temporal   SpO2: 97%   Weight: 75.4 kg (166 lb 4.8 oz)   Height: 160 cm (62.99\")   PainSc: 0-No pain     Current Status 2021   ECOG score 0     Physical Exam      This patient's ACP documentation is up to date, and there's nothing further left to document.    CONSTITUTIONAL:  Vital " signs reviewed.    No distress, looks comfortable.  EYES:  Conjunctiva and lids unremarkable.  Extraocular eye movements intact.  HEENT: No lymphadenopathy, no thyromegaly  RESPIRATORY:  Normal respiratory effort.  , no rales  or wheezing, clear   CARDIOVASCULAR:  Regular rate and rhythm, no murmur  BREAST: Right breast: No skin changes, no evidence of breast mass, no nipple discharge, no evidence of any right axillary adenopathy or right supraclavicular adenopathy  Left breast: No evidence of any skin changes, no evidence of any left breast mass and no evidence of left nipple discharge as well as no left axillary adenopathy or left supraclavicular adenopathy.  No significant lower extremity edema.  SKIN: No wounds.  No rashes.  Abdomen: Soft nontender positive bowel sounds no hepatosplenomegaly  MUSCULOSKELETAL/EXTREMITIES: No clubbing or cyanosis.  No apparent unilateral weakness.  NEURO: CN 2-12 appear intact. No focal neurological deficits noted.  PSYCHIATRIC:  Normal judgment and insight.  Normal mood and affect.    RECENT LABS:  Hematology WBC   Date Value Ref Range Status   2021 8.92 3.40 - 10.80 10*3/mm3 Final     RBC   Date Value Ref Range Status   2021 4.13 3.77 - 5.28 10*6/mm3 Final   2016 4.63  Final     Hemoglobin   Date Value Ref Range Status   2021 12.2 12.0 - 15.9 g/dL Final     Hematocrit   Date Value Ref Range Status   2021 37.1 34.0 - 46.6 % Final     Platelets   Date Value Ref Range Status   2021 160 140 - 450 10*3/mm3 Final      IMAGIN19 - Screening Bilateral Mammogram  Impression: Benign    Assessment/Plan    1. T1 cN0 M0 invasive ductal carcinoma of the right breast, with lobular features, ER positive at 90%, FL positive at 90%, HER-2/breanne 2+ by minimal histochemistry and negative by fish with a HER-2/CEP ratio of 1.1.    · She is on tamoxifen daily and tolerating well.  · Mammogram from 19 was negative.  Mammogram due November  2020  · Reviewed mammogram from November 2020 and is negative  · Patient is to complete tamoxifen by October 2021    2. Osteoporosis:  She is on calcium 1200 mg once daily, vitamin D 1000 international units daily as well.  Patient will not be getting Prolia from now on due to issues with her gums.  She refused Fosamax    3. Benign appearing esophageal stenosis: dilated by Dr. Hopson on 03/29/2019.  S/p dilatation by Dr. Hopson    4. Colonoscopy: done on 03/29/2019 was benign done by Dr. Hopson.    5.  Mild hyponatremia    Plan  1. Continue tamoxifen, patient to complete it in October 2021  2. We will schedule mammogram November 22, 2021  3. Follow-up with me first week of December with labs  4. Following that patient could potentially be released from our office and follow-up with primary care    Maria M Galicia MD        Cc:   Dr. James Weinberg MD

## 2021-05-17 DIAGNOSIS — I10 ESSENTIAL HYPERTENSION: ICD-10-CM

## 2021-05-17 RX ORDER — AMLODIPINE BESYLATE 2.5 MG/1
TABLET ORAL
Qty: 90 TABLET | Refills: 1 | Status: SHIPPED | OUTPATIENT
Start: 2021-05-17 | End: 2021-11-22

## 2021-06-01 ENCOUNTER — LAB (OUTPATIENT)
Dept: LAB | Facility: HOSPITAL | Age: 86
End: 2021-06-01

## 2021-06-01 ENCOUNTER — OFFICE VISIT (OUTPATIENT)
Dept: INTERNAL MEDICINE | Facility: CLINIC | Age: 86
End: 2021-06-01

## 2021-06-01 VITALS
HEART RATE: 70 BPM | OXYGEN SATURATION: 99 % | HEIGHT: 63 IN | BODY MASS INDEX: 29.02 KG/M2 | SYSTOLIC BLOOD PRESSURE: 174 MMHG | WEIGHT: 163.8 LBS | DIASTOLIC BLOOD PRESSURE: 76 MMHG

## 2021-06-01 DIAGNOSIS — C50.411 MALIGNANT NEOPLASM OF UPPER-OUTER QUADRANT OF RIGHT FEMALE BREAST, UNSPECIFIED ESTROGEN RECEPTOR STATUS (HCC): ICD-10-CM

## 2021-06-01 DIAGNOSIS — N39.41 URGE INCONTINENCE OF URINE: ICD-10-CM

## 2021-06-01 DIAGNOSIS — E78.2 MIXED HYPERLIPIDEMIA: Primary | ICD-10-CM

## 2021-06-01 DIAGNOSIS — E03.9 ACQUIRED HYPOTHYROIDISM: ICD-10-CM

## 2021-06-01 DIAGNOSIS — R73.03 BORDERLINE DIABETES: ICD-10-CM

## 2021-06-01 DIAGNOSIS — I10 ESSENTIAL HYPERTENSION: ICD-10-CM

## 2021-06-01 LAB
BILIRUB BLD-MCNC: NEGATIVE MG/DL
CLARITY, POC: CLEAR
COLOR UR: YELLOW
GLUCOSE UR STRIP-MCNC: NEGATIVE MG/DL
HBA1C MFR BLD: 6.77 % (ref 4.8–5.6)
KETONES UR QL: NEGATIVE
LEUKOCYTE EST, POC: ABNORMAL
NITRITE UR-MCNC: NEGATIVE MG/ML
PH UR: 7 [PH] (ref 5–8)
PROT UR STRIP-MCNC: ABNORMAL MG/DL
RBC # UR STRIP: ABNORMAL /UL
SP GR UR: 1.01 (ref 1–1.03)
T4 FREE SERPL-MCNC: 1.36 NG/DL (ref 0.93–1.7)
TSH SERPL DL<=0.05 MIU/L-ACNC: 2.42 UIU/ML (ref 0.27–4.2)
UROBILINOGEN UR QL: NORMAL

## 2021-06-01 PROCEDURE — 84443 ASSAY THYROID STIM HORMONE: CPT | Performed by: FAMILY MEDICINE

## 2021-06-01 PROCEDURE — 84439 ASSAY OF FREE THYROXINE: CPT | Performed by: FAMILY MEDICINE

## 2021-06-01 PROCEDURE — 36415 COLL VENOUS BLD VENIPUNCTURE: CPT | Performed by: FAMILY MEDICINE

## 2021-06-01 PROCEDURE — 87086 URINE CULTURE/COLONY COUNT: CPT

## 2021-06-01 PROCEDURE — 83036 HEMOGLOBIN GLYCOSYLATED A1C: CPT | Performed by: FAMILY MEDICINE

## 2021-06-01 PROCEDURE — 87077 CULTURE AEROBIC IDENTIFY: CPT

## 2021-06-01 PROCEDURE — 81003 URINALYSIS AUTO W/O SCOPE: CPT | Performed by: FAMILY MEDICINE

## 2021-06-01 PROCEDURE — 87186 SC STD MICRODIL/AGAR DIL: CPT

## 2021-06-01 PROCEDURE — 99214 OFFICE O/P EST MOD 30 MIN: CPT | Performed by: FAMILY MEDICINE

## 2021-06-01 RX ORDER — SULFAMETHOXAZOLE AND TRIMETHOPRIM 800; 160 MG/1; MG/1
1 TABLET ORAL 2 TIMES DAILY
Qty: 10 TABLET | Refills: 0 | Status: SHIPPED | OUTPATIENT
Start: 2021-06-01 | End: 2021-12-01

## 2021-06-01 NOTE — PROGRESS NOTES
"Chief Complaint  follow  up to hyperlipidemia, follow up to hypertension, follow up to hypothyroidism, burning on urination, and Urinary Frequency    Subjective          Marcelle Johnson presents to [facility]IMARY [facility]  History of Present Illness  Follows up for ongoing management of hyperlipidemia hypertension hypothyroidism and hyperglycemia her blood pressures well controlled she has no chest pain no shortness of breath she has some vague low back pain with urine symptoms of dysuria and frequency no fever sweats or chills  Or symptoms relatable to poor regulation of her thyroid  Objective   Vital Signs:   /76 (BP Location: Right arm, Patient Position: Sitting, Cuff Size: Large Adult)   Pulse 70   Ht 160 cm (62.99\")   Wt 74.3 kg (163 lb 12.8 oz)   SpO2 99%   BMI 29.03 kg/m²     Physical Exam  Vitals and nursing note reviewed.   Constitutional:       Appearance: Normal appearance.   HENT:      Head: Normocephalic and atraumatic.   Eyes:      General: No scleral icterus.  Cardiovascular:      Rate and Rhythm: Normal rate and regular rhythm.      Pulses: Normal pulses.      Heart sounds: Normal heart sounds.   Abdominal:      Tenderness: There is no right CVA tenderness or left CVA tenderness.   Musculoskeletal:      Right lower leg: No edema.      Left lower leg: No edema.   Skin:     General: Skin is dry.   Neurological:      Mental Status: She is alert.   Psychiatric:         Mood and Affect: Mood normal.         Behavior: Behavior normal.         Thought Content: Thought content normal.         Judgment: Judgment normal.        Result Review :     Common labs    Common Labsle 12/4/20 12/4/20 12/4/20 12/4/20 5/5/21 5/5/21 6/1/21    1228 1228 1228 1228 1230 1230    Glucose  125 (A)    192 (A)    BUN  9    9    Creatinine  0.81    0.77    eGFR Non African Am  67    71    Sodium  136    136    Potassium  4.2    4.1    Chloride  101    102    Calcium  9.0    8.8    Albumin  3.90    3.60  "   Total Bilirubin  0.3    0.2    Alkaline Phosphatase  51    56    AST (SGOT)  29    27    ALT (SGPT)  18    17    WBC     8.92     Hemoglobin     12.2     Hematocrit     37.1     Platelets     160     Total Cholesterol   163       Triglycerides   112       HDL Cholesterol   51       LDL Cholesterol    92       Hemoglobin A1C 6.97 (A)      6.77 (A)   Microalbumin, Urine    12.9      (A) Abnormal value                      Assessment and Plan    Diagnoses and all orders for this visit:    1. Mixed hyperlipidemia (Primary)    2. Essential hypertension    3. Acquired hypothyroidism  -     TSH  -     T4, Free    4. Urge incontinence of urine  -     Urine Culture - Urine, Urine, Clean Catch; Future  -     POC Urinalysis Dipstick, Automated    5. Borderline diabetes  -     Hemoglobin A1c  -     Urine Culture - Urine, Urine, Clean Catch; Future  -     POC Urinalysis Dipstick, Automated    Other orders  -     sulfamethoxazole-trimethoprim (BACTRIM DS,SEPTRA DS) 800-160 MG per tablet; Take 1 tablet by mouth 2 (Two) Times a Day.  Dispense: 10 tablet; Refill: 0    UA suspicious for infection  Pretension continue amlodipine and losartan  Upper lipidemia continue pravastatin  Up if no improvement in urine symptoms but otherwise as needed or    Follow Up   Return in about 6 months (around 12/1/2021), or if symptoms worsen or fail to improve, for Recheck.  Patient was given instructions and counseling regarding her condition or for health maintenance advice. Please see specific information pulled into the AVS if appropriate.

## 2021-06-03 LAB — BACTERIA SPEC AEROBE CULT: ABNORMAL

## 2021-06-07 RX ORDER — TAMOXIFEN CITRATE 20 MG/1
TABLET ORAL
Qty: 90 TABLET | Refills: 1 | Status: SHIPPED | OUTPATIENT
Start: 2021-06-07 | End: 2022-06-06

## 2021-11-20 DIAGNOSIS — I10 ESSENTIAL HYPERTENSION: ICD-10-CM

## 2021-11-22 ENCOUNTER — APPOINTMENT (OUTPATIENT)
Dept: MAMMOGRAPHY | Facility: HOSPITAL | Age: 86
End: 2021-11-22

## 2021-11-22 RX ORDER — AMLODIPINE BESYLATE 2.5 MG/1
TABLET ORAL
Qty: 90 TABLET | Refills: 1 | Status: SHIPPED | OUTPATIENT
Start: 2021-11-22 | End: 2022-05-13

## 2021-11-22 RX ORDER — LOSARTAN POTASSIUM 100 MG/1
TABLET ORAL
Qty: 90 TABLET | Refills: 1 | Status: SHIPPED | OUTPATIENT
Start: 2021-11-22 | End: 2022-05-24

## 2021-12-01 ENCOUNTER — OFFICE VISIT (OUTPATIENT)
Dept: INTERNAL MEDICINE | Facility: CLINIC | Age: 86
End: 2021-12-01

## 2021-12-01 ENCOUNTER — OFFICE VISIT (OUTPATIENT)
Dept: ONCOLOGY | Facility: CLINIC | Age: 86
End: 2021-12-01

## 2021-12-01 VITALS
RESPIRATION RATE: 18 BRPM | HEIGHT: 63 IN | TEMPERATURE: 97.7 F | WEIGHT: 155 LBS | SYSTOLIC BLOOD PRESSURE: 144 MMHG | OXYGEN SATURATION: 98 % | HEART RATE: 93 BPM | DIASTOLIC BLOOD PRESSURE: 69 MMHG | BODY MASS INDEX: 27.46 KG/M2

## 2021-12-01 VITALS
WEIGHT: 155.1 LBS | HEIGHT: 63 IN | DIASTOLIC BLOOD PRESSURE: 60 MMHG | BODY MASS INDEX: 27.48 KG/M2 | OXYGEN SATURATION: 99 % | SYSTOLIC BLOOD PRESSURE: 150 MMHG | HEART RATE: 74 BPM

## 2021-12-01 DIAGNOSIS — E78.2 MIXED HYPERLIPIDEMIA: ICD-10-CM

## 2021-12-01 DIAGNOSIS — R73.03 BORDERLINE DIABETES: ICD-10-CM

## 2021-12-01 DIAGNOSIS — E03.9 ACQUIRED HYPOTHYROIDISM: ICD-10-CM

## 2021-12-01 DIAGNOSIS — I10 ESSENTIAL HYPERTENSION: Primary | ICD-10-CM

## 2021-12-01 DIAGNOSIS — L81.9 PIGMENTED SKIN LESION OF UNCERTAIN BEHAVIOR OF NECK: ICD-10-CM

## 2021-12-01 DIAGNOSIS — C50.411 MALIGNANT NEOPLASM OF UPPER-OUTER QUADRANT OF RIGHT FEMALE BREAST, UNSPECIFIED ESTROGEN RECEPTOR STATUS (HCC): Primary | ICD-10-CM

## 2021-12-01 PROBLEM — Z00.00 MEDICARE ANNUAL WELLNESS VISIT, SUBSEQUENT: Status: ACTIVE | Noted: 2021-12-01

## 2021-12-01 PROCEDURE — 99213 OFFICE O/P EST LOW 20 MIN: CPT | Performed by: INTERNAL MEDICINE

## 2021-12-01 PROCEDURE — 99214 OFFICE O/P EST MOD 30 MIN: CPT | Performed by: FAMILY MEDICINE

## 2021-12-01 PROCEDURE — 1160F RVW MEDS BY RX/DR IN RCRD: CPT | Performed by: FAMILY MEDICINE

## 2021-12-01 PROCEDURE — G0439 PPPS, SUBSEQ VISIT: HCPCS | Performed by: FAMILY MEDICINE

## 2021-12-01 PROCEDURE — 1170F FXNL STATUS ASSESSED: CPT | Performed by: FAMILY MEDICINE

## 2021-12-01 PROCEDURE — 1126F AMNT PAIN NOTED NONE PRSNT: CPT | Performed by: FAMILY MEDICINE

## 2021-12-01 NOTE — PROGRESS NOTES
Subjective .    REASONS FOR FOLLOWUP: 1.  [id number] invasive ductal carcinoma of the right breast, estrogen receptor positive greater than 90%, progesterone receptor 90%, HER-2/breanne 2+ on immunohistochemistry and negative by fish  2.  Osteoporosis  3. arimidex held secondary to arthralgias.  4. Side effect to prolia, patient complained of jaw pain secondary to Prolia and refused any bisphosphonates  5.  Arimidex discontinued with resolution of arthralgias.  6.  Patient started on tamoxifen as of July 6, 2017.  · Patient completed tamoxifen October 2021    HISTORY OF PRESENT ILLNESS:  The patient is a 88 y.o. year old female who is here for follow-up with the above-mentioned history.    History of Present Illness   patient is a 88-year-old female with a above history.  Patient was initially started on Arimidex subsequently switched to tamoxifen which she completed October 2021.  She is 5 years out and doing well.  Patient had mammogram November 28, 2020 and that was negative.  Patient is scheduled December 2021 for her next screening mammogram.  She does self breast exam and has been negative.  She denies any complaints.  She would prefer to follow with her primary care physician on [facility] 5 years is complete.    Past Medical History:   Diagnosis Date   • Breast cancer of upper-outer quadrant of right female breast (HCC) 10/13/2016   • Colon cancer (HCC)     had colon resection   • Diabetes (HCC)     borderline   • Diverticulosis    • Esophageal stricture     had it stretched via scope   • High cholesterol    • History of bacterial pneumonia     SEVERAL YEARS AGO   • Hypertension    • Hypothyroid    • LBBB (left bundle branch block)    • Sleep trouble        ONCOLOGIC HISTORY:    patient is a 83-year-old female who states that she noticed a lump in the upper outer quadrant of the right breast over the last 2 months. She thinks it was the size of a dime. She then underwent a mammogram on 8/3/2016 which showed a 1 cm  mass in the middle third upper outer quadrant of the right breast. Additionly  there was a small asymmetry in the left breast. Spot compression showed partial resolution of this area in the left breast. This measures 0.8 cm. No evidence of axillary adenopathy seen.     Ultrasound was done which shows at 10 o'clock position in the right breast there is a 0.9 cm irregular heterogeneous mass. But no abnormality is seen in the left breast. A six-month follow-up on the left breast is suggested.     Patient then underwent ultrasound-guided biopsy on 8/17/2016. Allergies shows invasive mammary carcinoma ductal type with focal lobular features in the right breast, Nadira score of 5 out of 9. There is no angiolymphatic invasion seen. Maximum dimension of invasive carcinoma in 7 mm. No carcinoma in situ identified. Estrogen receptors were greater than 90%, progesterone receptor is 90%, HER-2/breanne was 2+ on immunohistochemistry and negative by fish.     MRI of the breast shows middle third upper quadrant of the right breast at 10 o'clock position the biopsy cavity measured 1.1 cm from superior to inferior dimension 1.3 cm in anterior to posterior dimension and 2.1 cm from medial to lateral dimension. There were no other areas of enhancement in the left breast.  Chest x-ray no acute infiltrate.     Patient underwent lumpectomy on the right side with sentinel lymph node biopsy by Dr. Lee on 9/19/2016. Patient had invasive carcinoma 1.2 cm with lobular features. The overall grade is grade 2 with her Nadira score of 6 out of 9. He had focal ductal carcinoma in situ and focal atypical lobular hyperplasia as well. The ductal carcinoma in situ was less than 1 mm, cribriform pattern and low nuclear grade grade 1. The margins were indeterminate for invasive carcinoma but uninvolved by DCIS. Patient has had reexcised margins and invasive carcinoma is present only in the reexcised anterior margin. The tumor focally extends to  within 1 mm of the reexcised anterior margin. The fibroinflammatory biopsy skin changes extend to the anterior margin. I will need to discuss with the pathologist about the margins. She has extends to within 3 mm was to margins and to within 5 mm in the inferior margin. The number of sentinel lymph nodes examined is to and there is no evidence of my or macro metastasis and no isolated tumor cells.     Patient now has been referred to radiation oncology and us for further options of treatment.  Given her age, her low stage, her strong estrogen and progesterone receptor positivity Dr. Nolasco felt that she does not need to undergo radiation.    Patient's bone density done shows osteoporosis.  Discussion done about consideration of tamoxifen versus Arimidex with prolia.  Patient refuses tamoxifen and is willing to consider Arimidex.  We did discuss that it'll be important for her to take prolia , calcium and vitamin D.  Pt could not tolerate prolia. Prolia stopped on 2019    Pt on arimidex  and likely change to tamoxifen. Due to side effects    Arimidex discontinued secondary to severe arthralgias.  Tamoxifen started as of 2017.      Past medical history: Patient was diagnosed with the colon cancer back in  and at the same time she had her ovaries removed and appendix and gallbladder removed. She didn't require any chemotherapy at the time.  Patient had pneumonia back in .  Her hypertension  High cholesterol  Hypothyroidism  Patient has had colonoscopy 5 years ago by Dr. Tanner and apparently had a polyp she was to return to him for another colonoscopy in 5 years.  Patient had esophageal stricture as a result she underwent dilation by Dr. Hopson last year.  She underwent parathyroidectomy.  She has borderline diabetes. On diet control     OB/GYN history she started metastases. At age 12, had menopause at age 50 when she underwent oophorectomy. She is  2 para 2 no miscarriages she has 2  children one is 54 and second one is 51-year-old and one grandchild.       Current Outpatient Medications on File Prior to Visit   Medication Sig Dispense Refill   • amLODIPine (NORVASC) 2.5 MG tablet TAKE 1 TABLET BY MOUTH EVERY DAY 90 tablet 1   • Calcium Citrate-Vitamin D (CALCIUM + D PO) Take 1,000 mg by mouth daily.     • cholecalciferol (VITAMIN D3) 25 MCG (1000 UT) tablet Take 1,000 Units by mouth Daily.     • Esomeprazole Magnesium (NEXIUM PO) Take 22.3 mg by mouth every morning.     • FIBER PO Take 2 capsules by mouth Daily As Needed.     • levothyroxine (SYNTHROID, LEVOTHROID) 100 MCG tablet Take 1 tablet by mouth Daily. 90 tablet 1   • losartan (COZAAR) 100 MG tablet TAKE 1 TABLET BY MOUTH EVERY DAY 90 tablet 1   • Mirabegron ER (MYRBETRIQ) 25 MG tablet sustained-release 24 hour 24 hr tablet Take 1 tablet by mouth Daily. 30 tablet 1   • pravastatin (PRAVACHOL) 40 MG tablet Take 1 tablet by mouth Every Night. 90 tablet 2   • tamoxifen (NOLVADEX) 20 MG chemo tablet TAKE 1 TABLET BY MOUTH EVERY DAY 90 tablet 1   • vitamin B-6 (PYRIDOXINE) 50 MG tablet Take 50 mg by mouth Daily.     • vitamin C (ASCORBIC ACID) 500 MG tablet Take 500 mg by mouth Daily As Needed.     • [DISCONTINUED] sulfamethoxazole-trimethoprim (BACTRIM DS,SEPTRA DS) 800-160 MG per tablet Take 1 tablet by mouth 2 (Two) Times a Day. 10 tablet 0     No current facility-administered medications on file prior to visit.       ALLERGIES:     Allergies   Allergen Reactions   • Morphine And Related Nausea And Vomiting       Social History     Socioeconomic History   • Marital status:    • Number of children: 2   • Years of education: High school   Tobacco Use   • Smoking status: Former Smoker     Years: 5.00     Types: Cigarettes     Quit date:      Years since quittin.9   • Smokeless tobacco: Never Used   Substance and Sexual Activity   • Alcohol use: No   • Drug use: No   • Sexual activity: Defer         Cancer-related family  "history is negative for Breast cancer, Colon cancer, Endometrial cancer, and Ovarian cancer.     Review of Systems   Constitutional: Negative for appetite change, chills, diaphoresis, fatigue, fever and unexpected weight change.   HENT: Negative for hearing loss, sore throat and trouble swallowing.         See HPI   Respiratory: Negative for cough, chest tightness, shortness of breath and wheezing.    Cardiovascular: Negative for chest pain, palpitations and leg swelling.   Gastrointestinal: Negative for abdominal distention, abdominal pain, constipation, diarrhea, nausea and vomiting.   Genitourinary: Negative for dysuria, frequency, hematuria and urgency.   Musculoskeletal: Negative for joint swelling.        No muscle weakness.   Skin: Negative for rash and wound.   Neurological: Negative for seizures, syncope, speech difficulty, weakness, numbness and headaches.   Hematological: Negative for adenopathy. Does not bruise/bleed easily.   Psychiatric/Behavioral: Negative for behavioral problems, confusion and suicidal ideas.   All other systems reviewed and are negative.    I have reviewed and confirmed the accuracy of the ROS as documented by the MA/LPN/RN Maria M Galicia MD        Objective      Vitals:    12/01/21 1315   BP: 144/69   Pulse: 93   Resp: 18   Temp: 97.7 °F (36.5 °C)   TempSrc: Temporal   SpO2: 98%   Weight: 70.3 kg (155 lb)   Height: 160 cm (62.99\")   PainSc: 0-No pain     Current Status 12/1/2021   ECOG score 0     Physical Exam           This patient's ACP documentation is up to date, and there's nothing further left to document.      CONSTITUTIONAL:  Vital signs reviewed.  No distress, looks comfortable.  EYES:  Conjunctivae and lids unremarkable.  PERRLA  EARS,NOSE,MOUTH,THROAT:  Ears and nose appear unremarkable.  Lips, teeth, gums appear unremarkable.  RESPIRATORY:  Normal respiratory effort.  Lungs clear to auscultation bilaterally.  BREAST: Right breast: No skin changes, no evidence of " breast mass, no nipple discharge, no evidence of any right axillary adenopathy or right supraclavicular adenopathy  Left breast: No evidence of any skin changes, no evidence of any left breast mass and no evidence of left nipple discharge as well as no left axillary adenopathy or left supraclavicular adenopathy.  CARDIOVASCULAR:  Normal S1, S2.  No murmurs rubs or gallops.  No significant lower extremity edema.  GASTROINTESTINAL: Abdomen appears unremarkable.  Nontender.  No hepatomegaly.  No splenomegaly.  LYMPHATIC:  No cervical, supraclavicular, axillary lymphadenopathy.  SKIN:  Warm.  No rashes.  PSYCHIATRIC:  Normal judgment and insight.  Normal mood and affect.  RECENT LABS:  Hematology WBC   Date Value Ref Range Status   05/05/2021 8.92 3.40 - 10.80 10*3/mm3 Final     RBC   Date Value Ref Range Status   05/05/2021 4.13 3.77 - 5.28 10*6/mm3 Final   02/25/2016 4.63  Final     Hemoglobin   Date Value Ref Range Status   05/05/2021 12.2 12.0 - 15.9 g/dL Final     Hematocrit   Date Value Ref Range Status   05/05/2021 37.1 34.0 - 46.6 % Final     Platelets   Date Value Ref Range Status   05/05/2021 160 140 - 450 10*3/mm3 Final        Assessment/Plan    1. T1 cN0 M0 invasive ductal carcinoma of the right breast, with lobular features, ER positive at 90%, SD positive at 90%, HER-2/breanne 2+ by minimal histochemistry and negative by fish with a HER-2/CEP ratio of 1.1.    · She is on tamoxifen daily and tolerating well.  · Mammogram from 11/13/19 was negative.  Mammogram due November 2020  · Reviewed mammogram from November 2020 and is negative  · Patient is to complete tamoxifen by October 2021  ·   · Patient is completed 5 years of treatment we will release her to her primary care physician's office.  She has no complaints    2. Osteoporosis:  She is on calcium 1200 mg once daily, vitamin D 1000 international units daily as well.  Patient will not be getting Prolia from now on due to issues with her gums.  She refused  Fosamax  · Patient had to get all of her teeth removed.    3. Benign appearing esophageal stenosis: dilated by Dr. Hopson on 03/29/2019.  S/p dilatation by Dr. Hopson    4. Colonoscopy: done on 03/29/2019 was benign done by Dr. Hopson.    5.  Mild hyponatremia    Plan  · Patient has completed 5 years of endocrine therapy.  Her tamoxifen completed October 2021  · Mammogram scheduled for December 2021 and she follow-up with her primary care  · We will release her from our office and I have told her that she needs yearly mammograms and yearly exam breast exam by a physician.  She can get it done by her primary care physician.  · We will release her from our office  Maria M Galicia MD        Cc:   Dr. James Weinberg MD

## 2021-12-01 NOTE — PROGRESS NOTES
"Chief Complaint  follow up to hypertension, follow up to hyperlipidemia, and Medicare Wellness-subsequent    Subjective          Marcelle Johnson presents to [facility] PRIMARY [facility]ARE  History of Present Illness  Patient follows up for ongoing management of chronic problems of hypertension hyperlipidemia hyperglycemia hypothyroidism she is in need of laboratory monitoring of renal status her home blood pressure checks are less than 150/90 she has no chest pain or shortness of breath she has no increased fatigue or any concern for pain and nausea on 1 side effects of medication or symptoms relatable to poor regulation of her thyroid.  She does have a skin lesion that she is noticed on the right side of her neck but she is not hollow its been there  Objective   Vital Signs:   /60 (BP Location: Right arm, Patient Position: Sitting, Cuff Size: Adult)   Pulse 74   Ht 160 cm (62.99\")   Wt 70.4 kg (155 lb 1.6 oz)   SpO2 99%   BMI 27.48 kg/m²     Physical Exam  Vitals and nursing note reviewed.   Constitutional:       Appearance: Normal appearance.   HENT:      Head: Normocephalic and atraumatic.      Right Ear: Tympanic membrane, ear canal and external ear normal.      Left Ear: Tympanic membrane, ear canal and external ear normal.   Eyes:      General: No scleral icterus.  Neck:      Comments: No enlarged thyroid felt  Cardiovascular:      Rate and Rhythm: Normal rate and regular rhythm.      Pulses: Normal pulses.      Heart sounds: Normal heart sounds.   Abdominal:      Tenderness: There is no right CVA tenderness or left CVA tenderness.   Musculoskeletal:      Right lower leg: No edema.      Left lower leg: No edema.   Skin:     Findings: Lesion present.      Comments: Neck right-sided inferior to ear hyperpigmented lesion flat irregular border black   Neurological:      Mental Status: She is alert.        Result Review :     Common labs    Common Labsle 5/5/21 5/5/21 6/1/21    1230 1230  "   Glucose  192 (A)    BUN  9    Creatinine  0.77    eGFR Non African Am  71    Sodium  136    Potassium  4.1    Chloride  102    Calcium  8.8    Albumin  3.60    Total Bilirubin  0.2    Alkaline Phosphatase  56    AST (SGOT)  27    ALT (SGPT)  17    WBC 8.92     Hemoglobin 12.2     Hematocrit 37.1     Platelets 160     Hemoglobin A1C   6.77 (A)   (A) Abnormal value                      Assessment and Plan    Diagnoses and all orders for this visit:    1. Essential hypertension (Primary)  -     Comprehensive Metabolic Panel    2. Mixed hyperlipidemia  -     Lipid Panel    3. Acquired hypothyroidism  -     TSH  -     T4, Free    4. Borderline diabetes  -     Hemoglobin A1c    5. Pigmented skin lesion of uncertain behavior of neck  -     Ambulatory Referral to Dermatology    Follow-up results of blood work for further recommendations regarding chronic problems of hypertension hyperlipidemia hypothyroidism hyperglycemia follow-up otherwise as needed or    Follow Up   Return in about 6 months (around 6/1/2022), or if symptoms worsen or fail to improve, for Recheck.  Patient was given instructions and counseling regarding her condition or for health maintenance advice. Please see specific information pulled into the AVS if appropriate.

## 2021-12-01 NOTE — PROGRESS NOTES
The ABCs of the Annual Wellness Visit  Subsequent Medicare Wellness Visit    Chief Complaint   Patient presents with   • follow up to hypertension   • follow up to hyperlipidemia   • Medicare Wellness-subsequent      Subjective    History of Present Illness:  Marcelle Johnson is a 88 y.o. female who presents for a Subsequent Medicare Wellness Visit.    The following portions of the patient's history were reviewed and   updated as appropriate: allergies, current medications, past family history, past medical history, past social history, past surgical history and problem list.     Compared to one year ago, the patient feels her physical   health is the same.    Compared to one year ago, the patient feels her mental   health is the same.    Recent Hospitalizations:  She was not admitted to the hospital during the last year.       Current Medical Providers:  Patient Care Team:  Jorge Luis Weinberg MD as PCP - General (Family Medicine)  Maria M Galicia MD as Consulting Physician (Hematology and Oncology)  James Lee MD as Surgeon (Breast Surgery)  James Lee MD as Referring Physician (Breast Surgery)    Outpatient Medications Prior to Visit   Medication Sig Dispense Refill   • amLODIPine (NORVASC) 2.5 MG tablet TAKE 1 TABLET BY MOUTH EVERY DAY 90 tablet 1   • Calcium Citrate-Vitamin D (CALCIUM + D PO) Take 1,000 mg by mouth daily.     • cholecalciferol (VITAMIN D3) 25 MCG (1000 UT) tablet Take 1,000 Units by mouth Daily.     • Esomeprazole Magnesium (NEXIUM PO) Take 22.3 mg by mouth every morning.     • FIBER PO Take 2 capsules by mouth Daily As Needed.     • levothyroxine (SYNTHROID, LEVOTHROID) 100 MCG tablet Take 1 tablet by mouth Daily. 90 tablet 1   • losartan (COZAAR) 100 MG tablet TAKE 1 TABLET BY MOUTH EVERY DAY 90 tablet 1   • Mirabegron ER (MYRBETRIQ) 25 MG tablet sustained-release 24 hour 24 hr tablet Take 1 tablet by mouth Daily. 30 tablet 1   • pravastatin (PRAVACHOL) 40 MG tablet Take 1  tablet by mouth Every Night. 90 tablet 2   • tamoxifen (NOLVADEX) 20 MG chemo tablet TAKE 1 TABLET BY MOUTH EVERY DAY 90 tablet 1   • vitamin B-6 (PYRIDOXINE) 50 MG tablet Take 50 mg by mouth Daily.     • vitamin C (ASCORBIC ACID) 500 MG tablet Take 500 mg by mouth Daily As Needed.     • sulfamethoxazole-trimethoprim (BACTRIM DS,SEPTRA DS) 800-160 MG per tablet Take 1 tablet by mouth 2 (Two) Times a Day. 10 tablet 0     No facility-administered medications prior to visit.       No opioid medication identified on active medication list. I have reviewed chart for other potential  high risk medication/s and harmful drug interactions in the elderly.          Aspirin is not on active medication list.  Aspirin use is not indicated based on review of current medical condition/s. Risk of harm outweighs potential benefits.  .    Patient Active Problem List   Diagnosis   • Abnormal brain scan   • Abnormal finding on thyroid function test   • Arthritis   • Diabetes mellitus (HCC)   • Dysphagia   • Mixed hyperlipidemia   • Essential hypertension   • Hypothyroidism   • Osteoporosis   • Borderline diabetes   • Breast cancer of upper-outer quadrant of right female breast (HCC)   • Medicare annual wellness visit, initial   • Hyponatremia   • Other osteoporosis without current pathological fracture   • Esophageal stricture   • Adenomatous polyp of colon   • Malignant neoplasm of colon (HCC)   • Screening mammogram, encounter for   • Acute cystitis with hematuria   • Urge incontinence of urine   • Medicare annual wellness visit, subsequent   • Pigmented skin lesion of uncertain behavior of neck     Advance Care Planning   Advance Directive is on file.  ACP discussion was held with the patient during this visit. Patient has an advance directive in EMR which is still valid.           Objective       Vitals:    12/01/21 0832   BP: 150/60   BP Location: Right arm   Patient Position: Sitting   Cuff Size: Adult   Pulse: 74   SpO2: 99%  "  Weight: 70.4 kg (155 lb 1.6 oz)   Height: 160 cm (62.99\")   PainSc: 0-No pain     BMI Readings from Last 1 Encounters:   21 27.48 kg/m²   BMI is above normal parameters. Recommendations include: none    Does the patient have evidence of cognitive impairment? No    Physical Exam            HEALTH RISK ASSESSMENT    Smoking Status:  Social History     Tobacco Use   Smoking Status Former Smoker   • Years: 5.00   • Types: Cigarettes   • Quit date:    • Years since quittin.9   Smokeless Tobacco Never Used     Alcohol Consumption:  Social History     Substance and Sexual Activity   Alcohol Use No     Fall Risk Screen:    STEADI Fall Risk Assessment was completed, and patient is at LOW risk for falls.Assessment completed on:2021    Depression Screening:  PHQ-2/PHQ-9 Depression Screening 2021   Little interest or pleasure in doing things 0   Feeling down, depressed, or hopeless 0   Trouble falling or staying asleep, or sleeping too much 0   Feeling tired or having little energy 0   Poor appetite or overeating 0   Feeling bad about yourself - or that you are a failure or have let yourself or your family down 0   Trouble concentrating on things, such as reading the newspaper or watching television 0   Moving or speaking so slowly that other people could have noticed. Or the opposite - being so fidgety or restless that you have been moving around a lot more than usual 0   Thoughts that you would be better off dead, or of hurting yourself in some way 0   Total Score 0   If you checked off any problems, how difficult have these problems made it for you to do your work, take care of things at home, or get along with other people? Not difficult at all       Health Habits and Functional and Cognitive Screening:  Functional & Cognitive Status 2021   Do you have difficulty preparing food and eating? No   Do you have difficulty bathing yourself, getting dressed or grooming yourself? No   Do you have " difficulty using the toilet? No   Do you have difficulty moving around from place to place? No   Do you have trouble with steps or getting out of a bed or a chair? No   Current Diet Well Balanced Diet   Dental Exam Up to date   Eye Exam Up to date   Exercise (times per week) 0 times per week   Current Exercises Include No Regular Exercise   Current Exercise Activities Include -   Do you need help using the phone?  No   Are you deaf or do you have serious difficulty hearing?  No   Do you need help with transportation? No   Do you need help shopping? No   Do you need help preparing meals?  No   Do you need help with housework?  No   Do you need help with laundry? No   Do you need help taking your medications? No   Do you need help managing money? No   Do you ever drive or ride in a car without wearing a seat belt? No   Have you felt unusual stress, anger or loneliness in the last month? No   Who do you live with? Spouse   If you need help, do you have trouble finding someone available to you? No   Have you been bothered in the last four weeks by sexual problems? No   Do you have difficulty concentrating, remembering or making decisions? No       Age-appropriate Screening Schedule:  Refer to the list below for future screening recommendations based on patient's age, sex and/or medical conditions. Orders for these recommended tests are listed in the plan section. The patient has been provided with a written plan.    Health Maintenance   Topic Date Due   • ZOSTER VACCINE (2 of 3) 02/26/2015   • MAMMOGRAM  11/20/2021   • HEMOGLOBIN A1C  12/01/2021   • LIPID PANEL  12/04/2021   • URINE MICROALBUMIN  12/04/2021   • DIABETIC EYE EXAM  03/03/2022   • DXA SCAN  02/23/2023   • TDAP/TD VACCINES (2 - Td or Tdap) 08/24/2027   • INFLUENZA VACCINE  Completed              Assessment/Plan     CMS Preventative Services Quick Reference  Risk Factors Identified During Encounter  Inactivity/Sedentary  The above risks/problems have been  discussed with the patient.  Follow up actions/plans if indicated are seen below in the Assessment/Plan Section.  Pertinent information has been shared with the patient in the After Visit Summary.    Diagnoses and all orders for this visit:    1. Essential hypertension (Primary)  -     Comprehensive Metabolic Panel    2. Mixed hyperlipidemia  -     Lipid Panel    3. Acquired hypothyroidism  -     TSH  -     T4, Free    4. Borderline diabetes  -     Hemoglobin A1c    5. Pigmented skin lesion of uncertain behavior of neck  -     Ambulatory Referral to Dermatology        Follow Up:   Return in about 6 months (around 6/1/2022), or if symptoms worsen or fail to improve, for Recheck.     An After Visit Summary and PPPS were given to the patient.  Ongoing  management of chronic medical problems.

## 2021-12-02 ENCOUNTER — LAB (OUTPATIENT)
Dept: LAB | Facility: HOSPITAL | Age: 86
End: 2021-12-02

## 2021-12-02 LAB
ALBUMIN SERPL-MCNC: 3.6 G/DL (ref 3.5–5.2)
ALBUMIN/GLOB SERPL: 1.6 G/DL
ALP SERPL-CCNC: 58 U/L (ref 39–117)
ALT SERPL W P-5'-P-CCNC: 12 U/L (ref 1–33)
ANION GAP SERPL CALCULATED.3IONS-SCNC: 10 MMOL/L (ref 5–15)
AST SERPL-CCNC: 19 U/L (ref 1–32)
BILIRUB SERPL-MCNC: 0.3 MG/DL (ref 0–1.2)
BUN SERPL-MCNC: 8 MG/DL (ref 8–23)
BUN/CREAT SERPL: 8.8 (ref 7–25)
CALCIUM SPEC-SCNC: 9.1 MG/DL (ref 8.6–10.5)
CHLORIDE SERPL-SCNC: 100 MMOL/L (ref 98–107)
CHOLEST SERPL-MCNC: 160 MG/DL (ref 0–200)
CO2 SERPL-SCNC: 26 MMOL/L (ref 22–29)
CREAT SERPL-MCNC: 0.91 MG/DL (ref 0.57–1)
GFR SERPL CREATININE-BSD FRML MDRD: 58 ML/MIN/1.73
GLOBULIN UR ELPH-MCNC: 2.3 GM/DL
GLUCOSE SERPL-MCNC: 126 MG/DL (ref 65–99)
HBA1C MFR BLD: 6.32 % (ref 4.8–5.6)
HDLC SERPL-MCNC: 50 MG/DL (ref 40–60)
LDLC SERPL CALC-MCNC: 93 MG/DL (ref 0–100)
LDLC/HDLC SERPL: 1.84 {RATIO}
POTASSIUM SERPL-SCNC: 4.3 MMOL/L (ref 3.5–5.2)
PROT SERPL-MCNC: 5.9 G/DL (ref 6–8.5)
SODIUM SERPL-SCNC: 136 MMOL/L (ref 136–145)
T4 FREE SERPL-MCNC: 1.33 NG/DL (ref 0.93–1.7)
TRIGL SERPL-MCNC: 91 MG/DL (ref 0–150)
TSH SERPL DL<=0.05 MIU/L-ACNC: 2.1 UIU/ML (ref 0.27–4.2)
VLDLC SERPL-MCNC: 17 MG/DL (ref 5–40)

## 2021-12-02 PROCEDURE — 83036 HEMOGLOBIN GLYCOSYLATED A1C: CPT | Performed by: FAMILY MEDICINE

## 2021-12-02 PROCEDURE — 84443 ASSAY THYROID STIM HORMONE: CPT | Performed by: FAMILY MEDICINE

## 2021-12-02 PROCEDURE — 36415 COLL VENOUS BLD VENIPUNCTURE: CPT | Performed by: FAMILY MEDICINE

## 2021-12-02 PROCEDURE — 80053 COMPREHEN METABOLIC PANEL: CPT | Performed by: FAMILY MEDICINE

## 2021-12-02 PROCEDURE — 84439 ASSAY OF FREE THYROXINE: CPT | Performed by: FAMILY MEDICINE

## 2021-12-02 PROCEDURE — 80061 LIPID PANEL: CPT | Performed by: FAMILY MEDICINE

## 2021-12-03 ENCOUNTER — TELEPHONE (OUTPATIENT)
Dept: INTERNAL MEDICINE | Facility: CLINIC | Age: 86
End: 2021-12-03

## 2021-12-15 ENCOUNTER — OFFICE VISIT (OUTPATIENT)
Dept: INTERNAL MEDICINE | Facility: CLINIC | Age: 86
End: 2021-12-15

## 2021-12-15 VITALS
DIASTOLIC BLOOD PRESSURE: 62 MMHG | OXYGEN SATURATION: 100 % | SYSTOLIC BLOOD PRESSURE: 128 MMHG | BODY MASS INDEX: 27.29 KG/M2 | WEIGHT: 154 LBS | HEIGHT: 63 IN | HEART RATE: 97 BPM | TEMPERATURE: 98.6 F

## 2021-12-15 DIAGNOSIS — L23.9 ALLERGIC DERMATITIS: ICD-10-CM

## 2021-12-15 DIAGNOSIS — B37.2 CUTANEOUS CANDIDIASIS: Primary | ICD-10-CM

## 2021-12-15 DIAGNOSIS — L73.9 FOLLICULITIS: ICD-10-CM

## 2021-12-15 PROCEDURE — 99214 OFFICE O/P EST MOD 30 MIN: CPT | Performed by: FAMILY MEDICINE

## 2021-12-15 RX ORDER — FLUCONAZOLE 150 MG/1
150 TABLET ORAL DAILY
Qty: 10 TABLET | Refills: 1 | Status: SHIPPED | OUTPATIENT
Start: 2021-12-15 | End: 2022-03-23 | Stop reason: SDUPTHER

## 2021-12-15 RX ORDER — PREDNISONE 50 MG/1
50 TABLET ORAL DAILY
Qty: 5 TABLET | Refills: 0 | Status: SHIPPED | OUTPATIENT
Start: 2021-12-15 | End: 2021-12-20

## 2021-12-15 RX ORDER — SULFAMETHOXAZOLE AND TRIMETHOPRIM 800; 160 MG/1; MG/1
1 TABLET ORAL 2 TIMES DAILY
Qty: 10 TABLET | Refills: 0 | Status: SHIPPED | OUTPATIENT
Start: 2021-12-15 | End: 2022-06-06

## 2021-12-15 RX ORDER — TRIAMCINOLONE ACETONIDE 5 MG/G
1 OINTMENT TOPICAL 2 TIMES DAILY
Qty: 15 G | Refills: 2 | Status: SHIPPED | OUTPATIENT
Start: 2021-12-15 | End: 2023-04-04

## 2021-12-15 NOTE — PROGRESS NOTES
12/15/2021    Patient Information  Marcelle Johnson                                                                                          [address]   [facility] [address]      Chief Complaint:   Chief Complaint   Patient presents with   • Rash     CHEST / NECK / GROIN           History of Present Illness:  Pt is here for 2wk hx of rash. rash started on inner thighs and now spreading to neck/torso, limbs. Rash is pruritic and painful only in certain areas now. She was given topical ketoconazole, mupirocin, and hydrocortisone. She has only used topical steroid for 3 days, pt reports after 1 wk of tx her rash is worsening and now the cutaneous candidiasis on her inner thighs is also painful. She denies any fevers. Pt is uncertain if any topical agents have changed at home.   No further concerns/complaints.    Review of Systems   Constitutional: Negative.   HENT: Negative.    Eyes: Negative.    Cardiovascular: Negative.    Respiratory: Negative.    Endocrine: Negative.    Hematologic/Lymphatic: Negative.    Skin: Positive for rash.   Musculoskeletal: Negative.    Gastrointestinal: Negative.    Genitourinary: Negative.    Neurological: Negative.    Psychiatric/Behavioral: Negative.    Allergic/Immunologic: Negative.        Active Problems:    Patient Active Problem List   Diagnosis   • Abnormal brain scan   • Abnormal finding on thyroid function test   • Arthritis   • Diabetes mellitus (HCC)   • Dysphagia   • Mixed hyperlipidemia   • Essential hypertension   • Hypothyroidism   • Osteoporosis   • Borderline diabetes   • Breast cancer of upper-outer quadrant of right female breast (HCC)   • Medicare annual wellness visit, initial   • Hyponatremia   • Other osteoporosis without current pathological fracture   • Esophageal stricture   • Adenomatous polyp of colon   • Malignant neoplasm of colon (HCC)   • Screening mammogram, encounter for   • Acute cystitis with hematuria   • Urge  incontinence of urine   • Medicare annual wellness visit, subsequent   • Pigmented skin lesion of uncertain behavior of neck         Past Medical History:   Diagnosis Date   • Breast cancer of upper-outer quadrant of right female breast (HCC) 10/13/2016   • Colon cancer (HCC)     had colon resection   • Diabetes (HCC)     borderline   • Diverticulosis    • Esophageal stricture     had it stretched via scope   • High cholesterol    • History of bacterial pneumonia     SEVERAL YEARS AGO   • Hypertension    • Hypothyroid    • LBBB (left bundle branch block)    • Sleep trouble          Past Surgical History:   Procedure Laterality Date   • APPENDECTOMY     • BREAST BIOPSY Right    • BREAST LUMPECTOMY     • BREAST LUMPECTOMY WITH SENTINEL NODE BIOPSY Right 9/19/2016    Procedure: RIGHT BREAST LUMPECTOMY WITH SENTINEL NODE BIOPSY;  Surgeon: James Lee MD;  Location: [facility] OR [facility];  Service:    • CHOLECYSTECTOMY     • COLON RESECTION  1990    colon cancer early stage   • COLONOSCOPY  10/05/2011    tics, tubular adenoma polyp    • COLONOSCOPY N/A 3/29/2019    NTEH, Diverticulosis, IH.    • ENDOSCOPY  06/25/2015    Z line irreg, HH, gastritis, bilious gastric fluid, duodenitis.     • ENDOSCOPY N/A 3/29/2019    LA Grade B reflux esophagitis, HH, Gastritis    • HYSTERECTOMY      partial   • OOPHORECTOMY     • PARATHYROID GLAND SURGERY      partial         Allergies   Allergen Reactions   • Morphine And Related Nausea And Vomiting           Current Outpatient Medications:   •  amLODIPine (NORVASC) 2.5 MG tablet, TAKE 1 TABLET BY MOUTH EVERY DAY, Disp: 90 tablet, Rfl: 1  •  Calcium Citrate-Vitamin D (CALCIUM + D PO), Take 1,000 mg by mouth daily., Disp: , Rfl:   •  cholecalciferol (VITAMIN D3) 25 MCG (1000 UT) tablet, Take 1,000 Units by mouth Daily., Disp: , Rfl:   •  Esomeprazole Magnesium (NEXIUM PO), Take 22.3 mg by mouth every morning., Disp: , Rfl:   •  FIBER PO, Take 2 capsules by mouth Daily As Needed., Disp: ,  Rfl:   •  levothyroxine (SYNTHROID, LEVOTHROID) 100 MCG tablet, Take 1 tablet by mouth Daily., Disp: 90 tablet, Rfl: 1  •  losartan (COZAAR) 100 MG tablet, TAKE 1 TABLET BY MOUTH EVERY DAY, Disp: 90 tablet, Rfl: 1  •  Mirabegron ER (MYRBETRIQ) 25 MG tablet sustained-release 24 hour 24 hr tablet, Take 1 tablet by mouth Daily., Disp: 30 tablet, Rfl: 1  •  pravastatin (PRAVACHOL) 40 MG tablet, Take 1 tablet by mouth Every Night., Disp: 90 tablet, Rfl: 2  •  tamoxifen (NOLVADEX) 20 MG chemo tablet, TAKE 1 TABLET BY MOUTH EVERY DAY, Disp: 90 tablet, Rfl: 1  •  vitamin B-6 (PYRIDOXINE) 50 MG tablet, Take 50 mg by mouth Daily., Disp: , Rfl:   •  vitamin C (ASCORBIC ACID) 500 MG tablet, Take 500 mg by mouth Daily As Needed., Disp: , Rfl:   •  fluconazole (Diflucan) 150 MG tablet, Take 1 tablet by mouth Daily., Disp: 10 tablet, Rfl: 1  •  predniSONE (DELTASONE) 50 MG tablet, Take 1 tablet by mouth Daily for 5 days., Disp: 5 tablet, Rfl: 0  •  sulfamethoxazole-trimethoprim (Bactrim DS) 800-160 MG per tablet, Take 1 tablet by mouth 2 (Two) Times a Day., Disp: 10 tablet, Rfl: 0  •  triamcinolone (KENALOG) 0.5 % ointment, Apply 1 application topically to the appropriate area as directed 2 (Two) Times a Day., Disp: 15 g, Rfl: 2      Family History   Problem Relation Age of Onset   • Diabetes Mother    • No Known Problems Father    • No Known Problems Sister    • No Known Problems Brother    • No Known Problems Daughter    • No Known Problems Son    • No Known Problems Maternal Grandmother    • No Known Problems Paternal Grandmother    • No Known Problems Maternal Aunt    • No Known Problems Paternal Aunt    • BRCA 1/2 Neg Hx    • Breast cancer Neg Hx    • Colon cancer Neg Hx    • Endometrial cancer Neg Hx    • Ovarian cancer Neg Hx    • Malig Hyperthermia Neg Hx          Social History     Socioeconomic History   • Marital status:    • Number of children: 2   • Years of education: High school   Tobacco Use   • Smoking  "status: Former Smoker     Years: 5.00     Types: Cigarettes     Quit date:      Years since quittin.9   • Smokeless tobacco: Never Used   Vaping Use   • Vaping Use: Never used   Substance and Sexual Activity   • Alcohol use: No   • Drug use: No   • Sexual activity: Defer         Physical Exam  Constitutional:       Appearance: Normal appearance.   Cardiovascular:      Rate and Rhythm: Normal rate and regular rhythm.      Pulses: Normal pulses.      Heart sounds: Normal heart sounds.   Pulmonary:      Effort: Pulmonary effort is normal.      Breath sounds: Normal breath sounds.   Skin:     General: Skin is warm.      Findings: Rash present.      Comments: +intertrignious changes on inner thigh folds with some superimposed cellulitis, no fluctuance, non-vesicular, + maculopapular rash diffuse on torso, limbs, neck.   Neurological:      General: No focal deficit present.      Mental Status: She is alert and oriented to person, place, and time. Mental status is at baseline.   Psychiatric:         Mood and Affect: Mood normal.         Behavior: Behavior normal.         Thought Content: Thought content normal.         Judgment: Judgment normal.          Vitals:    12/15/21 1118   BP: 128/62   Pulse: 97   Temp: 98.6 °F (37 °C)   SpO2: 100%   Weight: 69.9 kg (154 lb)   Height: 160 cm (62.99\")       Body mass index is 27.29 kg/m².       Lab/other results:        Assessment/Plan:    Diagnoses and all orders for this visit:    1. Cutaneous candidiasis (Primary)    2. Folliculitis    3. Allergic dermatitis    Other orders  -     fluconazole (Diflucan) 150 MG tablet; Take 1 tablet by mouth Daily.  Dispense: 10 tablet; Refill: 1  -     predniSONE (DELTASONE) 50 MG tablet; Take 1 tablet by mouth Daily for 5 days.  Dispense: 5 tablet; Refill: 0  -     sulfamethoxazole-trimethoprim (Bactrim DS) 800-160 MG per tablet; Take 1 tablet by mouth 2 (Two) Times a Day.  Dispense: 10 tablet; Refill: 0  -     triamcinolone (KENALOG) " 0.5 % ointment; Apply 1 application topically to the appropriate area as directed 2 (Two) Times a Day.  Dispense: 15 g; Refill: 2       rash--pt appears to have a mixed picture--will tx for all 3 with topical and oral steroids/anti-fungals + bactrim po. And mupirocin into nares indefinitely. F/u in 10 days.          Procedures

## 2021-12-29 ENCOUNTER — HOSPITAL ENCOUNTER (OUTPATIENT)
Dept: MAMMOGRAPHY | Facility: HOSPITAL | Age: 86
End: 2021-12-29

## 2022-01-06 RX ORDER — TAMOXIFEN CITRATE 20 MG/1
TABLET ORAL
Refills: 1 | OUTPATIENT
Start: 2022-01-06

## 2022-01-12 ENCOUNTER — HOSPITAL ENCOUNTER (OUTPATIENT)
Dept: MAMMOGRAPHY | Facility: HOSPITAL | Age: 87
Discharge: HOME | End: 2022-01-12

## 2022-01-12 DIAGNOSIS — C50.411 MALIGNANT NEOPLASM OF UPPER-OUTER QUADRANT OF RIGHT FEMALE BREAST, UNSPECIFIED ESTROGEN RECEPTOR STATUS: ICD-10-CM

## 2022-01-12 DIAGNOSIS — Z12.31 SCREENING MAMMOGRAM, ENCOUNTER FOR: ICD-10-CM

## 2022-01-13 RX ORDER — LEVOTHYROXINE SODIUM 0.1 MG/1
TABLET ORAL
Qty: 90 TABLET | Refills: 1 | Status: SHIPPED | OUTPATIENT
Start: 2022-01-13 | End: 2022-06-06 | Stop reason: SDUPTHER

## 2022-03-23 ENCOUNTER — TELEPHONE (OUTPATIENT)
Dept: INTERNAL MEDICINE | Facility: CLINIC | Age: 87
End: 2022-03-23

## 2022-03-23 RX ORDER — FLUCONAZOLE 150 MG/1
150 TABLET ORAL WEEKLY
Qty: 2 TABLET | Refills: 1 | Status: SHIPPED | OUTPATIENT
Start: 2022-03-23

## 2022-03-23 NOTE — TELEPHONE ENCOUNTER
Caller: Marcelle Johnson    Relationship to patient: Self    Best call back number: [phone]    Patient is needing: PATIENT THINKS SHE HAS A YEAST INFECTION.  SHE HAS BURNING AND ITCHING.  CAN SHE GET A PRESCRIPTION FOR A YEAST INFECTION TODAY

## 2022-04-12 ENCOUNTER — TELEPHONE (OUTPATIENT)
Dept: INTERNAL MEDICINE | Facility: CLINIC | Age: 87
End: 2022-04-12

## 2022-04-12 RX ORDER — BENZONATATE 100 MG/1
100 CAPSULE ORAL 3 TIMES DAILY PRN
Qty: 30 CAPSULE | Refills: 0 | Status: SHIPPED | OUTPATIENT
Start: 2022-04-12 | End: 2022-06-06

## 2022-05-13 DIAGNOSIS — I10 ESSENTIAL HYPERTENSION: ICD-10-CM

## 2022-05-13 RX ORDER — AMLODIPINE BESYLATE 2.5 MG/1
TABLET ORAL
Qty: 90 TABLET | Refills: 0 | Status: SHIPPED | OUTPATIENT
Start: 2022-05-13 | End: 2022-06-06 | Stop reason: SDUPTHER

## 2022-05-20 ENCOUNTER — TELEPHONE (OUTPATIENT)
Dept: INTERNAL MEDICINE | Facility: CLINIC | Age: 87
End: 2022-05-20

## 2022-05-20 DIAGNOSIS — E03.9 ACQUIRED HYPOTHYROIDISM: Primary | ICD-10-CM

## 2022-05-20 DIAGNOSIS — R73.03 BORDERLINE DIABETES: ICD-10-CM

## 2022-05-20 DIAGNOSIS — E78.2 MIXED HYPERLIPIDEMIA: ICD-10-CM

## 2022-05-20 NOTE — TELEPHONE ENCOUNTER
Caller: Marcelle Johnson    Relationship: Self    Best call back number:[phone]     What orders are you requesting (i.e. lab or imaging): LABS ONLY    In what timeframe would the patient need to come in: A WEEK PRIOR TO OFFICE VISIT ON 06/06/22    Where will you receive your lab/imaging services:     Additional notes: PLEASE ADVISE

## 2022-05-24 RX ORDER — LOSARTAN POTASSIUM 100 MG/1
TABLET ORAL
Qty: 90 TABLET | Refills: 1 | Status: SHIPPED | OUTPATIENT
Start: 2022-05-24 | End: 2022-06-06 | Stop reason: SDUPTHER

## 2022-05-27 ENCOUNTER — LAB (OUTPATIENT)
Dept: LAB | Facility: HOSPITAL | Age: 87
End: 2022-05-27

## 2022-05-27 DIAGNOSIS — E78.2 MIXED HYPERLIPIDEMIA: ICD-10-CM

## 2022-05-27 DIAGNOSIS — E03.9 ACQUIRED HYPOTHYROIDISM: ICD-10-CM

## 2022-05-27 DIAGNOSIS — R73.03 BORDERLINE DIABETES: ICD-10-CM

## 2022-05-27 LAB
ALBUMIN SERPL-MCNC: 3.7 G/DL (ref 3.5–5.2)
ALBUMIN UR-MCNC: 15.6 MG/DL
ALBUMIN/GLOB SERPL: 1.6 G/DL
ALP SERPL-CCNC: 82 U/L (ref 39–117)
ALT SERPL W P-5'-P-CCNC: 11 U/L (ref 1–33)
ANION GAP SERPL CALCULATED.3IONS-SCNC: 12 MMOL/L (ref 5–15)
AST SERPL-CCNC: 15 U/L (ref 1–32)
BILIRUB SERPL-MCNC: 0.2 MG/DL (ref 0–1.2)
BUN SERPL-MCNC: 8 MG/DL (ref 8–23)
BUN/CREAT SERPL: 10.7 (ref 7–25)
CALCIUM SPEC-SCNC: 9.1 MG/DL (ref 8.6–10.5)
CHLORIDE SERPL-SCNC: 104 MMOL/L (ref 98–107)
CHOLEST SERPL-MCNC: 178 MG/DL (ref 0–200)
CO2 SERPL-SCNC: 23 MMOL/L (ref 22–29)
CREAT SERPL-MCNC: 0.75 MG/DL (ref 0.57–1)
CREAT UR-MCNC: 120.9 MG/DL
EGFRCR SERPLBLD CKD-EPI 2021: 76.2 ML/MIN/1.73
GLOBULIN UR ELPH-MCNC: 2.3 GM/DL
GLUCOSE SERPL-MCNC: 129 MG/DL (ref 65–99)
HBA1C MFR BLD: 6.9 % (ref 4.8–5.6)
HDLC SERPL-MCNC: 54 MG/DL (ref 40–60)
LDLC SERPL CALC-MCNC: 107 MG/DL (ref 0–100)
LDLC/HDLC SERPL: 1.96 {RATIO}
MICROALBUMIN/CREAT UR: 129 MG/G
POTASSIUM SERPL-SCNC: 4.2 MMOL/L (ref 3.5–5.2)
PROT SERPL-MCNC: 6 G/DL (ref 6–8.5)
SODIUM SERPL-SCNC: 139 MMOL/L (ref 136–145)
T4 FREE SERPL-MCNC: 1.31 NG/DL (ref 0.93–1.7)
TRIGL SERPL-MCNC: 92 MG/DL (ref 0–150)
TSH SERPL DL<=0.05 MIU/L-ACNC: 1.79 UIU/ML (ref 0.27–4.2)
VLDLC SERPL-MCNC: 17 MG/DL (ref 5–40)

## 2022-05-27 PROCEDURE — 83036 HEMOGLOBIN GLYCOSYLATED A1C: CPT

## 2022-05-27 PROCEDURE — 84443 ASSAY THYROID STIM HORMONE: CPT

## 2022-05-27 PROCEDURE — 82570 ASSAY OF URINE CREATININE: CPT

## 2022-05-27 PROCEDURE — 82043 UR ALBUMIN QUANTITATIVE: CPT

## 2022-05-27 PROCEDURE — 36415 COLL VENOUS BLD VENIPUNCTURE: CPT

## 2022-05-27 PROCEDURE — 84439 ASSAY OF FREE THYROXINE: CPT

## 2022-05-27 PROCEDURE — 80061 LIPID PANEL: CPT

## 2022-05-27 PROCEDURE — 80053 COMPREHEN METABOLIC PANEL: CPT

## 2022-06-06 ENCOUNTER — OFFICE VISIT (OUTPATIENT)
Dept: INTERNAL MEDICINE | Facility: CLINIC | Age: 87
End: 2022-06-06

## 2022-06-06 VITALS
OXYGEN SATURATION: 98 % | BODY MASS INDEX: 28 KG/M2 | HEART RATE: 69 BPM | DIASTOLIC BLOOD PRESSURE: 64 MMHG | HEIGHT: 63 IN | WEIGHT: 158 LBS | SYSTOLIC BLOOD PRESSURE: 144 MMHG

## 2022-06-06 DIAGNOSIS — E03.9 ACQUIRED HYPOTHYROIDISM: ICD-10-CM

## 2022-06-06 DIAGNOSIS — E78.2 MIXED HYPERLIPIDEMIA: Primary | ICD-10-CM

## 2022-06-06 DIAGNOSIS — I10 ESSENTIAL HYPERTENSION: ICD-10-CM

## 2022-06-06 DIAGNOSIS — N76.1 CHRONIC VAGINITIS: ICD-10-CM

## 2022-06-06 DIAGNOSIS — E11.65 TYPE 2 DIABETES MELLITUS WITH HYPERGLYCEMIA, WITHOUT LONG-TERM CURRENT USE OF INSULIN: ICD-10-CM

## 2022-06-06 PROCEDURE — 99214 OFFICE O/P EST MOD 30 MIN: CPT | Performed by: FAMILY MEDICINE

## 2022-06-06 RX ORDER — METFORMIN HYDROCHLORIDE 500 MG/1
500 TABLET, EXTENDED RELEASE ORAL
Qty: 30 TABLET | Refills: 3 | Status: SHIPPED | OUTPATIENT
Start: 2022-06-06 | End: 2022-09-01

## 2022-06-06 RX ORDER — LEVOTHYROXINE SODIUM 0.1 MG/1
100 TABLET ORAL DAILY
Qty: 90 TABLET | Refills: 2 | Status: SHIPPED | OUTPATIENT
Start: 2022-06-06 | End: 2022-07-11 | Stop reason: SDUPTHER

## 2022-06-06 RX ORDER — ESOMEPRAZOLE MAGNESIUM 40 MG/1
CAPSULE, DELAYED RELEASE ORAL
COMMUNITY
Start: 2022-04-28

## 2022-06-06 RX ORDER — LOSARTAN POTASSIUM 100 MG/1
100 TABLET ORAL DAILY
Qty: 90 TABLET | Refills: 1 | Status: SHIPPED | OUTPATIENT
Start: 2022-06-06 | End: 2022-09-19 | Stop reason: SDUPTHER

## 2022-06-06 RX ORDER — AMLODIPINE BESYLATE 2.5 MG/1
2.5 TABLET ORAL DAILY
Qty: 90 TABLET | Refills: 3 | Status: SHIPPED | OUTPATIENT
Start: 2022-06-06

## 2022-06-06 RX ORDER — SUCRALFATE 1 G/1
TABLET ORAL
COMMUNITY
Start: 2022-04-29

## 2022-06-06 NOTE — PROGRESS NOTES
"Chief Complaint  follow up to hypertension and follow up to hyperlipidemia    Subjective        Marcelle Johnson presents to [facility]  History of Present Illness  Follow-up appointment for ongoing management of chronic medical problems of hypertension hyperlipidemia hypothyroidism hyperglycemia her elevated A1c is up to 6.9 she is has recurrent vaginal yeast infections she is using home remedy without much benefit  She has no chest pain shortness of breath or increased fatigue does not check her blood pressure at home  Objective   Vital Signs:  /64 (BP Location: Right arm, Patient Position: Sitting, Cuff Size: Adult)   Pulse 69   Ht 160 cm (62.99\")   Wt 71.7 kg (158 lb)   SpO2 98%   BMI 28.00 kg/m²           Physical Exam  Vitals and nursing note reviewed.   Constitutional:       Appearance: Normal appearance.   HENT:      Head: Normocephalic and atraumatic.   Cardiovascular:      Rate and Rhythm: Normal rate and regular rhythm.      Pulses: Normal pulses.      Heart sounds: Normal heart sounds.   Pulmonary:      Effort: Pulmonary effort is normal.      Breath sounds: Normal breath sounds.   Musculoskeletal:      Right lower leg: No edema.      Left lower leg: No edema.   Skin:     General: Skin is warm and dry.   Neurological:      Mental Status: She is alert.   Psychiatric:         Mood and Affect: Mood normal.         Behavior: Behavior normal.         Thought Content: Thought content normal.         Judgment: Judgment normal.        Result Review :    Common labs    Common Labsle 12/2/21 12/2/21 12/2/21 5/27/22 5/27/22 5/27/22 5/27/22    0828 0828 0828 0829 0829 0829 0829   Glucose  126 (A)   129 (A)     BUN  8   8     Creatinine  0.91   0.75     eGFR Non African Am  58 (A)        Sodium  136   139     Potassium  4.3   4.2     Chloride  100   104     Calcium  9.1   9.1     Albumin  3.60   3.70     Total Bilirubin  0.3   0.2     Alkaline Phosphatase  58   82     AST " (SGOT)  19   15     ALT (SGPT)  12   11     Total Cholesterol 160     178    Triglycerides 91     92    HDL Cholesterol 50     54    LDL Cholesterol  93     107 (A)    Hemoglobin A1C   6.32 (A) 6.90 (A)      Microalbumin, Urine       15.6   (A) Abnormal value                      Assessment and Plan   Diagnoses and all orders for this visit:    1. Mixed hyperlipidemia (Primary)    2. Essential hypertension  -     amLODIPine (NORVASC) 2.5 MG tablet; Take 1 tablet by mouth Daily.  Dispense: 90 tablet; Refill: 3  -     losartan (COZAAR) 100 MG tablet; Take 1 tablet by mouth Daily.  Dispense: 90 tablet; Refill: 1    3. Acquired hypothyroidism  -     levothyroxine (SYNTHROID, LEVOTHROID) 100 MCG tablet; Take 1 tablet by mouth Daily.  Dispense: 90 tablet; Refill: 2    4. Chronic vaginitis  -     terconazole (Terazol 7) 0.4 % vaginal cream; Insert 1 applicator into the vagina Every Night.  Dispense: 45 g; Refill: 0    5. Type 2 diabetes mellitus with hyperglycemia, without long-term current use of insulin (HCC)  -     metFORMIN ER (GLUCOPHAGE-XR) 500 MG 24 hr tablet; Take 1 tablet by mouth Daily With Breakfast.  Dispense: 30 tablet; Refill: 3  -     Basic Metabolic Panel; Future  -     Hemoglobin A1c; Future      She prefers not to take statin therapy  Declines mammograms  Initiate metformin for hyperglycemia  Follow-up blood work in 3 months otherwise as needed or       Follow Up   Return in about 6 months (around 12/6/2022), or if symptoms worsen or fail to improve, for Recheck.  Patient was given instructions and counseling regarding her condition or for health maintenance advice. Please see specific information pulled into the AVS if appropriate.

## 2022-07-11 ENCOUNTER — TELEPHONE (OUTPATIENT)
Dept: INTERNAL MEDICINE | Facility: CLINIC | Age: 87
End: 2022-07-11

## 2022-07-11 DIAGNOSIS — E03.9 ACQUIRED HYPOTHYROIDISM: ICD-10-CM

## 2022-07-11 RX ORDER — LEVOTHYROXINE SODIUM 0.1 MG/1
100 TABLET ORAL DAILY
Qty: 90 TABLET | Refills: 1 | Status: SHIPPED | OUTPATIENT
Start: 2022-07-11 | End: 2023-04-04 | Stop reason: SDUPTHER

## 2022-07-11 NOTE — TELEPHONE ENCOUNTER
Caller: Marcelle Johnson    Relationship: Self    Best call back number: [phone]    Requested Prescriptions:   Requested Prescriptions     Pending Prescriptions Disp Refills   • levothyroxine (SYNTHROID, LEVOTHROID) 100 MCG tablet 90 tablet 2     Sig: Take 1 tablet by mouth Daily.        Pharmacy where request should be sent: [facility]Y #[id number] [place], [place] - [address] [address]. AT [facility] [phone] [facility] [phone] FX     Additional details provided by patient: APPROX FIVE DAYS LEFT    Does the patient have less than a 3 day supply:  [] Yes  [x] No    Hansa Castro Rep   07/11/22 13:36 EDT

## 2022-07-25 ENCOUNTER — TELEPHONE (OUTPATIENT)
Dept: INTERNAL MEDICINE | Facility: CLINIC | Age: 87
End: 2022-07-25

## 2022-07-25 NOTE — TELEPHONE ENCOUNTER
Recommend appointment with gynecologist if she has one because of persistent symptoms consistent with yeast with no improvement.

## 2022-07-25 NOTE — TELEPHONE ENCOUNTER
Caller: Marcelle Johnson    Relationship: Self    Best call back number: [phone]    What medication are you requesting:     What are your current symptoms: ITCHING IN VAGINAL AREA    How long have you been experiencing symptoms: SOME WEEKS NOW    Have you had these symptoms before:    [x] Yes  [] No    Have you been treated for these symptoms before:   [x] Yes  [] No    If a prescription is needed, what is your preferred pharmacy and phone number:  [facility]  [address] [facility]  [phone]    Additional notes: PATIENT IS CALLING IN STATING THAT HER VAGINAL HAS BEEN GOING ON FOR AWHILE BUT IT IS GETTING WORSE.  SHE WANTS TO KNOW IF SOMETHING CAN BE PRESCRIBED TO HELP HER.

## 2022-09-01 ENCOUNTER — LAB (OUTPATIENT)
Dept: LAB | Facility: HOSPITAL | Age: 87
End: 2022-09-01

## 2022-09-01 DIAGNOSIS — E11.65 TYPE 2 DIABETES MELLITUS WITH HYPERGLYCEMIA, WITHOUT LONG-TERM CURRENT USE OF INSULIN: ICD-10-CM

## 2022-09-01 LAB
ANION GAP SERPL CALCULATED.3IONS-SCNC: 9.7 MMOL/L (ref 5–15)
BUN SERPL-MCNC: 9 MG/DL (ref 8–23)
BUN/CREAT SERPL: 12.7 (ref 7–25)
CALCIUM SPEC-SCNC: 9.7 MG/DL (ref 8.6–10.5)
CHLORIDE SERPL-SCNC: 102 MMOL/L (ref 98–107)
CO2 SERPL-SCNC: 25.3 MMOL/L (ref 22–29)
CREAT SERPL-MCNC: 0.71 MG/DL (ref 0.57–1)
EGFRCR SERPLBLD CKD-EPI 2021: 81.4 ML/MIN/1.73
GLUCOSE SERPL-MCNC: 106 MG/DL (ref 65–99)
HBA1C MFR BLD: 6.2 % (ref 4.8–5.6)
POTASSIUM SERPL-SCNC: 4.3 MMOL/L (ref 3.5–5.2)
SODIUM SERPL-SCNC: 137 MMOL/L (ref 136–145)

## 2022-09-01 PROCEDURE — 80048 BASIC METABOLIC PNL TOTAL CA: CPT

## 2022-09-01 PROCEDURE — 83036 HEMOGLOBIN GLYCOSYLATED A1C: CPT

## 2022-09-01 PROCEDURE — 36415 COLL VENOUS BLD VENIPUNCTURE: CPT

## 2022-09-01 RX ORDER — METFORMIN HYDROCHLORIDE 500 MG/1
TABLET, EXTENDED RELEASE ORAL
Qty: 90 TABLET | Refills: 1 | Status: SHIPPED | OUTPATIENT
Start: 2022-09-01

## 2022-09-19 ENCOUNTER — OFFICE VISIT (OUTPATIENT)
Dept: INTERNAL MEDICINE | Facility: CLINIC | Age: 87
End: 2022-09-19

## 2022-09-19 ENCOUNTER — HOSPITAL ENCOUNTER (OUTPATIENT)
Dept: GENERAL RADIOLOGY | Facility: HOSPITAL | Age: 87
Discharge: HOME | End: 2022-09-19
Admitting: FAMILY MEDICINE

## 2022-09-19 VITALS
HEART RATE: 69 BPM | OXYGEN SATURATION: 99 % | HEIGHT: 63 IN | DIASTOLIC BLOOD PRESSURE: 50 MMHG | BODY MASS INDEX: 25.71 KG/M2 | WEIGHT: 145.1 LBS | SYSTOLIC BLOOD PRESSURE: 104 MMHG

## 2022-09-19 DIAGNOSIS — R07.89 OTHER CHEST PAIN: ICD-10-CM

## 2022-09-19 DIAGNOSIS — I10 ESSENTIAL HYPERTENSION: ICD-10-CM

## 2022-09-19 DIAGNOSIS — R06.00 DYSPNEA, UNSPECIFIED TYPE: Primary | ICD-10-CM

## 2022-09-19 PROBLEM — R06.02 SHORTNESS OF BREATH: Status: ACTIVE | Noted: 2022-09-19

## 2022-09-19 PROCEDURE — 99214 OFFICE O/P EST MOD 30 MIN: CPT | Performed by: FAMILY MEDICINE

## 2022-09-19 PROCEDURE — 71046 X-RAY EXAM CHEST 2 VIEWS: CPT

## 2022-09-19 RX ORDER — LOSARTAN POTASSIUM 50 MG/1
50 TABLET ORAL DAILY
Qty: 90 TABLET | Refills: 1
Start: 2022-09-19 | End: 2023-04-04

## 2022-09-19 NOTE — PROGRESS NOTES
"Chief Complaint  breathing issues    Subjective        Marcelle Johnson presents to [facility]  History of Present Illness  Patient appointment for acute new problem of dyspnea and difficulty breathing relates to recent dilation of esophagus no specific cough nausea nausea or vomiting no diarrhea  She has had multiple esophageal dilations over the last 2 years  She is followed by Dr. Ramirez  She does not check her blood pressure he has no dizziness or fainting she is on amlodipine and losartan  She does not have any specific chest pain  Objective   Vital Signs:  /50 (BP Location: Left arm, Patient Position: Sitting, Cuff Size: Adult)   Pulse 69   Ht 160 cm (62.99\")   Wt 65.8 kg (145 lb 1.6 oz)   SpO2 99%   BMI 25.71 kg/m²   Estimated body mass index is 25.71 kg/m² as calculated from the following:    Height as of this encounter: 160 cm (62.99\").    Weight as of this encounter: 65.8 kg (145 lb 1.6 oz).          Physical Exam  Vitals and nursing note reviewed.   Constitutional:       Appearance: Normal appearance.   HENT:      Head: Normocephalic and atraumatic.      Mouth/Throat:      Pharynx: No posterior oropharyngeal erythema.   Eyes:      General: No scleral icterus.  Cardiovascular:      Rate and Rhythm: Normal rate and regular rhythm.      Pulses: Normal pulses.      Heart sounds: Normal heart sounds.   Pulmonary:      Effort: Pulmonary effort is normal.      Breath sounds: Normal breath sounds.   Neurological:      Mental Status: She is alert.   Psychiatric:         Mood and Affect: Mood normal.         Behavior: Behavior normal.         Thought Content: Thought content normal.         Judgment: Judgment normal.        Result Review :    Common labs    Common Labsle 12/2/21 12/2/21 12/2/21 5/27/22 5/27/22 5/27/22 5/27/22 9/1/22 9/1/22    0828 0828 0828 0829 0829 0829 0829 [id number]   Glucose  126 (A)   129 (A)    106 (A)   BUN  8   8    9   Creatinine  0.91   0.75    0.71 "   eGFR Non  Am  58 (A)          Sodium  136   139    137   Potassium  4.3   4.2    4.3   Chloride  100   104    102   Calcium  9.1   9.1    9.7   Albumin  3.60   3.70       Total Bilirubin  0.3   0.2       Alkaline Phosphatase  58   82       AST (SGOT)  19   15       ALT (SGPT)  12   11       Total Cholesterol 160     178      Triglycerides 91     92      HDL Cholesterol 50     54      LDL Cholesterol  93     107 (A)      Hemoglobin A1C   6.32 (A) 6.90 (A)    6.20 (A)    Microalbumin, Urine       15.6     (A) Abnormal value            Gastroenterology procedure 9/6/2022 dilation of esophagus Holly         Assessment and Plan   Diagnoses and all orders for this visit:    1. Dyspnea, unspecified type (Primary)  -     Cancel: XR Chest 2 View  -     XR Chest 2 View    2. Essential hypertension  -     losartan (COZAAR) 50 MG tablet; Take 1 tablet by mouth Daily.  Dispense: 90 tablet; Refill: 1    3. Other chest pain  -     Cancel: XR Chest 2 View  -     XR Chest 2 View             Follow Up   Return if symptoms worsen or fail to improve, for Recheck.  Patient was given instructions and counseling regarding her condition or for health maintenance advice. Please see specific information pulled into the AVS if appropriate.

## 2023-01-31 NOTE — TELEPHONE ENCOUNTER
Caller: Marcelle Johnson    Relationship: Self    Best call back number: [phone]*    What medication are you requesting: COUGH MEDICATION    What are your current symptoms: COUGH, COUGHING UP MUCUS    How long have you been experiencing symptoms: 1 WEEK    Have you had these symptoms before:    [] Yes  [x] No    Have you been treated for these symptoms before:   [] Yes  [x] No    If a prescription is needed, what is your preferred pharmacy and phone number: [id number] [place], [place] - [address] [facility] AT [facility] [phone] [facility] [phone]      Additional notes: PATIENT REQUESTING A COUGH MEDICATION TO BE SENT TO HER PHARMACY.           AMA form in chart      [facility]  01/31/23 0615

## 2023-02-21 DIAGNOSIS — I10 ESSENTIAL (PRIMARY) HYPERTENSION: ICD-10-CM

## 2023-02-21 RX ORDER — LOSARTAN POTASSIUM 100 MG/1
TABLET ORAL
Qty: 90 TABLET | Refills: 0 | Status: SHIPPED | OUTPATIENT
Start: 2023-02-21

## 2023-04-04 ENCOUNTER — LAB (OUTPATIENT)
Dept: LAB | Facility: HOSPITAL | Age: 88
End: 2023-04-04
Payer: MEDICARE

## 2023-04-04 ENCOUNTER — OFFICE VISIT (OUTPATIENT)
Dept: INTERNAL MEDICINE | Facility: CLINIC | Age: 88
End: 2023-04-04
Payer: MEDICARE

## 2023-04-04 ENCOUNTER — TELEPHONE (OUTPATIENT)
Dept: INTERNAL MEDICINE | Facility: CLINIC | Age: 88
End: 2023-04-04

## 2023-04-04 VITALS
BODY MASS INDEX: 29.23 KG/M2 | SYSTOLIC BLOOD PRESSURE: 130 MMHG | HEART RATE: 72 BPM | RESPIRATION RATE: 16 BRPM | TEMPERATURE: 97.1 F | WEIGHT: 165 LBS | OXYGEN SATURATION: 98 % | HEIGHT: 63 IN | DIASTOLIC BLOOD PRESSURE: 70 MMHG

## 2023-04-04 DIAGNOSIS — Z12.31 SCREENING MAMMOGRAM, ENCOUNTER FOR: ICD-10-CM

## 2023-04-04 DIAGNOSIS — B37.31 VAGINAL YEAST INFECTION: ICD-10-CM

## 2023-04-04 DIAGNOSIS — E13.59 OTHER SPECIFIED DIABETES MELLITUS WITH OTHER CIRCULATORY COMPLICATION, WITHOUT LONG-TERM CURRENT USE OF INSULIN: ICD-10-CM

## 2023-04-04 DIAGNOSIS — E78.2 MIXED HYPERLIPIDEMIA: ICD-10-CM

## 2023-04-04 DIAGNOSIS — Z00.00 MEDICARE ANNUAL WELLNESS VISIT, SUBSEQUENT: ICD-10-CM

## 2023-04-04 DIAGNOSIS — I10 ESSENTIAL HYPERTENSION: Primary | ICD-10-CM

## 2023-04-04 DIAGNOSIS — E03.9 ACQUIRED HYPOTHYROIDISM: ICD-10-CM

## 2023-04-04 PROBLEM — N76.0 ACUTE VAGINITIS: Status: ACTIVE | Noted: 2022-06-06

## 2023-04-04 LAB
ALBUMIN SERPL-MCNC: 3.7 G/DL (ref 3.5–5.2)
ALBUMIN/GLOB SERPL: 1.2 G/DL
ALP SERPL-CCNC: 79 U/L (ref 39–117)
ALT SERPL W P-5'-P-CCNC: 10 U/L (ref 1–33)
ANION GAP SERPL CALCULATED.3IONS-SCNC: 9 MMOL/L (ref 5–15)
AST SERPL-CCNC: 16 U/L (ref 1–32)
BILIRUB SERPL-MCNC: 0.3 MG/DL (ref 0–1.2)
BUN SERPL-MCNC: 8 MG/DL (ref 8–23)
BUN/CREAT SERPL: 11.6 (ref 7–25)
CALCIUM SPEC-SCNC: 9.2 MG/DL (ref 8.2–9.6)
CHLORIDE SERPL-SCNC: 104 MMOL/L (ref 98–107)
CHOLEST SERPL-MCNC: 170 MG/DL (ref 0–200)
CO2 SERPL-SCNC: 26 MMOL/L (ref 22–29)
CREAT SERPL-MCNC: 0.69 MG/DL (ref 0.57–1)
EGFRCR SERPLBLD CKD-EPI 2021: 82.6 ML/MIN/1.73
GLOBULIN UR ELPH-MCNC: 3 GM/DL
GLUCOSE SERPL-MCNC: 115 MG/DL (ref 65–99)
HBA1C MFR BLD: 6.7 % (ref 4.8–5.6)
HDLC SERPL-MCNC: 55 MG/DL (ref 40–60)
LDLC SERPL CALC-MCNC: 97 MG/DL (ref 0–100)
LDLC/HDLC SERPL: 1.73 {RATIO}
POTASSIUM SERPL-SCNC: 4 MMOL/L (ref 3.5–5.2)
PROT SERPL-MCNC: 6.7 G/DL (ref 6–8.5)
SODIUM SERPL-SCNC: 139 MMOL/L (ref 136–145)
T4 FREE SERPL-MCNC: 1.47 NG/DL (ref 0.93–1.7)
TRIGL SERPL-MCNC: 99 MG/DL (ref 0–150)
TSH SERPL DL<=0.05 MIU/L-ACNC: 0.57 UIU/ML (ref 0.27–4.2)
VLDLC SERPL-MCNC: 18 MG/DL (ref 5–40)

## 2023-04-04 PROCEDURE — 83036 HEMOGLOBIN GLYCOSYLATED A1C: CPT | Performed by: FAMILY MEDICINE

## 2023-04-04 PROCEDURE — 80061 LIPID PANEL: CPT | Performed by: FAMILY MEDICINE

## 2023-04-04 PROCEDURE — 80053 COMPREHEN METABOLIC PANEL: CPT | Performed by: FAMILY MEDICINE

## 2023-04-04 PROCEDURE — 84439 ASSAY OF FREE THYROXINE: CPT | Performed by: FAMILY MEDICINE

## 2023-04-04 PROCEDURE — 36415 COLL VENOUS BLD VENIPUNCTURE: CPT | Performed by: FAMILY MEDICINE

## 2023-04-04 PROCEDURE — 84443 ASSAY THYROID STIM HORMONE: CPT | Performed by: FAMILY MEDICINE

## 2023-04-04 RX ORDER — FLUCONAZOLE 150 MG/1
150 TABLET ORAL ONCE
Qty: 10 TABLET | Refills: 0 | Status: SHIPPED | OUTPATIENT
Start: 2023-04-04 | End: 2023-04-04

## 2023-04-04 RX ORDER — TRIAMCINOLONE ACETONIDE 1 MG/G
1 CREAM TOPICAL 2 TIMES DAILY
Qty: 60 G | Refills: 0 | Status: SHIPPED | OUTPATIENT
Start: 2023-04-04 | End: 2023-04-11

## 2023-04-04 RX ORDER — LEVOTHYROXINE SODIUM 0.1 MG/1
100 TABLET ORAL DAILY
Qty: 90 TABLET | Refills: 1 | Status: SHIPPED | OUTPATIENT
Start: 2023-04-04

## 2023-04-04 RX ORDER — FLUCONAZOLE 150 MG/1
150 TABLET ORAL WEEKLY
Qty: 2 TABLET | Refills: 1 | Status: CANCELLED | OUTPATIENT
Start: 2023-04-04

## 2023-04-04 NOTE — TELEPHONE ENCOUNTER
Caller: Marcelle Johnson    Relationship: Self    Best call back number: [phone]    What was the call regarding: PATIENT STATED THAT SHE JUST LEFT THE OFFICE AND IS AT THE PHARMACY. PATIENT STATED THAT ALL THEY HAVE AT THE PHARMACY IS A PRESCRIPTION FOR A CREAM. PATIENT IS ASKING IF DR PLUMMER IS ALSO SENDING AN ANTIBIOTIC. PATIENT IS REQUESTING A CALL BACK ASAP. PLEASE ADVISE.    Do you require a callback: YES

## 2023-04-04 NOTE — PROGRESS NOTES
The ABCs of the Annual Wellness Visit  Subsequent Medicare Wellness Visit    Chief Complaint   Patient presents with   • Follow-up     6 month follow up, also has yeast infection   • Medicare Wellness-subsequent      Subjective    History of Present Illness:  Marcelle Johnson is a 89 y.o. female who presents for a Subsequent Medicare Wellness Visit.    The following portions of the patient's history were reviewed and   updated as appropriate: allergies, current medications, past family history, past medical history, past social history, past surgical history and problem list.     Compared to one year ago, the patient feels her physical   health is the same.    Compared to one year ago, the patient feels her mental   health is the same.    Recent Hospitalizations:  She was not admitted to the hospital during the last year.       Current Medical Providers:  Patient Care Team:  Jorge Luis Weinberg MD as PCP - General (Family Medicine)  Maria M Galicia MD as Consulting Physician (Hematology and Oncology)  James Lee MD as Surgeon (Breast Surgery)  James Lee MD as Referring Physician (Breast Surgery)    Outpatient Medications Prior to Visit   Medication Sig Dispense Refill   • acetaminophen (TYLENOL) 500 MG tablet Take 1 tablet by mouth Every 6 (Six) Hours As Needed for Mild Pain. 30 tablet 0   • amLODIPine (NORVASC) 2.5 MG tablet Take 1 tablet by mouth Daily. 90 tablet 3   • Ascorbic Acid 500 MG chewable tablet Chew 500 mg.     • benzonatate (TESSALON) 100 MG capsule Take 1 capsule by mouth 3 (Three) Times a Day As Needed for Cough. 30 capsule 0   • Calcium Citrate-Vitamin D (CALCIUM + D PO) Take 1,000 mg by mouth daily.     • cholecalciferol (VITAMIN D3) 25 MCG (1000 UT) tablet Take 1 tablet by mouth Daily.     • clotrimazole (LOTRIMIN) 1 % vaginal cream Insert  into the vagina 2 (Two) Times a Day. 45 g 0   • esomeprazole (nexIUM) 40 MG capsule TAKE 1 CAP BY MOUTH EVERY MORNING BEFORE BREAKFAST. CAN  OPEN CAPS AND PLACE IN APPLESAUCE IF NEEDED.     • FIBER PO Take 2 capsules by mouth Daily As Needed.     • fluconazole (Diflucan) 150 MG tablet Take 1 tablet by mouth 1 (One) Time Per Week. For 2 doses 2 tablet 1   • guaifenesin-dextromethorphan (MUCINEX DM)  MG tablet sustained-release 12 hour tablet Take 1 tablet by mouth 2 (Two) Times a Day As Needed (cough). 24 tablet 0   • losartan (COZAAR) 100 MG tablet TAKE 1 TABLET BY MOUTH EVERY DAY 90 tablet 0   • metFORMIN ER (GLUCOPHAGE-XR) 500 MG 24 hr tablet TAKE 1 TABLET BY MOUTH EVERY DAY WITH BREAKFAST 90 tablet 1   • sucralfate (CARAFATE) 1 g tablet TAKE 1 TABLET BY MOUTH 4 TIMES DAILY. DISSOLVE IN 15ML OF WATER TO MAKE A SLURRY PRIOR TO TAKING.     • vitamin C (ASCORBIC ACID) 500 MG tablet Take 1 tablet by mouth Daily As Needed.     • levothyroxine (SYNTHROID, LEVOTHROID) 100 MCG tablet Take 1 tablet by mouth Daily. 90 tablet 1   • losartan (COZAAR) 50 MG tablet Take 1 tablet by mouth Daily. 90 tablet 1   • terconazole (Terazol 7) 0.4 % vaginal cream Insert 1 applicator into the vagina Every Night. 45 g 0   • triamcinolone (KENALOG) 0.5 % ointment Apply 1 application topically to the appropriate area as directed 2 (Two) Times a Day. 15 g 2     No facility-administered medications prior to visit.       No opioid medication identified on active medication list. I have reviewed chart for other potential  high risk medication/s and harmful drug interactions in the elderly.          Aspirin is not on active medication list.  Aspirin use is not indicated based on review of current medical condition/s. Risk of harm outweighs potential benefits.  .    Patient Active Problem List   Diagnosis   • Abnormal brain scan   • Abnormal finding on thyroid function test   • Arthritis   • Diabetes mellitus   • Dysphagia   • Mixed hyperlipidemia   • Essential hypertension   • Hypothyroidism   • Osteoporosis   • Borderline diabetes   • Breast cancer of upper-outer quadrant of  "right female breast   • Medicare annual wellness visit, initial   • Hyponatremia   • Other osteoporosis without current pathological fracture   • Esophageal stricture   • Adenomatous polyp of colon   • Malignant neoplasm of colon   • Screening mammogram, encounter for   • Acute cystitis with hematuria   • Urge incontinence of urine   • Medicare annual wellness visit, subsequent   • Pigmented skin lesion of uncertain behavior of neck   • Acute vaginitis   • Dyspnea, unspecified   • Vaginal yeast infection     Advance Care Planning   Advance Directive is on file.  ACP discussion was held with the patient during this visit. Patient has an advance directive in EMR which is still valid.           Objective       Vitals:    23 1110   BP: 130/70   Pulse: 72   Resp: 16   Temp: 97.1 °F (36.2 °C)   SpO2: 98%   Weight: 74.8 kg (165 lb)   Height: 160 cm (63\")   PainSc: 0-No pain     BMI Readings from Last 1 Encounters:   23 29.23 kg/m²   BMI is above normal parameters. Recommendations include: nutrition counseling    Does the patient have evidence of cognitive impairment? No    Physical Exam            HEALTH RISK ASSESSMENT    Smoking Status:  Social History     Tobacco Use   Smoking Status Former   • Years: 5.00   • Types: Cigarettes   • Quit date:    • Years since quittin.2   Smokeless Tobacco Never     Alcohol Consumption:  Social History     Substance and Sexual Activity   Alcohol Use No     Fall Risk Screen:    STEADI Fall Risk Assessment was completed, and patient is at LOW risk for falls.Assessment completed on:2023    Depression Screening:  PHQ-2/PHQ-9 Depression Screening 2023   Retired PHQ-9 Total Score -   Retired Total Score -   Little Interest or Pleasure in Doing Things 0-->not at all   Feeling Down, Depressed or Hopeless 0-->not at all   PHQ-9: Brief Depression Severity Measure Score 0       Health Habits and Functional and Cognitive Screening:  Functional & Cognitive Status 2023 "   Do you have difficulty preparing food and eating? Yes   Do you have difficulty bathing yourself, getting dressed or grooming yourself? No   Do you have difficulty using the toilet? No   Do you have difficulty moving around from place to place? No   Do you have trouble with steps or getting out of a bed or a chair? No   Current Diet Well Balanced Diet   Dental Exam Up to date   Eye Exam Up to date   Exercise (times per week) 0 times per week   Current Exercises Include No Regular Exercise   Current Exercise Activities Include -   Do you need help using the phone?  No   Are you deaf or do you have serious difficulty hearing?  No   Do you need help with transportation? No   Do you need help shopping? No   Do you need help preparing meals?  No   Do you need help with housework?  No   Do you need help with laundry? No   Do you need help taking your medications? No   Do you need help managing money? No   Do you ever drive or ride in a car without wearing a seat belt? No   Have you felt unusual stress, anger or loneliness in the last month? No   Who do you live with? Alone   If you need help, do you have trouble finding someone available to you? No   Have you been bothered in the last four weeks by sexual problems? No   Do you have difficulty concentrating, remembering or making decisions? No       Age-appropriate Screening Schedule:  Refer to the list below for future screening recommendations based on patient's age, sex and/or medical conditions. Orders for these recommended tests are listed in the plan section. The patient has been provided with a written plan.    Health Maintenance   Topic Date Due   • ZOSTER VACCINE (2 of 3) 02/26/2015   • MAMMOGRAM  11/20/2021   • COVID-19 Vaccine (4 - Booster) 03/03/2022   • DIABETIC EYE EXAM  03/03/2022   • ANNUAL WELLNESS VISIT  12/01/2022   • DXA SCAN  02/23/2023   • HEMOGLOBIN A1C  03/01/2023   • LIPID PANEL  05/27/2023   • URINE MICROALBUMIN  05/27/2023   • INFLUENZA VACCINE   08/01/2023   • TDAP/TD VACCINES (2 - Td or Tdap) 08/24/2027   • Pneumococcal Vaccine 65+  Discontinued              Assessment & Plan     CMS Preventative Services Quick Reference  Risk Factors Identified During Encounter  Inactivity/Sedentary: Patient was advised to exercise at least 150 minutes a week per CDC recommendations.  The above risks/problems have been discussed with the patient.  Follow up actions/plans if indicated are seen below in the Assessment/Plan Section.  Pertinent information has been shared with the patient in the After Visit Summary.    Diagnoses and all orders for this visit:         Screening mammogram, encounter for     Medicare annual wellness visit, subsequent    Mammogram ordered        Follow Up:   Return if symptoms worsen or fail to improve, for Medicare Wellness.     An After Visit Summary and PPPS were given to the patient.  Ongoing  management of chronic medical problems.

## 2023-04-04 NOTE — PROGRESS NOTES
"Chief Complaint  Follow-up (6 month follow up, also has yeast infection) and Medicare Wellness-subsequent    Subjective        Marcelle Johnson presents to [facility]  History of Present Illness  Patient point for acute problem of yeast infection she has had these recurrent has not had 1 for quite some time recently she has tried some over-the-counter medication has not been helpful.  Previously she was treated with a combination of steroid Bactrim Diflucan and topical that seem to clear up the problem.  She has no fever sweats or chills  Objective   Vital Signs:  /70   Pulse 72   Temp 97.1 °F (36.2 °C)   Resp 16   Ht 160 cm (63\")   Wt 74.8 kg (165 lb)   SpO2 98%   BMI 29.23 kg/m²   Estimated body mass index is 29.23 kg/m² as calculated from the following:    Height as of this encounter: 160 cm (63\").    Weight as of this encounter: 74.8 kg (165 lb).             Physical Exam  Vitals reviewed.   Constitutional:       Appearance: Normal appearance.   Cardiovascular:      Rate and Rhythm: Normal rate and regular rhythm.      Pulses: Normal pulses.      Heart sounds: Normal heart sounds.   Pulmonary:      Effort: Pulmonary effort is normal.      Breath sounds: Normal breath sounds.   Skin:     Findings: Erythema present.   Neurological:      Mental Status: She is alert.   Psychiatric:         Mood and Affect: Mood normal.         Behavior: Behavior normal.         Thought Content: Thought content normal.         Judgment: Judgment normal.        Result Review :                   Assessment and Plan   Diagnoses and all orders for this visit:    1. Essential hypertension (Primary)  -     Comprehensive Metabolic Panel    2. Mixed hyperlipidemia  -     T4, Free  -     Lipid Panel    3. Acquired hypothyroidism  -     TSH  -     T4, Free  -     levothyroxine (SYNTHROID, LEVOTHROID) 100 MCG tablet; Take 1 tablet by mouth Daily.  Dispense: 90 tablet; Refill: 1    5. 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 Vaginal yeast infection    Other orders  -     fluconazole (Diflucan) 150 MG tablet; Take 1 tablet by mouth 1 (One) Time for 1 dose.  Dispense: 10 tablet; Refill: 0  -     triamcinolone (KENALOG) 0.1 % cream; Apply 1 application topically to the appropriate area as directed 2 (Two) Times a Day for 7 days.  Dispense: 60 g; Refill: 0             Follow Up   Return if symptoms worsen or fail to improve, for Recheck.  Patient was given instructions and counseling regarding her condition or for health maintenance advice. Please see specific information pulled into the AVS if appropriate.

## 2023-05-02 ENCOUNTER — HOSPITAL ENCOUNTER (OUTPATIENT)
Dept: MAMMOGRAPHY | Facility: HOSPITAL | Age: 88
Discharge: HOME | End: 2023-05-02
Admitting: FAMILY MEDICINE
Payer: MEDICARE

## 2023-05-02 DIAGNOSIS — Z12.31 SCREENING MAMMOGRAM, ENCOUNTER FOR: ICD-10-CM

## 2023-05-02 PROCEDURE — 77063 BREAST TOMOSYNTHESIS BI: CPT

## 2023-05-02 PROCEDURE — 77067 SCR MAMMO BI INCL CAD: CPT

## 2023-05-08 ENCOUNTER — TELEPHONE (OUTPATIENT)
Dept: INTERNAL MEDICINE | Facility: CLINIC | Age: 88
End: 2023-05-08

## 2023-05-08 RX ORDER — FLUCONAZOLE 150 MG/1
150 TABLET ORAL WEEKLY
Qty: 2 TABLET | Refills: 1 | Status: SHIPPED | OUTPATIENT
Start: 2023-05-08

## 2023-05-08 NOTE — TELEPHONE ENCOUNTER
Caller: Marcelle Johnson    Relationship: Self    Best call back number: [phone]    What medications are you currently taking:   Current Outpatient Medications on File Prior to Visit   Medication Sig Dispense Refill   • acetaminophen (TYLENOL) 500 MG tablet Take 1 tablet by mouth Every 6 (Six) Hours As Needed for Mild Pain. 30 tablet 0   • amLODIPine (NORVASC) 2.5 MG tablet Take 1 tablet by mouth Daily. 90 tablet 3   • Ascorbic Acid 500 MG chewable tablet Chew 500 mg.     • benzonatate (TESSALON) 100 MG capsule Take 1 capsule by mouth 3 (Three) Times a Day As Needed for Cough. 30 capsule 0   • Calcium Citrate-Vitamin D (CALCIUM + D PO) Take 1,000 mg by mouth daily.     • cholecalciferol (VITAMIN D3) 25 MCG (1000 UT) tablet Take 1 tablet by mouth Daily.     • clotrimazole (LOTRIMIN) 1 % vaginal cream Insert  into the vagina 2 (Two) Times a Day. 45 g 0   • esomeprazole (nexIUM) 40 MG capsule TAKE 1 CAP BY MOUTH EVERY MORNING BEFORE BREAKFAST. CAN OPEN CAPS AND PLACE IN APPLESAUCE IF NEEDED.     • FIBER PO Take 2 capsules by mouth Daily As Needed.     • fluconazole (Diflucan) 150 MG tablet Take 1 tablet by mouth 1 (One) Time Per Week. For 2 doses 2 tablet 1   • guaifenesin-dextromethorphan (MUCINEX DM)  MG tablet sustained-release 12 hour tablet Take 1 tablet by mouth 2 (Two) Times a Day As Needed (cough). 24 tablet 0   • levothyroxine (SYNTHROID, LEVOTHROID) 100 MCG tablet Take 1 tablet by mouth Daily. 90 tablet 1   • losartan (COZAAR) 100 MG tablet TAKE 1 TABLET BY MOUTH EVERY DAY 90 tablet 0   • metFORMIN ER (GLUCOPHAGE-XR) 500 MG 24 hr tablet TAKE 1 TABLET BY MOUTH EVERY DAY WITH BREAKFAST 90 tablet 1   • sucralfate (CARAFATE) 1 g tablet TAKE 1 TABLET BY MOUTH 4 TIMES DAILY. DISSOLVE IN 15ML OF WATER TO MAKE A SLURRY PRIOR TO TAKING.     • vitamin C (ASCORBIC ACID) 500 MG tablet Take 1 tablet by mouth Daily As Needed.       No current facility-administered medications on file prior to visit.      What are  your concerns: PATIENT STATES THAT THE ANTIBIOTIC THAT WAS CALLED IN FOR HER UTI DID NOT WORK. SHE STATES THAT IT MADE IT WORSE. SHE IS HAVING A LOT OF ITCHING, AND THERE ARE SCABS AND IT'S BLOODY AROUND HER VAGINAL AREA. PATIENT STATES THAT SHE SPOKE WITH A NURSE AND THEY SUGGESTED HER HAVING DIFLUCAN CALLED IN. PATIENT WANTED TO KNOW IF DR. PLUMMER CAN CALL THIS INTO [id number] - [place], [place] - [address] AT [facility] [phone] [facility] [phone]   OR IF DR. PLUMMER CAN REFER HER TO A FEMALE GYNECOLOGIST. PLEASE ADVISE PATIENT ASAP.

## 2023-05-08 NOTE — TELEPHONE ENCOUNTER
PATIENT STATES THAT THE ANTIBIOTIC THAT WAS CALLED IN FOR HER UTI DID NOT WORK. SHE STATES THAT IT MADE IT WORSE. SHE IS HAVING A LOT OF ITCHING, AND THERE ARE SCABS AND IT'S BLOODY AROUND HER VAGINAL AREA. PATIENT STATES THAT SHE SPOKE WITH A NURSE AND THEY SUGGESTED HER HAVING JJ CALLED IN. PATIENT WANTED TO KNOW IF DR. PLUMMER CAN CALL THIS IN

## 2023-05-22 DIAGNOSIS — I10 ESSENTIAL (PRIMARY) HYPERTENSION: ICD-10-CM

## 2023-05-22 RX ORDER — LOSARTAN POTASSIUM 100 MG/1
TABLET ORAL
Qty: 90 TABLET | Refills: 1 | Status: SHIPPED | OUTPATIENT
Start: 2023-05-22

## 2023-10-01 DIAGNOSIS — E03.9 ACQUIRED HYPOTHYROIDISM: ICD-10-CM

## 2023-10-02 RX ORDER — LEVOTHYROXINE SODIUM 0.1 MG/1
TABLET ORAL
Qty: 90 TABLET | Refills: 1 | Status: SHIPPED | OUTPATIENT
Start: 2023-10-02 | End: 2023-10-04 | Stop reason: SDUPTHER

## 2023-10-04 ENCOUNTER — OFFICE VISIT (OUTPATIENT)
Dept: INTERNAL MEDICINE | Facility: CLINIC | Age: 88
End: 2023-10-04
Payer: MEDICARE

## 2023-10-04 VITALS
OXYGEN SATURATION: 99 % | WEIGHT: 166.9 LBS | SYSTOLIC BLOOD PRESSURE: 138 MMHG | DIASTOLIC BLOOD PRESSURE: 56 MMHG | HEART RATE: 66 BPM | TEMPERATURE: 97.1 F | BODY MASS INDEX: 29.57 KG/M2 | HEIGHT: 63 IN

## 2023-10-04 DIAGNOSIS — E03.9 ACQUIRED HYPOTHYROIDISM: ICD-10-CM

## 2023-10-04 DIAGNOSIS — I10 ESSENTIAL HYPERTENSION: ICD-10-CM

## 2023-10-04 DIAGNOSIS — E13.59 OTHER SPECIFIED DIABETES MELLITUS WITH OTHER CIRCULATORY COMPLICATION, WITHOUT LONG-TERM CURRENT USE OF INSULIN: Primary | ICD-10-CM

## 2023-10-04 DIAGNOSIS — E11.65 TYPE 2 DIABETES MELLITUS WITH HYPERGLYCEMIA, WITHOUT LONG-TERM CURRENT USE OF INSULIN: ICD-10-CM

## 2023-10-04 DIAGNOSIS — E78.2 MIXED HYPERLIPIDEMIA: ICD-10-CM

## 2023-10-04 DIAGNOSIS — E11.65 TYPE 2 DIABETES MELLITUS WITH HYPERGLYCEMIA, WITHOUT LONG-TERM CURRENT USE OF INSULIN: Primary | ICD-10-CM

## 2023-10-04 PROBLEM — B37.31 VAGINAL YEAST INFECTION: Status: RESOLVED | Noted: 2023-04-04 | Resolved: 2023-10-04

## 2023-10-04 PROCEDURE — 90662 IIV NO PRSV INCREASED AG IM: CPT | Performed by: FAMILY MEDICINE

## 2023-10-04 PROCEDURE — G0008 ADMIN INFLUENZA VIRUS VAC: HCPCS | Performed by: FAMILY MEDICINE

## 2023-10-04 RX ORDER — LEVOTHYROXINE SODIUM 0.1 MG/1
100 TABLET ORAL DAILY
Qty: 90 TABLET | Refills: 1 | Status: SHIPPED | OUTPATIENT
Start: 2023-10-04

## 2023-10-04 RX ORDER — CLOBETASOL PROPIONATE 0.5 MG/G
OINTMENT TOPICAL EVERY 24 HOURS
COMMUNITY
Start: 2023-05-26

## 2023-10-04 NOTE — TELEPHONE ENCOUNTER
Rx Refill Note  Requested Prescriptions     Pending Prescriptions Disp Refills    levothyroxine (SYNTHROID, LEVOTHROID) 100 MCG tablet 90 tablet 1     Sig: Take 1 tablet by mouth Daily.      Last office visit with prescribing clinician: 10/4/2023   Last telemedicine visit with prescribing clinician: Visit date not found   Next office visit with prescribing clinician: 4/4/2024                         Would you like a call back once the refill request has been completed: [] Yes [] No    If the office needs to give you a call back, can they leave a voicemail: [] Yes [] No    Erika Branch MA  10/04/23, 11:40 EDT

## 2023-10-04 NOTE — PROGRESS NOTES
"Chief Complaint  Hypertension    Subjective        Marcelle Johnson presents to [facility]  History of Present Illness  Patient follows up for ongoing management of chronic problems of hypertension hyperlipidemia hypothyroidism diabetes she reduced her metformin since she had A1c in the 6s she does not take losartan for blood pressure anymore blood pressures well controlled with acetaminophen no unwanted side effects  Objective   Vital Signs:  /56   Pulse 66   Temp 97.1 °F (36.2 °C)   Ht 160 cm (62.99\")   Wt 75.7 kg (166 lb 14.4 oz)   SpO2 99%   BMI 29.57 kg/m²   Estimated body mass index is 29.57 kg/m² as calculated from the following:    Height as of this encounter: 160 cm (62.99\").    Weight as of this encounter: 75.7 kg (166 lb 14.4 oz).               Physical Exam  Vitals and nursing note reviewed.   HENT:      Head: Normocephalic.   Cardiovascular:      Rate and Rhythm: Normal rate and regular rhythm.      Pulses: Normal pulses.   Pulmonary:      Effort: Pulmonary effort is normal.      Breath sounds: Normal breath sounds.   Musculoskeletal:      Right lower leg: No edema.      Left lower leg: No edema.   Neurological:      Mental Status: She is alert.   Psychiatric:         Mood and Affect: Mood normal.         Behavior: Behavior normal.         Thought Content: Thought content normal.         Judgment: Judgment normal.      Result Review :    Common labs          4/4/2023    12:03   Common Labs   Glucose 115    BUN 8    Creatinine 0.69    Sodium 139    Potassium 4.0    Chloride 104    Calcium 9.2    Albumin 3.7    Total Bilirubin 0.3    Alkaline Phosphatase 79    AST (SGOT) 16    ALT (SGPT) 10    Total Cholesterol 170    Triglycerides 99    HDL Cholesterol 55    LDL Cholesterol  97    Hemoglobin A1C 6.70                   Assessment and Plan   Diagnoses and all orders for this visit:    1. 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 Essential hypertension  -     Basic Metabolic Panel    3. Mixed hyperlipidemia    4. Acquired hypothyroidism  -     TSH    Other orders  -     Fluzone High-Dose 65+yrs    Hyperlipidemia diet controlled  Follow-up results of testing otherwise as needed or         Follow Up   Return in about 6 months (around 4/4/2024), or if symptoms worsen or fail to improve, for Recheck.  Patient was given instructions and counseling regarding her condition or for health maintenance advice. Please see specific information pulled into the AVS if appropriate.

## 2023-10-05 LAB
BUN SERPL-MCNC: 9 MG/DL (ref 8–23)
BUN/CREAT SERPL: 12.9 (ref 7–25)
CALCIUM SERPL-MCNC: 9.5 MG/DL (ref 8.2–9.6)
CHLORIDE SERPL-SCNC: 100 MMOL/L (ref 98–107)
CO2 SERPL-SCNC: 27 MMOL/L (ref 22–29)
CREAT SERPL-MCNC: 0.7 MG/DL (ref 0.57–1)
EGFRCR SERPLBLD CKD-EPI 2021: 82.3 ML/MIN/1.73
GLUCOSE SERPL-MCNC: 137 MG/DL (ref 65–99)
HBA1C MFR BLD: 7.2 % (ref 4.8–5.6)
MICROALBUMIN UR-MCNC: 85.1 UG/ML
POTASSIUM SERPL-SCNC: 4.5 MMOL/L (ref 3.5–5.2)
SODIUM SERPL-SCNC: 135 MMOL/L (ref 136–145)
TSH SERPL DL<=0.005 MIU/L-ACNC: 1.48 UIU/ML (ref 0.27–4.2)
UNABLE TO VOID: NORMAL

## 2023-11-16 DIAGNOSIS — I10 ESSENTIAL (PRIMARY) HYPERTENSION: ICD-10-CM

## 2023-11-16 RX ORDER — LOSARTAN POTASSIUM 100 MG/1
TABLET ORAL
Qty: 90 TABLET | Refills: 1 | OUTPATIENT
Start: 2023-11-16

## 2023-11-28 DIAGNOSIS — I10 ESSENTIAL (PRIMARY) HYPERTENSION: ICD-10-CM

## 2023-11-29 RX ORDER — LOSARTAN POTASSIUM 100 MG/1
TABLET ORAL
Qty: 90 TABLET | Refills: 1 | OUTPATIENT
Start: 2023-11-29

## 2023-12-05 DIAGNOSIS — I10 ESSENTIAL (PRIMARY) HYPERTENSION: ICD-10-CM

## 2023-12-06 ENCOUNTER — TELEPHONE (OUTPATIENT)
Dept: INTERNAL MEDICINE | Facility: CLINIC | Age: 88
End: 2023-12-06
Payer: MEDICARE

## 2023-12-06 NOTE — TELEPHONE ENCOUNTER
Caller: Marcelle Johnson    Relationship: Self    Best call back number: [phone]    Requested Prescriptions:   Requested Prescriptions      No prescriptions requested or ordered in this encounter        [facility] [id number] [place] [facility] [address] [facility] [address] - [phone] [facility] [phone] FX     Last office visit with prescribing clinician: 10/4/2023   Last telemedicine visit with prescribing clinician: Visit date not found   Next office visit with prescribing clinician: 4/4/2024     Additional details provided by patient: PATIENT STATES THAT SHE NEEDS LOSARTAN POTASSIUM (NOT ON MED LIST) AND SHE IS PRESCRIBED THIS BY DR. PLUMMER. PLEASE SEND ASAP.     Does the patient have less than a 3 day supply:  [x] Yes  [] No    Would you like a call back once the refill request has been completed: [] Yes [x] No    If the office needs to give you a call back, can they leave a voicemail: [] Yes [x] No    Hansa Ortiz Rep   12/06/23 10:42 EST

## 2023-12-11 ENCOUNTER — TELEPHONE (OUTPATIENT)
Dept: INTERNAL MEDICINE | Facility: CLINIC | Age: 88
End: 2023-12-11

## 2023-12-11 RX ORDER — LOSARTAN POTASSIUM 100 MG/1
TABLET ORAL
Qty: 90 TABLET | Refills: 1 | OUTPATIENT
Start: 2023-12-11

## 2023-12-11 RX ORDER — LOSARTAN POTASSIUM 100 MG/1
100 TABLET ORAL DAILY
Qty: 90 TABLET | Refills: 1 | Status: SHIPPED | OUTPATIENT
Start: 2023-12-11

## 2023-12-11 NOTE — TELEPHONE ENCOUNTER
Prescription for losartan sent to pharmacy noted the visit of the October indicates patient was not taking the medication at that time however since she is taking it she needs to stay on the losartan at 100 mg daily

## 2023-12-11 NOTE — TELEPHONE ENCOUNTER
Caller: Marcelle Johnson    Relationship: Self    Best call back number: [phone]    Requested Prescriptions: LOSARTAN POTASSIUM 100 MG TAB     Pharmacy where request should be sent: [id number] [place] [facility] [id number] [place] [address] 100 - [phone] [facility] [phone] FX     Last office visit with prescribing clinician: 10/4/2023   Last telemedicine visit with prescribing clinician: Visit date not found   Next office visit with prescribing clinician: 4/4/2024     Additional details provided by patient: PATIENT HAS 2 TAB LEFT... PATIENT STATED PHARMACY TD HER THEY HAD BEEN TRYING TO GET IN CONTACT BUT HAS HEARD NOTHING BACK    Does the patient have less than a 3 day supply:  [x] Yes  [] No    Would you like a call back once the refill request has been completed: [] Yes [x] No    If the office needs to give you a call back, can they leave a voicemail: [] Yes [x] No    Hansa Fu Rep   12/11/23 12:55 EST

## 2024-03-25 DIAGNOSIS — E03.9 ACQUIRED HYPOTHYROIDISM: ICD-10-CM

## 2024-03-25 RX ORDER — LOSARTAN POTASSIUM 100 MG/1
100 TABLET ORAL DAILY
Qty: 90 TABLET | Refills: 0 | Status: SHIPPED | OUTPATIENT
Start: 2024-03-25

## 2024-03-25 RX ORDER — LEVOTHYROXINE SODIUM 0.1 MG/1
100 TABLET ORAL DAILY
Qty: 90 TABLET | Refills: 0 | Status: SHIPPED | OUTPATIENT
Start: 2024-03-25

## 2024-04-09 ENCOUNTER — LAB (OUTPATIENT)
Dept: LAB | Facility: HOSPITAL | Age: 89
End: 2024-04-09
Payer: MEDICARE

## 2024-04-09 ENCOUNTER — OFFICE VISIT (OUTPATIENT)
Dept: INTERNAL MEDICINE | Facility: CLINIC | Age: 89
End: 2024-04-09
Payer: MEDICARE

## 2024-04-09 VITALS
DIASTOLIC BLOOD PRESSURE: 74 MMHG | TEMPERATURE: 97.5 F | HEART RATE: 65 BPM | OXYGEN SATURATION: 99 % | WEIGHT: 156.4 LBS | BODY MASS INDEX: 27.71 KG/M2 | HEIGHT: 63 IN | SYSTOLIC BLOOD PRESSURE: 128 MMHG

## 2024-04-09 DIAGNOSIS — K22.2 ESOPHAGEAL STRICTURE: ICD-10-CM

## 2024-04-09 DIAGNOSIS — I10 ESSENTIAL HYPERTENSION: ICD-10-CM

## 2024-04-09 DIAGNOSIS — Z00.00 MEDICARE ANNUAL WELLNESS VISIT, SUBSEQUENT: ICD-10-CM

## 2024-04-09 DIAGNOSIS — E13.59 OTHER SPECIFIED DIABETES MELLITUS WITH OTHER CIRCULATORY COMPLICATION, WITHOUT LONG-TERM CURRENT USE OF INSULIN: ICD-10-CM

## 2024-04-09 DIAGNOSIS — E03.9 ACQUIRED HYPOTHYROIDISM: Primary | ICD-10-CM

## 2024-04-09 LAB
ALBUMIN SERPL-MCNC: 3.6 G/DL (ref 3.5–5.2)
ALBUMIN/GLOB SERPL: 1.3 G/DL
ALP SERPL-CCNC: 78 U/L (ref 39–117)
ALT SERPL W P-5'-P-CCNC: 7 U/L (ref 1–33)
ANION GAP SERPL CALCULATED.3IONS-SCNC: 10.1 MMOL/L (ref 5–15)
AST SERPL-CCNC: 9 U/L (ref 1–32)
BILIRUB SERPL-MCNC: <0.2 MG/DL (ref 0–1.2)
BUN SERPL-MCNC: 7 MG/DL (ref 8–23)
BUN/CREAT SERPL: 9.6 (ref 7–25)
CALCIUM SPEC-SCNC: 9.2 MG/DL (ref 8.2–9.6)
CHLORIDE SERPL-SCNC: 101 MMOL/L (ref 98–107)
CHOLEST SERPL-MCNC: 170 MG/DL (ref 0–200)
CO2 SERPL-SCNC: 24.9 MMOL/L (ref 22–29)
CREAT SERPL-MCNC: 0.73 MG/DL (ref 0.57–1)
EGFRCR SERPLBLD CKD-EPI 2021: 77.8 ML/MIN/1.73
GLOBULIN UR ELPH-MCNC: 2.7 GM/DL
GLUCOSE SERPL-MCNC: 126 MG/DL (ref 65–99)
HBA1C MFR BLD: 6.5 % (ref 4.8–5.6)
HDLC SERPL-MCNC: 42 MG/DL (ref 40–60)
LDLC SERPL CALC-MCNC: 109 MG/DL (ref 0–100)
LDLC/HDLC SERPL: 2.55 {RATIO}
POTASSIUM SERPL-SCNC: 4.2 MMOL/L (ref 3.5–5.2)
PROT SERPL-MCNC: 6.3 G/DL (ref 6–8.5)
SODIUM SERPL-SCNC: 136 MMOL/L (ref 136–145)
TRIGL SERPL-MCNC: 104 MG/DL (ref 0–150)
TSH SERPL DL<=0.05 MIU/L-ACNC: 1.01 UIU/ML (ref 0.27–4.2)
VLDLC SERPL-MCNC: 19 MG/DL (ref 5–40)

## 2024-04-09 PROCEDURE — 80061 LIPID PANEL: CPT | Performed by: FAMILY MEDICINE

## 2024-04-09 PROCEDURE — 83036 HEMOGLOBIN GLYCOSYLATED A1C: CPT | Performed by: FAMILY MEDICINE

## 2024-04-09 PROCEDURE — 80053 COMPREHEN METABOLIC PANEL: CPT | Performed by: FAMILY MEDICINE

## 2024-04-09 PROCEDURE — 36415 COLL VENOUS BLD VENIPUNCTURE: CPT | Performed by: FAMILY MEDICINE

## 2024-04-09 PROCEDURE — 1170F FXNL STATUS ASSESSED: CPT | Performed by: FAMILY MEDICINE

## 2024-04-09 PROCEDURE — 99214 OFFICE O/P EST MOD 30 MIN: CPT | Performed by: FAMILY MEDICINE

## 2024-04-09 PROCEDURE — 84443 ASSAY THYROID STIM HORMONE: CPT | Performed by: FAMILY MEDICINE

## 2024-04-09 PROCEDURE — G0439 PPPS, SUBSEQ VISIT: HCPCS | Performed by: FAMILY MEDICINE

## 2024-04-09 NOTE — PROGRESS NOTES
"Chief Complaint  Medicare Wellness-subsequent    Subjective        Marcelle Johnson presents to [facility]  History of Present Illness  Patient point to discuss dry skin and fluctuating blood pressures sometimes sugar readings greater than 150 she has no headache no chest pain she does not check her blood sugars she says she is not taking metformin anymore she is not having any increased urinating she does complain of dry skin that is developed over the last month as well she uses a lot of lotion, bring her medications with her she lives alone her daughter checks in on her regularly  She does not know which blood pressure medicine she stopped  Objective   Vital Signs:  /74   Pulse 65   Temp 97.5 °F (36.4 °C)   Ht 160 cm (62.99\")   Wt 70.9 kg (156 lb 6.4 oz)   SpO2 99%   BMI 27.71 kg/m²   Estimated body mass index is 27.71 kg/m² as calculated from the following:    Height as of this encounter: 160 cm (62.99\").    Weight as of this encounter: 70.9 kg (156 lb 6.4 oz).               Physical Exam  Vitals and nursing note reviewed.   Constitutional:       Appearance: Normal appearance.   HENT:      Head: Normocephalic and atraumatic.   Cardiovascular:      Rate and Rhythm: Normal rate and regular rhythm.      Pulses: Normal pulses.      Heart sounds: Normal heart sounds.   Pulmonary:      Effort: Pulmonary effort is normal.      Breath sounds: Normal breath sounds.   Abdominal:      Tenderness: There is no right CVA tenderness or left CVA tenderness.   Musculoskeletal:      Right lower leg: No edema.      Left lower leg: No edema.   Neurological:      Mental Status: She is alert.   Psychiatric:         Mood and Affect: Mood normal.         Behavior: Behavior normal.         Thought Content: Thought content normal.         Judgment: Judgment normal.        Result Review :      Common labs          10/4/2023    10:39 10/4/2023    11:42   Common Labs   Glucose 137     BUN 9   "   Creatinine 0.70     Sodium 135     Potassium 4.5     Chloride 100     Calcium 9.5     Hemoglobin A1C 7.20     Microalbumin, Urine  85.1                   Assessment and Plan     Diagnoses and all orders for this visit:    1. Acquired hypothyroidism (Primary)  -     Cancel: TSH  -     TSH    2. Essential hypertension  -     Cancel: Lipid Panel  -     Cancel: Comprehensive Metabolic Panel  -     Comprehensive Metabolic Panel  -     Lipid Panel    3. 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  Patient was given instructions and counseling regarding her condition or for health maintenance advice. Please see specific information pulled into the AVS if appropriate.

## 2024-04-09 NOTE — PROGRESS NOTES
The ABCs of the Annual Wellness Visit  Subsequent Medicare Wellness Visit    Subjective      Marcelle Johnson is a 89 y.o. female who presents for a Subsequent Medicare Wellness Visit.    The following portions of the patient's history were reviewed and   updated as appropriate: allergies, current medications, past family history, past medical history, past social history, past surgical history, and problem list.    Compared to one year ago, the patient feels her physical   health is the same.    Compared to one year ago, the patient feels her mental   health is the same.    Recent Hospitalizations:  She was not admitted to the hospital during the last year.       Current Medical Providers:  Patient Care Team:  Jorge Luis Weinberg MD as PCP - General (Family Medicine)  Maria M Galicia MD as Consulting Physician (Hematology and Oncology)  James Lee MD as Surgeon (Breast Surgery)  James Lee MD as Referring Physician (Breast Surgery)    Outpatient Medications Prior to Visit   Medication Sig Dispense Refill    acetaminophen (TYLENOL) 500 MG tablet Take 1 tablet by mouth Every 6 (Six) Hours As Needed for Mild Pain. 30 tablet 0    Ascorbic Acid 500 MG chewable tablet Chew 500 mg.      Calcium Citrate-Vitamin D (CALCIUM + D PO) Take 1,000 mg by mouth daily.      cholecalciferol (VITAMIN D3) 25 MCG (1000 UT) tablet Take 1 tablet by mouth Daily.      esomeprazole (nexIUM) 40 MG capsule TAKE 1 CAP BY MOUTH EVERY MORNING BEFORE BREAKFAST. CAN OPEN CAPS AND PLACE IN APPLESAUCE IF NEEDED.      FIBER PO Take 2 capsules by mouth Daily As Needed.      levothyroxine (SYNTHROID, LEVOTHROID) 100 MCG tablet TAKE 1 TABLET BY MOUTH EVERY DAY 90 tablet 0    losartan (COZAAR) 100 MG tablet TAKE 1 TABLET BY MOUTH EVERY DAY 90 tablet 0    amLODIPine (NORVASC) 2.5 MG tablet Take 1 tablet by mouth Daily. (Patient not taking: Reported on 4/9/2024) 90 tablet 3    metFORMIN ER (GLUCOPHAGE-XR) 500 MG 24 hr tablet TAKE 1 TABLET BY  MOUTH EVERY DAY WITH BREAKFAST (Patient not taking: Reported on 10/4/2023) 90 tablet 1    sucralfate (CARAFATE) 1 g tablet TAKE 1 TABLET BY MOUTH 4 TIMES DAILY. DISSOLVE IN 15ML OF WATER TO MAKE A SLURRY PRIOR TO TAKING. (Patient not taking: Reported on 4/9/2024)      vitamin C (ASCORBIC ACID) 500 MG tablet Take 1 tablet by mouth Daily As Needed. (Patient not taking: Reported on 10/4/2023)      clobetasol (TEMOVATE) 0.05 % ointment Apply  topically to the appropriate area as directed Daily. (Patient not taking: Reported on 4/9/2024)      fluconazole (Diflucan) 150 MG tablet Take 1 tablet by mouth 1 (One) Time Per Week. For 2 doses (Patient not taking: Reported on 10/4/2023) 2 tablet 1    guaifenesin-dextromethorphan (MUCINEX DM)  MG tablet sustained-release 12 hour tablet Take 1 tablet by mouth 2 (Two) Times a Day As Needed (cough). (Patient not taking: Reported on 10/4/2023) 24 tablet 0     No facility-administered medications prior to visit.       No opioid medication identified on active medication list. I have reviewed chart for other potential  high risk medication/s and harmful drug interactions in the elderly.        Aspirin is not on active medication list.  Aspirin use is not indicated based on review of current medical condition/s. Risk of harm outweighs potential benefits.  .    Patient Active Problem List   Diagnosis    Abnormal brain scan    Abnormal finding on thyroid function test    Arthritis    Diabetes mellitus    Dysphagia    Mixed hyperlipidemia    Essential hypertension    Hypothyroidism    Osteoporosis    Borderline diabetes    Breast cancer of upper-outer quadrant of right female breast    Medicare annual wellness visit, initial    Hyponatremia    Other osteoporosis without current pathological fracture    Esophageal stricture    Adenomatous polyp of colon    Malignant neoplasm of colon    Screening mammogram, encounter for    Acute cystitis with hematuria    Urge incontinence of urine  "   Medicare annual wellness visit, subsequent    Pigmented skin lesion of uncertain behavior of neck    Acute vaginitis    Dyspnea, unspecified     Advance Care Planning   Advance Care Planning     Advance Directive is on file.  ACP discussion was held with the patient during this visit. Patient has an advance directive in EMR which is still valid.      Objective    Vitals:    24 0821   BP: 128/74   Pulse: 65   Temp: 97.5 °F (36.4 °C)   SpO2: 99%   Weight: 70.9 kg (156 lb 6.4 oz)   Height: 160 cm (62.99\")     Estimated body mass index is 27.71 kg/m² as calculated from the following:    Height as of this encounter: 160 cm (62.99\").    Weight as of this encounter: 70.9 kg (156 lb 6.4 oz).    BMI is >= 25 and <30. (Overweight) The following options were offered after discussion;: weight loss educational material (shared in after visit summary)      Does the patient have evidence of cognitive impairment?   No            HEALTH RISK ASSESSMENT    Smoking Status:  Social History     Tobacco Use   Smoking Status Former    Current packs/day: 0.00    Average packs/day: 1 pack/day for 5.0 years (5.0 ttl pk-yrs)    Types: Cigarettes    Start date:     Quit date:     Years since quittin.3   Smokeless Tobacco Never     Alcohol Consumption:  Social History     Substance and Sexual Activity   Alcohol Use No     Fall Risk Screen:    GUILLERMINAADI Fall Risk Assessment was completed, and patient is at LOW risk for falls.Assessment completed on:2024    Depression Screenin/9/2024     8:26 AM   PHQ-2/PHQ-9 Depression Screening   Little Interest or Pleasure in Doing Things 0-->not at all   Feeling Down, Depressed or Hopeless 0-->not at all   PHQ-9: Brief Depression Severity Measure Score 0       Health Habits and Functional and Cognitive Screenin/9/2024     8:26 AM   Functional & Cognitive Status   Do you have difficulty preparing food and eating? No   Do you have difficulty bathing yourself, getting " dressed or grooming yourself? No   Do you have difficulty using the toilet? No   Do you have difficulty moving around from place to place? No   Do you have trouble with steps or getting out of a bed or a chair? No   Current Diet Well Balanced Diet   Dental Exam Up to date   Eye Exam Up to date   Exercise (times per week) 0 times per week   Current Exercises Include No Regular Exercise   Do you need help using the phone?  No   Are you deaf or do you have serious difficulty hearing?  No   Do you need help to go to places out of walking distance? No   Do you need help shopping? No   Do you need help preparing meals?  No   Do you need help with housework?  No   Do you need help with laundry? No   Do you need help taking your medications? No   Do you need help managing money? No   Do you ever drive or ride in a car without wearing a seat belt? No   Have you felt unusual stress, anger or loneliness in the last month? No   Who do you live with? Alone   If you need help, do you have trouble finding someone available to you? No   Have you been bothered in the last four weeks by sexual problems? No   Do you have difficulty concentrating, remembering or making decisions? No       Age-appropriate Screening Schedule:  Refer to the list below for future screening recommendations based on patient's age, sex and/or medical conditions. Orders for these recommended tests are listed in the plan section. The patient has been provided with a written plan.    Health Maintenance   Topic Date Due    RSV Vaccine - Adults (1 - 1-dose 60+ series) Never done    ZOSTER VACCINE (2 of 3) 02/26/2015    DIABETIC EYE EXAM  03/03/2022    DXA SCAN  02/23/2023    COVID-19 Vaccine (4 - 2023-24 season) 09/01/2023    ANNUAL WELLNESS VISIT  04/04/2024    LIPID PANEL  04/04/2024    HEMOGLOBIN A1C  04/04/2024    MAMMOGRAM  05/02/2024    INFLUENZA VACCINE  08/01/2024    URINE MICROALBUMIN  10/04/2024    BMI FOLLOWUP  04/09/2025    TDAP/TD VACCINES (2 - Td or  Tdap) 08/24/2027    Pneumococcal Vaccine 65+  Discontinued                  CMS Preventative Services Quick Reference  Risk Factors Identified During Encounter:    Inactivity/Sedentary:  increase daily walking no worries about falling    The above risks/problems have been discussed with the patient.  Pertinent information has been shared with the patient in the After Visit Summary.    Diagnoses and all orders for this visit:    1. Acquired hypothyroidism (Primary)  -     Cancel: TSH  -     TSH    2. Essential hypertension  -     Cancel: Lipid Panel  -     Cancel: Comprehensive Metabolic Panel  -     Comprehensive Metabolic Panel  -     Lipid Panel    3. Esophageal stricture    4. Medicare annual wellness visit, subsequent    5. 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      An After Visit Summary and PPPS were made available to the patient.

## 2024-06-21 DIAGNOSIS — E03.9 ACQUIRED HYPOTHYROIDISM: ICD-10-CM

## 2024-06-24 RX ORDER — LOSARTAN POTASSIUM 100 MG/1
100 TABLET ORAL DAILY
Qty: 90 TABLET | Refills: 0 | Status: SHIPPED | OUTPATIENT
Start: 2024-06-24

## 2024-06-24 RX ORDER — LEVOTHYROXINE SODIUM 0.1 MG/1
100 TABLET ORAL DAILY
Qty: 90 TABLET | Refills: 0 | Status: SHIPPED | OUTPATIENT
Start: 2024-06-24

## 2024-09-12 DIAGNOSIS — E03.9 ACQUIRED HYPOTHYROIDISM: ICD-10-CM

## 2024-09-12 RX ORDER — LEVOTHYROXINE SODIUM 100 UG/1
100 TABLET ORAL DAILY
Qty: 90 TABLET | Refills: 0 | Status: SHIPPED | OUTPATIENT
Start: 2024-09-12

## 2024-10-29 ENCOUNTER — OFFICE VISIT (OUTPATIENT)
Dept: INTERNAL MEDICINE | Facility: CLINIC | Age: 89
End: 2024-10-29
Payer: MEDICARE

## 2024-10-29 ENCOUNTER — LAB (OUTPATIENT)
Dept: LAB | Facility: HOSPITAL | Age: 89
End: 2024-10-29
Payer: MEDICARE

## 2024-10-29 VITALS
TEMPERATURE: 97 F | HEART RATE: 84 BPM | DIASTOLIC BLOOD PRESSURE: 70 MMHG | WEIGHT: 157.5 LBS | SYSTOLIC BLOOD PRESSURE: 124 MMHG | HEIGHT: 63 IN | BODY MASS INDEX: 27.91 KG/M2 | OXYGEN SATURATION: 99 %

## 2024-10-29 DIAGNOSIS — I10 ESSENTIAL HYPERTENSION: Primary | ICD-10-CM

## 2024-10-29 DIAGNOSIS — E78.2 MIXED HYPERLIPIDEMIA: ICD-10-CM

## 2024-10-29 DIAGNOSIS — E13.59 OTHER SPECIFIED DIABETES MELLITUS WITH OTHER CIRCULATORY COMPLICATION, WITHOUT LONG-TERM CURRENT USE OF INSULIN: ICD-10-CM

## 2024-10-29 DIAGNOSIS — M81.8 OTHER OSTEOPOROSIS WITHOUT CURRENT PATHOLOGICAL FRACTURE: ICD-10-CM

## 2024-10-29 DIAGNOSIS — E03.9 ACQUIRED HYPOTHYROIDISM: ICD-10-CM

## 2024-10-29 DIAGNOSIS — M81.0 AGE RELATED OSTEOPOROSIS, UNSPECIFIED PATHOLOGICAL FRACTURE PRESENCE: ICD-10-CM

## 2024-10-29 LAB
25(OH)D3 SERPL-MCNC: 29.8 NG/ML (ref 30–100)
ALBUMIN SERPL-MCNC: 3.6 G/DL (ref 3.5–5.2)
ALBUMIN/GLOB SERPL: 1.4 G/DL
ALP SERPL-CCNC: 71 U/L (ref 39–117)
ALT SERPL W P-5'-P-CCNC: 8 U/L (ref 1–33)
ANION GAP SERPL CALCULATED.3IONS-SCNC: 8.9 MMOL/L (ref 5–15)
AST SERPL-CCNC: 14 U/L (ref 1–32)
BILIRUB SERPL-MCNC: 0.3 MG/DL (ref 0–1.2)
BUN SERPL-MCNC: 11 MG/DL (ref 8–23)
BUN/CREAT SERPL: 14.7 (ref 7–25)
CALCIUM SPEC-SCNC: 9 MG/DL (ref 8.2–9.6)
CHLORIDE SERPL-SCNC: 99 MMOL/L (ref 98–107)
CHOLEST SERPL-MCNC: 172 MG/DL (ref 0–200)
CO2 SERPL-SCNC: 25.1 MMOL/L (ref 22–29)
CREAT SERPL-MCNC: 0.75 MG/DL (ref 0.57–1)
EGFRCR SERPLBLD CKD-EPI 2021: 75.3 ML/MIN/1.73
GLOBULIN UR ELPH-MCNC: 2.5 GM/DL
GLUCOSE SERPL-MCNC: 111 MG/DL (ref 65–99)
HBA1C MFR BLD: 6.1 % (ref 4.8–5.6)
HDLC SERPL-MCNC: 58 MG/DL (ref 40–60)
LDLC SERPL CALC-MCNC: 98 MG/DL (ref 0–100)
LDLC/HDLC SERPL: 1.68 {RATIO}
POTASSIUM SERPL-SCNC: 4.2 MMOL/L (ref 3.5–5.2)
PROT SERPL-MCNC: 6.1 G/DL (ref 6–8.5)
SODIUM SERPL-SCNC: 133 MMOL/L (ref 136–145)
TRIGL SERPL-MCNC: 84 MG/DL (ref 0–150)
VLDLC SERPL-MCNC: 16 MG/DL (ref 5–40)

## 2024-10-29 PROCEDURE — 99214 OFFICE O/P EST MOD 30 MIN: CPT | Performed by: FAMILY MEDICINE

## 2024-10-29 PROCEDURE — 80053 COMPREHEN METABOLIC PANEL: CPT | Performed by: FAMILY MEDICINE

## 2024-10-29 PROCEDURE — 36415 COLL VENOUS BLD VENIPUNCTURE: CPT | Performed by: FAMILY MEDICINE

## 2024-10-29 PROCEDURE — 1126F AMNT PAIN NOTED NONE PRSNT: CPT | Performed by: FAMILY MEDICINE

## 2024-10-29 PROCEDURE — 80061 LIPID PANEL: CPT | Performed by: FAMILY MEDICINE

## 2024-10-29 PROCEDURE — 1160F RVW MEDS BY RX/DR IN RCRD: CPT | Performed by: FAMILY MEDICINE

## 2024-10-29 PROCEDURE — 1159F MED LIST DOCD IN RCRD: CPT | Performed by: FAMILY MEDICINE

## 2024-10-29 PROCEDURE — 82306 VITAMIN D 25 HYDROXY: CPT | Performed by: FAMILY MEDICINE

## 2024-10-29 PROCEDURE — 83036 HEMOGLOBIN GLYCOSYLATED A1C: CPT | Performed by: FAMILY MEDICINE

## 2024-10-29 PROCEDURE — G2211 COMPLEX E/M VISIT ADD ON: HCPCS | Performed by: FAMILY MEDICINE

## 2024-10-29 NOTE — PROGRESS NOTES
"Chief Complaint  Hypothyroidism and Hypertension    Subjective        Marcelle Johnson presents to [facility]  History of Present Illness  Follow-up ongoing management of chronic medical problems of hypertension hyperlipidemia diabetes and hypothyroidism she stopped taking her blood pressure medicine as well as her metformin she has no increased thirst no headaches she does not check her blood pressure she has minimal activity mostly sitting around the house she has known osteoporosis failed to have Prolia injections previously  Falling uses a cane intermittently  Objective   Vital Signs:  /70   Pulse 84   Temp 97 °F (36.1 °C) (Temporal)   Ht 160 cm (62.99\")   Wt 71.4 kg (157 lb 8 oz)   SpO2 99%   BMI 27.91 kg/m²   Estimated body mass index is 27.91 kg/m² as calculated from the following:    Height as of this encounter: 160 cm (62.99\").    Weight as of this encounter: 71.4 kg (157 lb 8 oz).            Physical Exam  Vitals and nursing note reviewed.   Constitutional:       Appearance: Normal appearance.   HENT:      Right Ear: There is impacted cerumen.      Left Ear: There is impacted cerumen.      Nose: No congestion.      Mouth/Throat:      Pharynx: No posterior oropharyngeal erythema.   Eyes:      General: No scleral icterus.  Cardiovascular:      Rate and Rhythm: Normal rate and regular rhythm.   Pulmonary:      Effort: Pulmonary effort is normal.      Breath sounds: Normal breath sounds.   Musculoskeletal:      Right lower leg: No edema.      Left lower leg: No edema.   Skin:     General: Skin is dry.   Neurological:      Mental Status: She is alert.   Psychiatric:         Mood and Affect: Mood normal.         Behavior: Behavior normal.         Thought Content: Thought content normal.         Judgment: Judgment normal.        Result Review :    Common labs          4/9/2024    09:20   Common Labs   Glucose 126    BUN 7    Creatinine 0.73    Sodium 136    Potassium 4.2  " "  Chloride 101    Calcium 9.2    Albumin 3.6    Total Bilirubin <0.2    Alkaline Phosphatase 78    AST (SGOT) 9    ALT (SGPT) 7    Total Cholesterol 170    Triglycerides 104    HDL Cholesterol 42    LDL Cholesterol  109    Hemoglobin A1C 6.50                Assessment and Plan   Diagnoses and all orders for this visit:    1. Essential hypertension (Primary)  -     Comprehensive Metabolic Panel    2. Mixed hyperlipidemia  -     Lipid Panel    3. 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 Acquired hypothyroidism    5. Other osteoporosis without current pathological fracture    6. Age related osteoporosis, unspecified pathological fracture presence  -     Vitamin D,25-Hydroxy    Hypothyroidism continue levothyroxine  Osteoporosis encouraged Prolia injection continue vitamin D calcium daily  Follow-up results of testing     This patient has a PCP that is the continuing focal point for all health care services, and the patient sees this physician to be evaluated for osteoporosis. The inherent complexity that this code () captures is not in the clinical condition itself-- osteoporosis --but rather the cognitition of the continued responsibility of being the focal point for all needed services for this patient.\"     Follow Up   Return in about 6 months (around 4/29/2025), or if symptoms worsen or fail to improve, for Recheck.  Patient was given instructions and counseling regarding her condition or for health maintenance advice. Please see specific information pulled into the AVS if appropriate.             "

## 2024-12-09 RX ORDER — LOSARTAN POTASSIUM 100 MG/1
100 TABLET ORAL DAILY
Qty: 90 TABLET | Refills: 0 | Status: SHIPPED | OUTPATIENT
Start: 2024-12-09

## 2024-12-12 DIAGNOSIS — E03.9 ACQUIRED HYPOTHYROIDISM: ICD-10-CM

## 2024-12-12 RX ORDER — LEVOTHYROXINE SODIUM 100 UG/1
100 TABLET ORAL DAILY
Qty: 90 TABLET | Refills: 0 | Status: SHIPPED | OUTPATIENT
Start: 2024-12-12

## 2025-03-12 DIAGNOSIS — E03.9 ACQUIRED HYPOTHYROIDISM: ICD-10-CM

## 2025-03-12 RX ORDER — LEVOTHYROXINE SODIUM 100 UG/1
100 TABLET ORAL DAILY
Qty: 90 TABLET | Refills: 0 | Status: SHIPPED | OUTPATIENT
Start: 2025-03-12

## 2025-03-12 RX ORDER — LOSARTAN POTASSIUM 100 MG/1
100 TABLET ORAL DAILY
Qty: 90 TABLET | Refills: 0 | Status: SHIPPED | OUTPATIENT
Start: 2025-03-12

## 2025-04-28 ENCOUNTER — LAB (OUTPATIENT)
Dept: LAB | Facility: HOSPITAL | Age: OVER 89
End: 2025-04-28
Payer: MEDICARE

## 2025-04-28 ENCOUNTER — OFFICE VISIT (OUTPATIENT)
Dept: INTERNAL MEDICINE | Facility: CLINIC | Age: OVER 89
End: 2025-04-28
Payer: MEDICARE

## 2025-04-28 VITALS
OXYGEN SATURATION: 98 % | WEIGHT: 155 LBS | DIASTOLIC BLOOD PRESSURE: 70 MMHG | HEIGHT: 62 IN | HEART RATE: 74 BPM | SYSTOLIC BLOOD PRESSURE: 132 MMHG | RESPIRATION RATE: 16 BRPM | BODY MASS INDEX: 28.52 KG/M2 | TEMPERATURE: 97.8 F

## 2025-04-28 DIAGNOSIS — E13.59 OTHER SPECIFIED DIABETES MELLITUS WITH OTHER CIRCULATORY COMPLICATION, WITHOUT LONG-TERM CURRENT USE OF INSULIN: ICD-10-CM

## 2025-04-28 DIAGNOSIS — E03.9 ACQUIRED HYPOTHYROIDISM: ICD-10-CM

## 2025-04-28 DIAGNOSIS — I10 ESSENTIAL HYPERTENSION: ICD-10-CM

## 2025-04-28 DIAGNOSIS — H61.23 BILATERAL IMPACTED CERUMEN: ICD-10-CM

## 2025-04-28 DIAGNOSIS — E78.2 MIXED HYPERLIPIDEMIA: Primary | ICD-10-CM

## 2025-04-28 LAB
ALBUMIN SERPL-MCNC: 3.7 G/DL (ref 3.5–5.2)
ALBUMIN/GLOB SERPL: 1.4 G/DL
ALP SERPL-CCNC: 72 U/L (ref 39–117)
ALT SERPL W P-5'-P-CCNC: 5 U/L (ref 1–33)
ANION GAP SERPL CALCULATED.3IONS-SCNC: 9 MMOL/L (ref 5–15)
AST SERPL-CCNC: 16 U/L (ref 1–32)
BILIRUB SERPL-MCNC: 0.3 MG/DL (ref 0–1.2)
BUN SERPL-MCNC: 6 MG/DL (ref 8–23)
BUN/CREAT SERPL: 8.8 (ref 7–25)
CALCIUM SPEC-SCNC: 9.1 MG/DL (ref 8.2–9.6)
CHLORIDE SERPL-SCNC: 96 MMOL/L (ref 98–107)
CHOLEST SERPL-MCNC: 160 MG/DL (ref 0–200)
CO2 SERPL-SCNC: 25 MMOL/L (ref 22–29)
CREAT SERPL-MCNC: 0.68 MG/DL (ref 0.57–1)
EGFRCR SERPLBLD CKD-EPI 2021: 81.8 ML/MIN/1.73
GLOBULIN UR ELPH-MCNC: 2.7 GM/DL
GLUCOSE SERPL-MCNC: 105 MG/DL (ref 65–99)
HBA1C MFR BLD: 6.3 % (ref 4.8–5.6)
HDLC SERPL-MCNC: 47 MG/DL (ref 40–60)
LDLC SERPL CALC-MCNC: 93 MG/DL (ref 0–100)
LDLC/HDLC SERPL: 1.94 {RATIO}
POTASSIUM SERPL-SCNC: 4.2 MMOL/L (ref 3.5–5.2)
PROT SERPL-MCNC: 6.4 G/DL (ref 6–8.5)
SODIUM SERPL-SCNC: 130 MMOL/L (ref 136–145)
TRIGL SERPL-MCNC: 110 MG/DL (ref 0–150)
TSH SERPL DL<=0.05 MIU/L-ACNC: 0.7 UIU/ML (ref 0.27–4.2)
VLDLC SERPL-MCNC: 20 MG/DL (ref 5–40)

## 2025-04-28 PROCEDURE — 80053 COMPREHEN METABOLIC PANEL: CPT | Performed by: FAMILY MEDICINE

## 2025-04-28 PROCEDURE — 80061 LIPID PANEL: CPT | Performed by: FAMILY MEDICINE

## 2025-04-28 PROCEDURE — 36415 COLL VENOUS BLD VENIPUNCTURE: CPT | Performed by: FAMILY MEDICINE

## 2025-04-28 PROCEDURE — 83036 HEMOGLOBIN GLYCOSYLATED A1C: CPT | Performed by: FAMILY MEDICINE

## 2025-04-28 PROCEDURE — 84443 ASSAY THYROID STIM HORMONE: CPT | Performed by: FAMILY MEDICINE

## 2025-04-28 NOTE — PROGRESS NOTES
Subjective   The ABCs of the Annual Wellness Visit  Medicare Wellness Visit      Marcelle Johnson is a 89 y.o. patient who presents for a Medicare Wellness Visit.    The following portions of the patient's history were reviewed and   updated as appropriate: allergies, current medications, past family history, past medical history, past social history, past surgical history, and problem list.    Compared to one year ago, the patient's physical   health is the same.  Compared to one year ago, the patient's mental   health is the same.    Recent Hospitalizations:  She was not admitted to the hospital during the last year.     Current Medical Providers:  Patient Care Team:  Jorge Luis Weinberg MD as PCP - General (Family Medicine)  Maria M Galicia MD as Consulting Physician (Hematology and Oncology)  James Lee MD as Surgeon (Breast Surgery)  James Lee MD as Referring Physician (Breast Surgery)    Outpatient Medications Prior to Visit   Medication Sig Dispense Refill    acetaminophen (TYLENOL) 500 MG tablet Take 1 tablet by mouth Every 6 (Six) Hours As Needed for Mild Pain. 30 tablet 0    albuterol sulfate  (90 Base) MCG/ACT inhaler Inhale 2 puffs Every 4 (Four) Hours As Needed for Wheezing.      amLODIPine (NORVASC) 2.5 MG tablet Take 1 tablet by mouth Daily. 90 tablet 3    amoxicillin (AMOXIL) 875 MG tablet Take 1 tablet by mouth 2 (Two) Times a Day. 20 tablet 0    Ascorbic Acid 500 MG chewable tablet Chew 500 mg.      azithromycin (ZITHROMAX) 250 MG tablet Take 1 tablet by mouth Daily.      budesonide-formoterol (SYMBICORT) 160-4.5 MCG/ACT inhaler Inhale 2 puffs 2 (Two) Times a Day. 6 g 0    Calcium Citrate-Vitamin D (CALCIUM + D PO) Take 1,000 mg by mouth daily.      cholecalciferol (VITAMIN D3) 25 MCG (1000 UT) tablet Take 1 tablet by mouth Daily.      esomeprazole (nexIUM) 40 MG capsule TAKE 1 CAP BY MOUTH EVERY MORNING BEFORE BREAKFAST. CAN OPEN CAPS AND PLACE IN APPLESAUCE IF NEEDED.       FIBER PO Take 2 capsules by mouth Daily As Needed.      levothyroxine (SYNTHROID, LEVOTHROID) 100 MCG tablet TAKE 1 TABLET BY MOUTH EVERY DAY 90 tablet 0    losartan (COZAAR) 100 MG tablet TAKE 1 TABLET BY MOUTH EVERY DAY 90 tablet 0    metFORMIN ER (GLUCOPHAGE-XR) 500 MG 24 hr tablet TAKE 1 TABLET BY MOUTH EVERY DAY WITH BREAKFAST 90 tablet 1    predniSONE (DELTASONE) 20 MG tablet Take 2 tablets by mouth Daily.      promethazine-dextromethorphan (PROMETHAZINE-DM) 6.25-15 MG/5ML syrup Take 5 mL by mouth 4 (Four) Times a Day As Needed for Cough. 118 mL 0    sucralfate (CARAFATE) 1 g tablet       vitamin C (ASCORBIC ACID) 500 MG tablet Take 1 tablet by mouth Daily As Needed.       No facility-administered medications prior to visit.     No opioid medication identified on active medication list. I have reviewed chart for other potential  high risk medication/s and harmful drug interactions in the elderly.      Aspirin is not on active medication list.  Aspirin use is not indicated based on review of current medical condition/s. Risk of harm outweighs potential benefits.  .    Patient Active Problem List   Diagnosis    Abnormal brain scan    Abnormal finding on thyroid function test    Arthritis    Diabetes mellitus    Dysphagia    Mixed hyperlipidemia    Essential hypertension    Hypothyroidism    Osteoporosis    Borderline diabetes    Breast cancer of upper-outer quadrant of right female breast    Medicare annual wellness visit, initial    Hyponatremia    Other osteoporosis without current pathological fracture    Esophageal stricture    Adenomatous polyp of colon    Malignant neoplasm of colon    Screening mammogram, encounter for    Acute cystitis with hematuria    Urge incontinence of urine    Medicare annual wellness visit, subsequent    Pigmented skin lesion of uncertain behavior of neck    Acute vaginitis    Dyspnea, unspecified     Advance Care Planning Advance Directive is on file.  ACP discussion was held  "with the patient during this visit. Patient has an advance directive in EMR which is still valid.             Objective   Vitals:    25 0728   BP: 132/70   Pulse: 74   Resp: 16   Temp: 97.8 °F (36.6 °C)   TempSrc: Oral   SpO2: 98%   Weight: 70.3 kg (155 lb)   Height: 157.5 cm (62.01\")       Estimated body mass index is 28.34 kg/m² as calculated from the following:    Height as of this encounter: 157.5 cm (62.01\").    Weight as of this encounter: 70.3 kg (155 lb).    BMI is >= 25 and <30. (Overweight) The following options were offered after discussion;: weight loss educational material (shared in after visit summary)           Does the patient have evidence of cognitive impairment? No                                                                                               Health  Risk Assessment    Smoking Status:  Social History     Tobacco Use   Smoking Status Former    Current packs/day: 0.00    Average packs/day: 1 pack/day for 5.0 years (5.0 ttl pk-yrs)    Types: Cigarettes    Start date:     Quit date:     Years since quittin.3    Passive exposure: Never   Smokeless Tobacco Never     Alcohol Consumption:  Social History     Substance and Sexual Activity   Alcohol Use No       Fall Risk Screen  STEADI Fall Risk Assessment was completed, and patient is at LOW risk for falls.Assessment completed on:2025    Depression Screening   Little interest or pleasure in doing things? Not at all   Feeling down, depressed, or hopeless? Not at all   PHQ-2 Total Score 0      Health Habits and Functional and Cognitive Screenin/9/2024     8:26 AM   Functional & Cognitive Status   Do you have difficulty preparing food and eating? No    Do you have difficulty bathing yourself, getting dressed or grooming yourself? No    Do you have difficulty using the toilet? No    Do you have difficulty moving around from place to place? No    Do you have trouble with steps or getting out of a bed or a chair? " No   Current Diet Well Balanced Diet   Dental Exam Up to date   Eye Exam Up to date   Exercise (times per week) 0 times per week   Current Exercises Include No Regular Exercise   Do you need help using the phone?  No   Are you deaf or do you have serious difficulty hearing?  No   Do you need help to go to places out of walking distance? No   Do you need help shopping? No   Do you need help preparing meals?  No   Do you need help with housework?  No   Do you need help with laundry? No   Do you need help taking your medications? No   Do you need help managing money? No   Do you ever drive or ride in a car without wearing a seat belt? No   Have you felt unusual stress, anger or loneliness in the last month? No   Who do you live with? Alone   If you need help, do you have trouble finding someone available to you? No   Have you been bothered in the last four weeks by sexual problems? No   Do you have difficulty concentrating, remembering or making decisions? No       Data saved with a previous flowsheet row definition           Age-appropriate Screening Schedule:  Refer to the list below for future screening recommendations based on patient's age, sex and/or medical conditions. Orders for these recommended tests are listed in the plan section. The patient has been provided with a written plan.    Health Maintenance List  Health Maintenance   Topic Date Due    RSV Vaccine - Adults (1 - 1-dose 75+ series) Never done    ZOSTER VACCINE (2 of 3) 02/26/2015    DIABETIC FOOT EXAM  08/23/2018    DIABETIC EYE EXAM  03/03/2022    DXA SCAN  02/23/2023    URINE MICROALBUMIN-CREATININE RATIO (uACR)  05/27/2023    ANNUAL WELLNESS VISIT  04/09/2025    HEMOGLOBIN A1C  04/29/2025    COVID-19 Vaccine (4 - 2024-25 season) 05/12/2025 (Originally 9/1/2024)    INFLUENZA VACCINE  07/01/2025    LIPID PANEL  10/29/2025    TDAP/TD VACCINES (2 - Td or Tdap) 08/24/2027    Pneumococcal Vaccine 50+  Discontinued    MAMMOGRAM  Discontinued                                                                                                                                                 CMS Preventative Services Quick Reference  Risk Factors Identified During Encounter  Immunizations Discussed/Encouraged: Shingrix and RSV (Respiratory Syncytial Virus)    The above risks/problems have been discussed with the patient.  Pertinent information has been shared with the patient in the After Visit Summary.  An After Visit Summary and PPPS were made available to the patient.    Follow Up:   Next Medicare Wellness visit to be scheduled in 1 year.     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

## 2025-04-28 NOTE — PROGRESS NOTES
"Chief Complaint  Hypertension, Hyperlipidemia, and Diabetes    Subjective        Marcelle Johnson presents to [facility]  History of Present Illness  Patient appointment for ongoing management of chronic problems of hypertension hyperlipidemia diabetes doing well she will check her blood pressure she does not check her blood sugars she has no rashes she still living alone and driving no unwanted side effects of medication some decreased hearing  Objective   Vital Signs:  /70   Pulse 74   Temp 97.8 °F (36.6 °C) (Oral)   Resp 16   Ht 157.5 cm (62.01\")   Wt 70.3 kg (155 lb)   SpO2 98%   BMI 28.34 kg/m²   Estimated body mass index is 28.34 kg/m² as calculated from the following:    Height as of this encounter: 157.5 cm (62.01\").    Weight as of this encounter: 70.3 kg (155 lb).          Physical Exam  Vitals and nursing note reviewed.   Constitutional:       Appearance: Normal appearance. She is not ill-appearing.   HENT:      Right Ear: There is impacted cerumen.      Left Ear: There is impacted cerumen.   Cardiovascular:      Rate and Rhythm: Normal rate and regular rhythm.      Pulses: Normal pulses.      Heart sounds: Normal heart sounds.   Pulmonary:      Effort: Pulmonary effort is normal.      Breath sounds: Normal breath sounds.   Musculoskeletal:      Right lower leg: No edema.      Left lower leg: No edema.   Neurological:      Mental Status: She is alert.   Psychiatric:         Mood and Affect: Mood normal.         Behavior: Behavior normal.         Thought Content: Thought content normal.         Judgment: Judgment normal.        Result Review :    Common labs          10/29/2024    08:01   Common Labs   Glucose 111    BUN 11    Creatinine 0.75    Sodium 133    Potassium 4.2    Chloride 99    Calcium 9.0    Albumin 3.6    Total Bilirubin 0.3    Alkaline Phosphatase 71    AST (SGOT) 14    ALT (SGPT) 8    Total Cholesterol 172    Triglycerides 84    HDL Cholesterol 58  " "  LDL Cholesterol  98    Hemoglobin A1C 6.10                Assessment and Plan   Diagnoses and all orders for this visit:    1. Mixed hyperlipidemia (Primary)  Assessment & Plan:              Orders:  -     Cancel: Lipid Panel  -     Lipid Panel    2. Essential hypertension  Assessment & Plan:             Orders:  -     Cancel: Comprehensive Metabolic Panel  -     Comprehensive Metabolic Panel    3. Acquired hypothyroidism  Assessment & Plan:           Orders:  -     Cancel: TSH  -     TSH    4. 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 Bilateral impacted cerumen    Commend OTC eardrops apply at bedtime 1 side flush up with bulb syringe warm water sitting in the morning  Follow-up results of testing for ongoing management of chronic problems   This patient has a PCP that is the continuing focal point for all health care services, and the patient sees this physician to be evaluated for cerumen impaction. The inherent complexity that this code () captures is not in the clinical condition itself-- cerumen impaction --but rather the cognitition of the continued responsibility of being the focal point for all needed services for this patient.\"     Follow Up   Return in about 6 months (around 10/28/2025), or if symptoms worsen or fail to improve, for Recheck.  Patient was given instructions and counseling regarding her condition or for health maintenance advice. Please see specific information pulled into the AVS if appropriate.             "

## 2025-04-30 ENCOUNTER — RESULTS FOLLOW-UP (OUTPATIENT)
Dept: INTERNAL MEDICINE | Facility: CLINIC | Age: OVER 89
End: 2025-04-30
Payer: MEDICARE

## 2025-05-02 ENCOUNTER — TELEPHONE (OUTPATIENT)
Dept: INTERNAL MEDICINE | Facility: CLINIC | Age: OVER 89
End: 2025-05-02

## 2025-06-13 DIAGNOSIS — E03.9 ACQUIRED HYPOTHYROIDISM: ICD-10-CM

## 2025-06-16 RX ORDER — LOSARTAN POTASSIUM 100 MG/1
100 TABLET ORAL DAILY
Qty: 90 TABLET | Refills: 0 | Status: SHIPPED | OUTPATIENT
Start: 2025-06-16

## 2025-06-16 RX ORDER — LEVOTHYROXINE SODIUM 100 UG/1
100 TABLET ORAL DAILY
Qty: 90 TABLET | Refills: 0 | Status: SHIPPED | OUTPATIENT
Start: 2025-06-16

## 2025-08-08 DIAGNOSIS — E03.9 ACQUIRED HYPOTHYROIDISM: ICD-10-CM

## 2025-08-11 RX ORDER — LEVOTHYROXINE SODIUM 100 UG/1
100 TABLET ORAL DAILY
Qty: 90 TABLET | Refills: 0 | Status: SHIPPED | OUTPATIENT
Start: 2025-08-11

## 2025-08-11 RX ORDER — LOSARTAN POTASSIUM 100 MG/1
100 TABLET ORAL DAILY
Qty: 90 TABLET | Refills: 0 | Status: SHIPPED | OUTPATIENT
Start: 2025-08-11

## (undated) DEVICE — TUBING, SUCTION, 1/4" X 10', STRAIGHT: Brand: MEDLINE

## (undated) DEVICE — ESOPHAGEAL BALLOON DILATATION CATHETER: Brand: CRE FIXED WIRE

## (undated) DEVICE — BITEBLOCK OMNI BLOC

## (undated) DEVICE — CANN NASL CO2 TRULINK W/O2 A/

## (undated) DEVICE — DEV INFL CRE STERIFLATE 60CC DISP

## (undated) DEVICE — FRCP BX RADJAW4 NDL 2.8 240CM LG OG BX40

## (undated) DEVICE — Device: Brand: DEFENDO AIR/WATER/SUCTION AND BIOPSY VALVE

## (undated) DEVICE — THE TORRENT IRRIGATION SCOPE CONNECTOR IS USED WITH THE TORRENT IRRIGATION TUBING TO PROVIDE IRRIGATION FLUIDS SUCH AS STERILE WATER DURING GASTROINTESTINAL ENDOSCOPIC PROCEDURES WHEN USED IN CONJUNCTION WITH AN IRRIGATION PUMP (OR ELECTROSURGICAL UNIT).: Brand: TORRENT